# Patient Record
Sex: FEMALE | Race: WHITE | NOT HISPANIC OR LATINO | Employment: OTHER | URBAN - METROPOLITAN AREA
[De-identification: names, ages, dates, MRNs, and addresses within clinical notes are randomized per-mention and may not be internally consistent; named-entity substitution may affect disease eponyms.]

---

## 2017-01-12 ENCOUNTER — TELEPHONE (OUTPATIENT)
Dept: SURGERY | Facility: CLINIC | Age: 72
End: 2017-01-12

## 2017-01-12 ENCOUNTER — TELEPHONE (OUTPATIENT)
Dept: INTERNAL MEDICINE | Facility: CLINIC | Age: 72
End: 2017-01-12

## 2017-01-12 ENCOUNTER — OFFICE VISIT (OUTPATIENT)
Dept: SURGERY | Facility: CLINIC | Age: 72
End: 2017-01-12
Payer: MEDICARE

## 2017-01-12 VITALS
SYSTOLIC BLOOD PRESSURE: 169 MMHG | DIASTOLIC BLOOD PRESSURE: 72 MMHG | TEMPERATURE: 98 F | HEART RATE: 81 BPM | WEIGHT: 163.63 LBS | HEIGHT: 62 IN | BODY MASS INDEX: 30.11 KG/M2

## 2017-01-12 DIAGNOSIS — D05.12 DCIS (DUCTAL CARCINOMA IN SITU), LEFT: Primary | ICD-10-CM

## 2017-01-12 PROCEDURE — 99024 POSTOP FOLLOW-UP VISIT: CPT | Mod: S$GLB,,, | Performed by: SURGERY

## 2017-01-12 PROCEDURE — 99999 PR PBB SHADOW E&M-EST. PATIENT-LVL III: CPT | Mod: PBBFAC,,, | Performed by: SURGERY

## 2017-01-12 NOTE — TELEPHONE ENCOUNTER
Pt called regarding swelling and tenderness to breast a surgical site, pt c/o fullness/heaviness to breast, denies fever or redness to breast, pt requesting appointment with MD today, appointment scheduled per pt request

## 2017-01-12 NOTE — TELEPHONE ENCOUNTER
Spoke with pt, informed her that Dr Merchant was out of the office and the requested labs can be ordered during her upcoming office visit.

## 2017-01-12 NOTE — PROGRESS NOTES
REFERRING PHYSICIAN:  No referring provider defined for this encounter.       Manuela Merchant MD      DIAGNOSIS:    This is a 71 y.o. 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     TREATMENT SUMMARY:  The patient is status post left partial mastectomy re-excision on 12/02/2016. Her initial left partial mastectomy on 10/21/2016.     Final pathology was negative.    INTERVAL HISTORY:   Winnie Ngo comes in for a post-op check.  She denies fever, chills, chest pain or shortness of breath.  Her pain is well controlled.  Complains of swelling in the left breast.  No pain, just heaviness.    MEDICATIONS:  Current Outpatient Prescriptions   Medication Sig Dispense Refill    albuterol 90 mcg/actuation inhaler Inhale 2 puffs into the lungs every 6 (six) hours as needed for Wheezing. May substitute any albuterol inhaler on her plan. 1 Inhaler 12    cholecalciferol, vitamin D3, (VITAMIN D3) 1,000 unit capsule Take 1,000 Units by mouth 2 (two) times daily.      fluticasone-salmeterol 250-50 mcg/dose (ADVAIR) 250-50 mcg/dose diskus inhaler Inhale 1 puff into the lungs 2 (two) times daily. 1 each 12    lorazepam (ATIVAN) 1 MG tablet TAKE 1 TABLET BY MOUTH EVERY EVENING AS NEEDED FOR PANIC ATTACKS 30 tablet 0    ondansetron (ZOFRAN) 4 MG tablet Take 1 tablet (4 mg total) by mouth every 8 (eight) hours as needed for Nausea. 40 tablet 0    pantoprazole (PROTONIX) 20 MG tablet TAKE 1 TABLET BY MOUTH ONCE DAILY 30 tablet 5    hydrocodone-acetaminophen 5-325mg (NORCO) 5-325 mg per tablet Take 1-2 tablets by mouth every 4 to 6 hours as needed. 45 tablet 0     No current facility-administered medications for this visit.        ALLERGIES:   Review of patient's allergies indicates:   Allergen Reactions    Adhesive      Other reaction(s): Rash    Alendronate      Other reaction(s): Stomach upset  Other reaction(s): Stomach upset    Aspirin      Other reaction(s): Wheezing    Azithromycin Other  (See Comments)    Macrodantin [nitrofurantoin macrocrystalline]     Montelukast      Other reaction(s): Diarrhea  Other reaction(s): Diarrhea    Moxifloxacin      Other reaction(s): Diarrhea  Other reaction(s): Diarrhea    Nitrofurantoin monohyd/m-cryst      Other reaction(s): Nausea  Other reaction(s): Nausea    Penicillins      Other reaction(s): Rash    Sulfa (sulfonamide antibiotics)      Other reaction(s): Swelling  Other reaction(s): Hives    Sulfur      Other reaction(s): Hives  Other reaction(s): Swelling       PHYSICAL EXAMINATION:   General:  This is a well appearing female with appropriate speech, affect and gait.     Breast:  Incision clean, dry, and intact;non-tender, no increased warmth to touch.  There is a large seroma in the breast superior/lateral.    PATHOLOGY:  12/2/2016: #1 LEFT BREAST, LEFT ANTERIOR SUPERIOR MARGIN: NO RESIDUAL DUCTAL CARCINOMA IN SITU IDENTIFIED., #2 LEFT BREAST, ADDITIONAL ANTERIOR MARGIN: NO RESIDUAL DUCTAL CARCINOMA IN SITU IDENTIFIED., #3 LEFT BREAST, ADDITIONAL SUPERIOR MARGIN: NO RESIDUAL DUCTAL CARCINOMA IN SITU IDENTIFIED.      10/21/2016:  LEFT BREAST LEFT BREAST (WIRE LOCALIZATION LUMPECTOMY):  -DUCTAL CARCINOMA IN SITU, see synoptic report below  DUCTAL CARCINOMA IN SITU SYNOPTIC REPORT  -Procedure: Excision with wire-guided localization  -No lymph node sampling  -Specimen laterality: Left  -Size (extent) of DCIS: 14 mm in this specimen  -Histologic type: Ductal carcinoma in situ  -Architectural patterns: Solid  -Nuclear grade: High nuclear grade (grade 3 out of 3)  -Necrosis: Present, central expansive comedonecrosis  -Associated with microcalcifications  -Biopsy clip is present  -Margins:  -SUPERIOR margin is POSITIVE for ductal carcinoma in situ  -Other distance to closest anterior margin is less than 1 mm  -Other margins are negative for DCIS  -Pathologic staging: pTis NX MX  -Hormone receptors (performed on core biopsy):    -ER: Positive   -ID:  Positive      IMPRESSION:   The patient has had an uneventful postoperative course.    PLAN:   1. Return in 6 months for a follow up office visit and breast exam.  2. The patient is advised in continued exam of the breast chest wall and to report to this office sooner should she note any areas of abnormality or concern.  3. Will aspirate with ultrasound today.    Fine Needle Aspiration Procedure Note    Pre-operative Diagnosis: left breast seroma    Post-operative Diagnosis: same    Location: left breast    Anesthesia: not needed    Procedure Details   The Procedure, risks and complications have been discussed in detail (including, but not limited to pain, infection, bleeding) with the patient, and the patient has signed consent to have the surgery completed.    The skin was sterilely prepped and draped over the affected area in the usual fashion.  Fine Needle Aspiration was performed with a 21 guage needle x 1 using ultrasound guidance.  Contents of Aspiration: 155 ml of serosanguinous fluid.  Size of mass after cyst aspiration: gone  EBL: none  Sterile dressing placed.  May place ice pack over affected area during the first 24 hours.    Condition:  Stable    Complications:  none.

## 2017-01-12 NOTE — TELEPHONE ENCOUNTER
----- Message from Arabella Diehl sent at 1/12/2017 10:55 AM CST -----  Contact: call -[phone]  Doctor appointment and lab have been scheduled.  Please link lab orders to the lab appointment.  Date of doctor appointment:    Physical or EP:    Date of lab appointment:  01/13/17  Comments: pt is having labs for annual done and she would like to have a liver and pancreatis function added to that lab work

## 2017-01-13 ENCOUNTER — LAB VISIT (OUTPATIENT)
Dept: LAB | Facility: HOSPITAL | Age: 72
End: 2017-01-13
Attending: INTERNAL MEDICINE
Payer: MEDICARE

## 2017-01-13 DIAGNOSIS — R73.09 ELEVATED GLUCOSE: ICD-10-CM

## 2017-01-13 DIAGNOSIS — E78.00 ELEVATED LDL CHOLESTEROL LEVEL: ICD-10-CM

## 2017-01-13 LAB
ALBUMIN SERPL BCP-MCNC: 3.7 G/DL
ALP SERPL-CCNC: 83 U/L
ALT SERPL W/O P-5'-P-CCNC: 19 U/L
ANION GAP SERPL CALC-SCNC: 10 MMOL/L
AST SERPL-CCNC: 20 U/L
BASOPHILS # BLD AUTO: 0.03 K/UL
BASOPHILS NFR BLD: 0.5 %
BILIRUB SERPL-MCNC: 0.7 MG/DL
BUN SERPL-MCNC: 10 MG/DL
CALCIUM SERPL-MCNC: 9.7 MG/DL
CHLORIDE SERPL-SCNC: 104 MMOL/L
CHOLEST/HDLC SERPL: 4.8 {RATIO}
CO2 SERPL-SCNC: 26 MMOL/L
CREAT SERPL-MCNC: 0.8 MG/DL
DIFFERENTIAL METHOD: ABNORMAL
EOSINOPHIL # BLD AUTO: 0.2 K/UL
EOSINOPHIL NFR BLD: 4.1 %
ERYTHROCYTE [DISTWIDTH] IN BLOOD BY AUTOMATED COUNT: 12.8 %
EST. GFR  (AFRICAN AMERICAN): >60 ML/MIN/1.73 M^2
EST. GFR  (NON AFRICAN AMERICAN): >60 ML/MIN/1.73 M^2
ESTIMATED AVG GLUCOSE: 128 MG/DL
GLUCOSE SERPL-MCNC: 132 MG/DL
HBA1C MFR BLD HPLC: 6.1 %
HCT VFR BLD AUTO: 40.1 %
HDL/CHOLESTEROL RATIO: 20.8 %
HDLC SERPL-MCNC: 264 MG/DL
HDLC SERPL-MCNC: 55 MG/DL
HGB BLD-MCNC: 13.5 G/DL
LDLC SERPL CALC-MCNC: 178.2 MG/DL
LYMPHOCYTES # BLD AUTO: 2 K/UL
LYMPHOCYTES NFR BLD: 33.8 %
MCH RBC QN AUTO: 32 PG
MCHC RBC AUTO-ENTMCNC: 33.7 %
MCV RBC AUTO: 95 FL
MONOCYTES # BLD AUTO: 0.5 K/UL
MONOCYTES NFR BLD: 8.2 %
NEUTROPHILS # BLD AUTO: 3.1 K/UL
NEUTROPHILS NFR BLD: 53.2 %
NONHDLC SERPL-MCNC: 209 MG/DL
PLATELET # BLD AUTO: 247 K/UL
PMV BLD AUTO: 11 FL
POTASSIUM SERPL-SCNC: 4 MMOL/L
PROT SERPL-MCNC: 7.2 G/DL
RBC # BLD AUTO: 4.22 M/UL
SODIUM SERPL-SCNC: 140 MMOL/L
TRIGL SERPL-MCNC: 154 MG/DL
WBC # BLD AUTO: 5.85 K/UL

## 2017-01-13 PROCEDURE — 85025 COMPLETE CBC W/AUTO DIFF WBC: CPT

## 2017-01-13 PROCEDURE — 80053 COMPREHEN METABOLIC PANEL: CPT

## 2017-01-13 PROCEDURE — 80061 LIPID PANEL: CPT

## 2017-01-13 PROCEDURE — 36415 COLL VENOUS BLD VENIPUNCTURE: CPT | Mod: PO

## 2017-01-13 PROCEDURE — 83036 HEMOGLOBIN GLYCOSYLATED A1C: CPT

## 2017-01-16 ENCOUNTER — TELEPHONE (OUTPATIENT)
Dept: ADMINISTRATIVE | Facility: OTHER | Age: 72
End: 2017-01-16

## 2017-01-17 ENCOUNTER — INITIAL CONSULT (OUTPATIENT)
Dept: RADIATION ONCOLOGY | Facility: CLINIC | Age: 72
End: 2017-01-17
Payer: MEDICARE

## 2017-01-17 VITALS
DIASTOLIC BLOOD PRESSURE: 67 MMHG | HEART RATE: 75 BPM | SYSTOLIC BLOOD PRESSURE: 150 MMHG | TEMPERATURE: 98 F | HEIGHT: 62 IN | WEIGHT: 164.81 LBS | RESPIRATION RATE: 20 BRPM | BODY MASS INDEX: 30.33 KG/M2

## 2017-01-17 DIAGNOSIS — D05.12 DCIS (DUCTAL CARCINOMA IN SITU) OF BREAST, LEFT: Primary | ICD-10-CM

## 2017-01-17 PROCEDURE — 1159F MED LIST DOCD IN RCRD: CPT | Mod: S$GLB,,, | Performed by: RADIOLOGY

## 2017-01-17 PROCEDURE — 99999 PR PBB SHADOW E&M-EST. PATIENT-LVL III: CPT | Mod: PBBFAC,,, | Performed by: RADIOLOGY

## 2017-01-17 PROCEDURE — 1157F ADVNC CARE PLAN IN RCRD: CPT | Mod: S$GLB,,, | Performed by: RADIOLOGY

## 2017-01-17 PROCEDURE — 1160F RVW MEDS BY RX/DR IN RCRD: CPT | Mod: S$GLB,,, | Performed by: RADIOLOGY

## 2017-01-17 PROCEDURE — 99203 OFFICE O/P NEW LOW 30 MIN: CPT | Mod: S$GLB,,, | Performed by: RADIOLOGY

## 2017-01-17 PROCEDURE — 1126F AMNT PAIN NOTED NONE PRSNT: CPT | Mod: S$GLB,,, | Performed by: RADIOLOGY

## 2017-01-17 NOTE — PROGRESS NOTES
HISTORY OF PRESENT ILLNESS:   This patient presents for discussion of possible adjuvant radiotherapy for a recently diagnosed DCIS of the Lt. breast.     Mrs. Ngo' history dates back to 2016 when diagnostic MMG confirmed indeterminate calcifications with segmental distribution in the posterior central region of the Lt. breast.  Core biopsies revealed high grade DCIS with associated microcalcificatiions.  100% of the tumor cells were strongly ER and NM positive.  The patient underwent wire localization lumpectomy on 10/21/16.  Pathology revealed a 1.4 cm focus of solid high grade DCIS.  There was central expansive comedonecrosis.  The superior margin was positive and the anterior margin was close at < 1 mm.  She returned for re-excision of the cavity on 16.  Pathology revealed no residual DCIS.  She is now referred for consideration of radiotherapy.  Today, the patient states she feels well.  Mild breast soreness.       REVIEW OF SYSTEMS:   Review of Systems   Constitutional: Negative for chills, diaphoresis and fever.   HENT: Negative for congestion and sore throat.    Respiratory: Negative for cough and sputum production.    Cardiovascular: Negative for chest pain, palpitations and orthopnea.   Gastrointestinal: Negative for abdominal pain, nausea and vomiting.   Neurological: Negative for weakness.       GYN HISTORY:  Age of menarche was 12. Age of menopause (surgical) was 31. Last menstrual period was age 31. Patient admits to hormonal therapy (4 years of estrogen only). Patient is . Patient did not breast feed.    PAST MEDICAL HISTORY:  Past Medical History   Diagnosis Date    Allergy     Anxiety     Asthma     Benign neoplasm of colon     Cancer      colon    Cataract     COPD (chronic obstructive pulmonary disease)     Fever blister     GERD (gastroesophageal reflux disease)     Hyperlipidemia     Joint pain     Osteopenia     Personal history of malignant neoplasm of  large intestine        PAST SURGICAL HISTORY:  Past Surgical History   Procedure Laterality Date    Colon surgery       stage T1 N0 M0    Hysterectomy      Total abdominal hysterectomy w/ bilateral salpingoophorectomy      Upper endoscopy w/ banding  2004     Schatzki's ring dialated 2 sessile polyps in stomach    Appendectomy      Cholecystectomy      Sinus surgery      Breast surgery       lump removal    Knee surgery       arthroscopic    Nasal septum surgery       with removal of polyps    Sigmoid colectomy         ALLERGIES:   Review of patient's allergies indicates:   Allergen Reactions    Adhesive      Other reaction(s): Rash    Alendronate      Other reaction(s): Stomach upset  Other reaction(s): Stomach upset    Aspirin      Other reaction(s): Wheezing    Azithromycin Other (See Comments)    Macrodantin [nitrofurantoin macrocrystalline]     Montelukast      Other reaction(s): Diarrhea  Other reaction(s): Diarrhea    Moxifloxacin      Other reaction(s): Diarrhea  Other reaction(s): Diarrhea    Nitrofurantoin monohyd/m-cryst      Other reaction(s): Nausea  Other reaction(s): Nausea    Penicillins      Other reaction(s): Rash    Sulfa (sulfonamide antibiotics)      Other reaction(s): Swelling  Other reaction(s): Hives    Sulfur      Other reaction(s): Hives  Other reaction(s): Swelling       MEDICATIONS:  Current Outpatient Prescriptions   Medication Sig    albuterol 90 mcg/actuation inhaler Inhale 2 puffs into the lungs every 6 (six) hours as needed for Wheezing. May substitute any albuterol inhaler on her plan.    cholecalciferol, vitamin D3, (VITAMIN D3) 1,000 unit capsule Take 1,000 Units by mouth 2 (two) times daily.    fluticasone-salmeterol 250-50 mcg/dose (ADVAIR) 250-50 mcg/dose diskus inhaler Inhale 1 puff into the lungs 2 (two) times daily.    lorazepam (ATIVAN) 1 MG tablet TAKE 1 TABLET BY MOUTH EVERY EVENING AS NEEDED FOR PANIC ATTACKS    pantoprazole (PROTONIX) 20 MG  tablet TAKE 1 TABLET BY MOUTH ONCE DAILY    hydrocodone-acetaminophen 5-325mg (NORCO) 5-325 mg per tablet Take 1-2 tablets by mouth every 4 to 6 hours as needed.    ondansetron (ZOFRAN) 4 MG tablet Take 1 tablet (4 mg total) by mouth every 8 (eight) hours as needed for Nausea.     No current facility-administered medications for this visit.        SOCIAL HISTORY:  Social History     Social History    Marital status:      Spouse name: N/A    Number of children: N/A    Years of education: N/A     Occupational History    Not on file.     Social History Main Topics    Smoking status: Never Smoker    Smokeless tobacco: Never Used    Alcohol use No    Drug use: No    Sexual activity: No     Other Topics Concern    Are You Pregnant Or Think You May Be? No    Breast-Feeding No     Social History Narrative       FAMILY HISTORY:  Family History   Problem Relation Age of Onset    Alzheimer's disease Mother     Kidney disease Mother      kidney stone    Heart disease Father 41     MI    Stroke Father     Cancer Brother      leg    Cancer Brother      Kidney cancer    Melanoma Neg Hx          PHYSICAL EXAMINATION:  Vitals:    17 0901   BP: (!) 150/67   Pulse: 75   Resp: 20   Temp: 97.9 °F (36.6 °C)     Physical Exam   Constitutional: She is well-developed, well-nourished, and in no distress.   Neck: Normal range of motion. Neck supple.   Pulmonary/Chest: Left breast exhibits inverted nipple. Left breast exhibits no mass, no nipple discharge, no skin change and no tenderness. Breasts are symmetrical.       Lymphadenopathy:     She has no cervical adenopathy.     She has no axillary adenopathy.        Right: No supraclavicular adenopathy present.        Left: No supraclavicular adenopathy present.       ASSESSMENT/PLAN:  Stage 0 (Tis, N0, M0) DCIS of the Lt. breast.     ECO    We discussed the rational for consideration of adjuvant radiotherapy in this setting.  Explained that radiotherapy  would be used to help decrease her risk of a local recurrence.  The patient understands her overall prognosis is very good given this is only DCIS and not invasive cancer.  We discussed the procedures, risks and benefits of radiotherapy.  Explained the acute and long term side effects of therapy including pulmonary and cardiac effects.   If she proceeds with therapy, we would recommend 42.5 Gy in 16 fractions.  Explained that based on the [place] criteria she would score a 1 for size, 1 - 2 for margins, 3 for grade and 1 for age.  Overall she is borderline for treatment at a score of 6 - 7.  The patient would like to consider her options further.  Explained that either radiation or observation is reasonable.  Also if she decides to take hormonal therapy then observation would be quite reasonable.   She was given my card with instructions to contact our office if she wants to proceed with treatment.     I spent approximately 60 minutes reviewing the available records and evaluating the patient, out of which over 50% of the time was spent face to face with the patient in counseling and coordinating this patient's care.

## 2017-01-19 ENCOUNTER — OFFICE VISIT (OUTPATIENT)
Dept: INTERNAL MEDICINE | Facility: CLINIC | Age: 72
End: 2017-01-19
Payer: MEDICARE

## 2017-01-19 VITALS
HEART RATE: 76 BPM | WEIGHT: 164.25 LBS | DIASTOLIC BLOOD PRESSURE: 72 MMHG | HEIGHT: 62 IN | BODY MASS INDEX: 30.22 KG/M2 | SYSTOLIC BLOOD PRESSURE: 138 MMHG

## 2017-01-19 DIAGNOSIS — Z85.038 HISTORY OF COLON CANCER: ICD-10-CM

## 2017-01-19 DIAGNOSIS — D05.12 DCIS (DUCTAL CARCINOMA IN SITU), LEFT: ICD-10-CM

## 2017-01-19 DIAGNOSIS — E11.9 CONTROLLED TYPE 2 DIABETES MELLITUS WITHOUT COMPLICATION, WITHOUT LONG-TERM CURRENT USE OF INSULIN: Primary | ICD-10-CM

## 2017-01-19 DIAGNOSIS — E78.00 PURE HYPERCHOLESTEROLEMIA: ICD-10-CM

## 2017-01-19 PROCEDURE — 1157F ADVNC CARE PLAN IN RCRD: CPT | Mod: S$GLB,,, | Performed by: INTERNAL MEDICINE

## 2017-01-19 PROCEDURE — 99499 UNLISTED E&M SERVICE: CPT | Mod: S$GLB,,, | Performed by: INTERNAL MEDICINE

## 2017-01-19 PROCEDURE — 1126F AMNT PAIN NOTED NONE PRSNT: CPT | Mod: S$GLB,,, | Performed by: INTERNAL MEDICINE

## 2017-01-19 PROCEDURE — 1159F MED LIST DOCD IN RCRD: CPT | Mod: S$GLB,,, | Performed by: INTERNAL MEDICINE

## 2017-01-19 PROCEDURE — 99214 OFFICE O/P EST MOD 30 MIN: CPT | Mod: S$GLB,,, | Performed by: INTERNAL MEDICINE

## 2017-01-19 PROCEDURE — 1160F RVW MEDS BY RX/DR IN RCRD: CPT | Mod: S$GLB,,, | Performed by: INTERNAL MEDICINE

## 2017-01-19 PROCEDURE — 3044F HG A1C LEVEL LT 7.0%: CPT | Mod: S$GLB,,, | Performed by: INTERNAL MEDICINE

## 2017-01-19 PROCEDURE — 2022F DILAT RTA XM EVC RTNOPTHY: CPT | Mod: S$GLB,,, | Performed by: INTERNAL MEDICINE

## 2017-01-19 PROCEDURE — 99999 PR PBB SHADOW E&M-EST. PATIENT-LVL III: CPT | Mod: PBBFAC,,, | Performed by: INTERNAL MEDICINE

## 2017-01-19 PROCEDURE — 3060F POS MICROALBUMINURIA REV: CPT | Mod: S$GLB,,, | Performed by: INTERNAL MEDICINE

## 2017-01-19 RX ORDER — ATORVASTATIN CALCIUM 10 MG/1
10 TABLET, FILM COATED ORAL NIGHTLY
Qty: 90 TABLET | Refills: 3 | Status: SHIPPED | OUTPATIENT
Start: 2017-01-19 | End: 2018-02-19 | Stop reason: SDUPTHER

## 2017-01-19 NOTE — MR AVS SNAPSHOT
[facility]  [address]  [facility] [address]  Phone: [phone]  Fax: [phone]                  Winnie Ngo   2017 11:30 AM   Office Visit    Description:  Female : 1945   Provider:  Manuela Merchant MD   Department:  [facility]           Reason for Visit     Follow-up           Diagnoses this Visit        Comments    Controlled type 2 diabetes mellitus without complication, without long-term current use of insulin    -  Primary     Pure hypercholesterolemia         DCIS (ductal carcinoma in situ), left                To Do List           Future Appointments        Provider Department Dept Phone    2/15/2017 10:00 AM MD [facility] - Hematology Oncology [phone]    2017 10:00 AM [facility] [phone]      Goals (5 Years of Data)     None      Follow-Up and Disposition     Return in about 2 months (around 3/19/2017) for Follow up : morning appt.       These Medications        Disp Refills Start End    atorvastatin (LIPITOR) 10 MG tablet 90 tablet 3 2017    Take 1 tablet (10 mg total) by mouth every evening. - Oral    Pharmacy: [facility] - [address] Ph #: [phone]         [facility]     [facility] On Call Nurse Care Line -  Assistance  Registered nurses in the [facility] On Call Center provide clinical advisement, health education, appointment booking, and other advisory services.  Call for this free service at [phone].             Medications           Message regarding Medications     Verify the changes and/or additions to your medication regime listed below are the same as discussed with your clinician today.  If any of these changes or additions are incorrect, please notify your healthcare provider.        START taking these NEW medications        Refills    atorvastatin (LIPITOR) 10 MG tablet 3     "Sig: Take 1 tablet (10 mg total) by mouth every evening.    Class: Normal    Route: Oral           Verify that the below list of medications is an accurate representation of the medications you are currently taking.  If none reported, the list may be blank. If incorrect, please contact your healthcare provider. Carry this list with you in case of emergency.           Current Medications     albuterol 90 mcg/actuation inhaler Inhale 2 puffs into the lungs every 6 (six) hours as needed for Wheezing. May substitute any albuterol inhaler on her plan.    cholecalciferol, vitamin D3, (VITAMIN D3) 1,000 unit capsule Take 1,000 Units by mouth 2 (two) times daily.    fluticasone-salmeterol 250-50 mcg/dose (ADVAIR) 250-50 mcg/dose diskus inhaler Inhale 1 puff into the lungs 2 (two) times daily.    hydrocodone-acetaminophen 5-325mg (NORCO) 5-325 mg per tablet Take 1-2 tablets by mouth every 4 to 6 hours as needed.    lorazepam (ATIVAN) 1 MG tablet TAKE 1 TABLET BY MOUTH EVERY EVENING AS NEEDED FOR PANIC ATTACKS    ondansetron (ZOFRAN) 4 MG tablet Take 1 tablet (4 mg total) by mouth every 8 (eight) hours as needed for Nausea.    pantoprazole (PROTONIX) 20 MG tablet TAKE 1 TABLET BY MOUTH ONCE DAILY    atorvastatin (LIPITOR) 10 MG tablet Take 1 tablet (10 mg total) by mouth every evening.           Clinical Reference Information           Vital Signs - Last Recorded  Most recent update: 1/19/2017 11:17 AM by Makenna Castellon MA    BP Pulse Ht Wt BMI    138/72 76 5' 2" (1.575 m) 74.5 kg (164 lb 3.9 oz) 30.04 kg/m2      Blood Pressure          Most Recent Value    BP  138/72      Allergies as of 1/19/2017     Adhesive    Alendronate    Aspirin    Azithromycin    Macrodantin [Nitrofurantoin Macrocrystalline]    Montelukast    Moxifloxacin    Nitrofurantoin Monohyd/m-cryst    Penicillins    Sulfa (Sulfonamide Antibiotics)    Sulfur      Immunizations Administered on Date of Encounter - 1/19/2017     None      Orders Placed During " Today's Visit      Normal Orders This Visit    Ambulatory Referral to Diabetes Education

## 2017-01-19 NOTE — PROGRESS NOTES
Subjective:       Patient ID: Winnie Ngo is a 71 y.o. female.    Chief Complaint: Follow-up (Cholesterol)    HPI   Patient has recently been diagnosed with DCIS having surgery. She discussed with me her concerns with deciding on further treatment and will be meeting with Dr. Flores.    She has glucose and A1C consistent with diabetes. We discussed the importance of treating diabetes beginning with diabetes education, hypertension goals and hyperlipidemia treatment.    Review of Systems   Constitutional: Negative for chills, fever and unexpected weight change.   HENT: Negative for trouble swallowing.    Respiratory: Negative for cough, shortness of breath and wheezing.    Cardiovascular: Negative for chest pain and palpitations.   Gastrointestinal: Negative for abdominal distention, abdominal pain, blood in stool and vomiting.   Musculoskeletal: Negative for back pain.       Objective:      Physical Exam   Constitutional: She is oriented to person, place, and time. She appears well-developed and well-nourished. No distress.   Neck: Carotid bruit is not present. No thyromegaly present.   Cardiovascular: Normal rate, regular rhythm and normal heart sounds.  PMI is not displaced.    Pulmonary/Chest: Effort normal and breath sounds normal. No respiratory distress.   Abdominal: Soft. Bowel sounds are normal. She exhibits no distension. There is no tenderness.   Musculoskeletal: She exhibits no edema.   Neurological: She is alert and oriented to person, place, and time.       Assessment:       1. Controlled type 2 diabetes mellitus without complication, without long-term current use of insulin    2. Pure hypercholesterolemia        Plan:       Winnie was seen today for follow-up.    Diagnoses and all orders for this visit:    Controlled type 2 diabetes mellitus without complication, without long-term current use of insulin: will begin with diabetes education  -     Ambulatory Referral to Diabetes Education    Pure  hypercholesterolemia: will start statin    Blood pressure at goal    DCIS (ductal carcinoma in situ), left    Other orders  -     atorvastatin (LIPITOR) 10 MG tablet; Take 1 tablet (10 mg total) by mouth every evening.      Return in about 2 months (around 3/19/2017) for Follow up : morning appt.    New Prescriptions    ATORVASTATIN (LIPITOR) 10 MG TABLET    Take 1 tablet (10 mg total) by mouth every evening.       Modified Medications    No medications on file       Orders Placed This Encounter   Procedures    Ambulatory Referral to Diabetes Education     Referral Priority:   Routine     Referral Type:   Consultation     Referral Reason:   Specialty Services Required     Requested Specialty:   Endocrinology     Number of Visits Requested:   1       Labs, studies and consults associated with this visit were reviewed

## 2017-02-15 ENCOUNTER — INITIAL CONSULT (OUTPATIENT)
Dept: HEMATOLOGY/ONCOLOGY | Facility: CLINIC | Age: 72
End: 2017-02-15
Payer: MEDICARE

## 2017-02-15 VITALS
DIASTOLIC BLOOD PRESSURE: 70 MMHG | RESPIRATION RATE: 20 BRPM | BODY MASS INDEX: 30.34 KG/M2 | WEIGHT: 164.88 LBS | HEIGHT: 62 IN | OXYGEN SATURATION: 97 % | HEART RATE: 69 BPM | TEMPERATURE: 98 F | SYSTOLIC BLOOD PRESSURE: 150 MMHG

## 2017-02-15 DIAGNOSIS — M85.9 DISORDER OF BONE DENSITY AND STRUCTURE, UNSPECIFIED: ICD-10-CM

## 2017-02-15 DIAGNOSIS — M85.80 OSTEOPENIA: ICD-10-CM

## 2017-02-15 DIAGNOSIS — D05.12 DCIS (DUCTAL CARCINOMA IN SITU), LEFT: Primary | ICD-10-CM

## 2017-02-15 PROCEDURE — 1159F MED LIST DOCD IN RCRD: CPT | Mod: S$GLB,,, | Performed by: INTERNAL MEDICINE

## 2017-02-15 PROCEDURE — 99205 OFFICE O/P NEW HI 60 MIN: CPT | Mod: S$GLB,,, | Performed by: INTERNAL MEDICINE

## 2017-02-15 PROCEDURE — 1157F ADVNC CARE PLAN IN RCRD: CPT | Mod: S$GLB,,, | Performed by: INTERNAL MEDICINE

## 2017-02-15 PROCEDURE — 1160F RVW MEDS BY RX/DR IN RCRD: CPT | Mod: S$GLB,,, | Performed by: INTERNAL MEDICINE

## 2017-02-15 PROCEDURE — 1126F AMNT PAIN NOTED NONE PRSNT: CPT | Mod: S$GLB,,, | Performed by: INTERNAL MEDICINE

## 2017-02-15 PROCEDURE — 99999 PR PBB SHADOW E&M-EST. PATIENT-LVL IV: CPT | Mod: PBBFAC,,, | Performed by: INTERNAL MEDICINE

## 2017-02-15 NOTE — PROGRESS NOTES
Subjective:       Patient ID: Winnie Ngo is a 71 y.o. female.    Chief Complaint: Consult    HPI     This is a 70 yo female recently diagnosed with DCIS who presents to discuss prevention strategies and surveillance. She has already done some reading on endocrine therapy and feels like it is not something she wishes to pursue but open to discussion. Her concerns are side effects- for example she has osteopenia already and is trying to lose weight and worries how this would be affected.    - 9/9/16 Screening mammography:  Calcifications in the left breast noted  - 9/14/16 Diagnostic mammogram and Ultrasound:  Calcifications in the left breast are suspicious.  Histology using core biopsy  is recommended.  ACR BI-RADS Category 4: Suspicious Abnormality  - 9/21/16 stereotactic biopsy performed  Pathology revealed:  1. CORE BIOPSIES OF POSTERIOR CENTRAL LEFT BREAST:  HIGH-GRADE DUCTAL CARCINOMA IN SITU WITH ASSOCIATED MICROCALCIFICATIONS  HORMONE RECEPTOR STUDIES:  Essentially all of the cells of the carcinoma in situ are strongly both nuclear estrogen receptor and nuclear  progesterone receptor positive. The positive and negative controls stained appropriately.  2. CORE BIOPSIES OF POSTERIOR CENTRAL LEFT BREAST:  NO NEOPLASIA IDENTIFIED  - 10/21/16 Lumpectomy with Dr. Mendoza  -Specimen laterality: Left  -Size (extent) of DCIS: 14 mm in this specimen  -Histologic type: Ductal carcinoma in situ  -Architectural patterns: Solid  -Nuclear grade: High nuclear grade (grade 3 out of 3)  -Necrosis: Present, central expansive comedonecrosis  -Associated with microcalcifications  -Biopsy clip is present  -Margins:  -SUPERIOR margin is POSITIVE for ductal carcinoma in situ  -Other distance to closest anterior margin is less than 1 mm  -Other margins are negative for DCIS  -Pathologic staging: pTis NX MX  - required re-excision for close margins  Pathology negative for residual         GYN History:  Age of menarche was 12. Age  of menopause (surgical) was 31. Last menstrual period was age 31. Patient admits to hormonal therapy (4 years of estrogen only). Patient is . Patient did not breast feed.     FAMILY history:  No family hx of ovarian or breast cancer    PMH:  Osteopenia  Hyperlipidemia  GERD  Asthma  Borderline COPD  Arthritis- knee surgery  Chronic back issues  Hx of previous right lumpectomy () with benign findings and previous left core biopsy that was also benign.     Review of Systems   Constitutional: Negative for activity change, appetite change, chills, diaphoresis, fatigue, fever and unexpected weight change.   HENT: Negative for congestion, dental problem, ear pain, hearing loss, mouth sores, nosebleeds, rhinorrhea, tinnitus and trouble swallowing.    Eyes: Negative for redness and visual disturbance.   Respiratory: Negative for cough, chest tightness, shortness of breath and wheezing.    Cardiovascular: Negative for chest pain, palpitations and leg swelling.   Gastrointestinal: Negative for abdominal distention, abdominal pain, blood in stool, constipation, diarrhea, nausea and vomiting.   Genitourinary: Negative for decreased urine volume, difficulty urinating, dysuria, frequency and hematuria.   Musculoskeletal: Positive for arthralgias. Negative for back pain, gait problem, joint swelling and neck pain.   Skin: Negative for color change, pallor, rash and wound.   Neurological: Negative for dizziness, weakness, light-headedness, numbness and headaches.   Hematological: Negative for adenopathy. Does not bruise/bleed easily.   Psychiatric/Behavioral: Negative for confusion, dysphoric mood and sleep disturbance.       Objective:      Physical Exam   Constitutional: She is oriented to person, place, and time. She appears well-developed and well-nourished. No distress.   Presents alone   HENT:   Head: Normocephalic and atraumatic.   Right Ear: External ear normal.   Left Ear: External ear normal.   Nose: Nose normal.    Mouth/Throat: Oropharynx is clear and moist. No oropharyngeal exudate.   Eyes: Conjunctivae and EOM are normal. Pupils are equal, round, and reactive to light. Right eye exhibits no discharge. Left eye exhibits no discharge. No scleral icterus. Right pupil is round and reactive. Left pupil is round and reactive.   Neck: Normal range of motion. Neck supple. No tracheal deviation present. No thyromegaly present.   Cardiovascular: Normal rate, regular rhythm, normal heart sounds and intact distal pulses.  Exam reveals no gallop and no friction rub.    No murmur heard.  Pulmonary/Chest: Effort normal and breath sounds normal. No respiratory distress. She has no wheezes. She has no rales. She exhibits no tenderness.   Breasts - no masses, no lymphadenopathy   Abdominal: Soft. Bowel sounds are normal. She exhibits no distension and no mass. There is no hepatosplenomegaly. There is no tenderness. There is no rebound and no guarding.   No organomegaly   Musculoskeletal: Normal range of motion. She exhibits no edema or tenderness.   Lymphadenopathy:        Head (right side): No submandibular adenopathy present.        Head (left side): No submandibular adenopathy present.     She has no cervical adenopathy.        Right cervical: No superficial cervical, no deep cervical and no posterior cervical adenopathy present.       Left cervical: No superficial cervical, no deep cervical and no posterior cervical adenopathy present.        Right: No inguinal and no supraclavicular adenopathy present.        Left: No inguinal and no supraclavicular adenopathy present.   Neurological: She is alert and oriented to person, place, and time. She has normal strength and normal reflexes. No cranial nerve deficit or sensory deficit. She exhibits normal muscle tone. Coordination normal.   Skin: Skin is warm and dry. No bruising, no lesion, no petechiae and no rash noted. She is not diaphoretic. No erythema. No pallor.   Psychiatric: She has a  normal mood and affect. Her behavior is normal. Judgment and thought content normal. Her mood appears not anxious. She does not exhibit a depressed mood.   Nursing note and vitals reviewed.      Assessment:       1. DCIS (ductal carcinoma in situ), left        Plan:     1. Declines adjuvant endocrine therapy  Careful discussion regarding two classes- Tamoxifen and aromatase inhibitors and pros and cosn of each  She also declines adjuvant XRT  Will need annual mammograms  Requests nutritionist referral if insurance covers - otherwise literature to assist   Needs Vit D > 30 and discussed  Also eligible for [facility] referral      Distress Screening Results: Psychosocial Distress screening score of Distress Score: 5 noted and reviewed. No intervention indicated.

## 2017-02-15 NOTE — LETTER
February 15, 2017      Carmen Mendoza MD  [address] [facility] [address]           [facility]  [address]  [facility] [address]  Phone: [phone]          Patient: Winnie Ngo   MR Number: [id number]   YOB: 1945   Date of Visit: 2/15/2017       Dear Dr. Carmen Mendoza:    Thank you for referring Winnie Ngo to me for evaluation. Attached you will find relevant portions of my assessment and plan of care.    If you have questions, please do not hesitate to call me. I look forward to following Winnie Ngo along with you.    Sincerely,    Ileana Flores MD    Enclosure  CC:  No Recipients    If you would like to receive this communication electronically, please contact [email or web address] or [phone] to request more information on [facility] access.    For providers and/or their staff who would like to refer a patient to [facility], please contact us through our one-stop-shop provider referral line, [facility], at [phone].    If you feel you have received this communication in error or would no longer like to receive these types of communications, please e-mail [email or web address]

## 2017-02-17 ENCOUNTER — CLINICAL SUPPORT (OUTPATIENT)
Dept: DIABETES | Facility: CLINIC | Age: 72
End: 2017-02-17
Payer: MEDICARE

## 2017-02-17 DIAGNOSIS — E11.9 CONTROLLED TYPE 2 DIABETES MELLITUS WITHOUT COMPLICATION, WITHOUT LONG-TERM CURRENT USE OF INSULIN: ICD-10-CM

## 2017-02-17 DIAGNOSIS — E11.9 CONTROLLED TYPE 2 DIABETES MELLITUS WITHOUT COMPLICATION, WITHOUT LONG-TERM CURRENT USE OF INSULIN: Primary | ICD-10-CM

## 2017-02-17 PROCEDURE — G0108 DIAB MANAGE TRN  PER INDIV: HCPCS | Mod: S$GLB,,, | Performed by: INTERNAL MEDICINE

## 2017-02-17 PROCEDURE — 99999 PR PBB SHADOW E&M-EST. PATIENT-LVL I: CPT | Mod: PBBFAC,,,

## 2017-02-17 RX ORDER — LANCETS
1 EACH MISCELLANEOUS DAILY
Qty: 30 EACH | Refills: 11 | Status: SHIPPED | OUTPATIENT
Start: 2017-02-17 | End: 2023-01-27

## 2017-02-17 NOTE — PROGRESS NOTES
02/17/17 0000   Diabetes Education Visit   Diabetes Education Record Assessment/Progress Initial   Diabetes Type   Diabetes Type  Type II   Diabetes History   Diabetes Diagnosis 0-1 year   Nutrition   Meal Planning 3 meals per day;snacks between meal;water;eats out often  (HS snack; tea w/ sugar)   Monitoring    Monitoring (None)   Exercise    Exercise Type walking   Frequency 3-5 Times per week   Duration 30 min   Current Diabetes Treatment    Current Treatment (None)   Social History   Preferred Learning Method Face to Face;Demonstration;Hands On;Reading Materials   Primary Support Self   Smoking Status Never a Smoker   Alcohol Use Never   Barriers to Change   Barriers to Change None   Learning Challenges  None   Readiness to Learn    Readiness to Learn  Acceptance   Cultural Influences   Cultural Influences No   Diabetes Education Assessment/Progress   Acute Complications (preventing, detecting, and treating acute complications) 5;DC;IS;W   Chronic Complications (preventing, detecting, and treating chronic complications) 5;DC;IS;W   Diabetes Disease Process (diabetes disease process and treatment options) 5;DC;IS;W   Nutrition (Incorporating nutritional management into one's lifestyle) 5;DC;IS;W   Physical Activity (incorporating physical activity into one's lifestyle) 5;DC;IS;W   Medications (states correct name, dose, onset, peak, duration, side effects & timing of meds) N/A   Monitoring (monitoring blood glucose/other parameters & using results) 5;DC;IS;D;R;W   Goal Setting and Problem Solving (verbalizes behavior change strategies & sets realistic goals) 5;DC;IS   Behavior Change (developing personal strategies to health & behavior change) 5;DC;IS   Psychosocial Issues (developing personal srategies to address psychosocial concerns) 5;DC;IS   Goals   Monitoring Set  (Check BG 3-4 times a week)   Start Date 02/17/17   Diabetes Care Plan/Intervention   Education Plan/Intervention (Patient to call with  questions or concerns or if would like to schedule a follow up - did not wish to schedule a follow up at this time. Will see PCP and discuss in March)   Diabetes Meal Plan   Carbohydrate Per Meal 30-45g   Carbohydrate Per Snack  15-20g   Education Units of Time    Time Spent 45 min

## 2017-02-20 DIAGNOSIS — E11.9 TYPE 2 DIABETES MELLITUS WITHOUT COMPLICATION: ICD-10-CM

## 2017-03-16 DIAGNOSIS — F41.9 ANXIETY: ICD-10-CM

## 2017-03-16 RX ORDER — LORAZEPAM 1 MG/1
TABLET ORAL
Qty: 30 TABLET | Refills: 0 | Status: SHIPPED | OUTPATIENT
Start: 2017-03-16 | End: 2017-05-25 | Stop reason: SDUPTHER

## 2017-03-27 ENCOUNTER — OFFICE VISIT (OUTPATIENT)
Dept: INTERNAL MEDICINE | Facility: CLINIC | Age: 72
End: 2017-03-27
Payer: MEDICARE

## 2017-03-27 ENCOUNTER — LAB VISIT (OUTPATIENT)
Dept: LAB | Facility: HOSPITAL | Age: 72
End: 2017-03-27
Attending: INTERNAL MEDICINE
Payer: MEDICARE

## 2017-03-27 VITALS
HEIGHT: 62 IN | WEIGHT: 160.94 LBS | DIASTOLIC BLOOD PRESSURE: 70 MMHG | BODY MASS INDEX: 29.62 KG/M2 | SYSTOLIC BLOOD PRESSURE: 130 MMHG | HEART RATE: 70 BPM

## 2017-03-27 DIAGNOSIS — Z23 IMMUNIZATION DUE: ICD-10-CM

## 2017-03-27 DIAGNOSIS — E11.9 CONTROLLED TYPE 2 DIABETES MELLITUS WITHOUT COMPLICATION, WITHOUT LONG-TERM CURRENT USE OF INSULIN: Primary | ICD-10-CM

## 2017-03-27 DIAGNOSIS — E78.00 PURE HYPERCHOLESTEROLEMIA: ICD-10-CM

## 2017-03-27 DIAGNOSIS — E55.9 MILD VITAMIN D DEFICIENCY: ICD-10-CM

## 2017-03-27 DIAGNOSIS — R20.2 PARESTHESIA: ICD-10-CM

## 2017-03-27 DIAGNOSIS — K21.9 GASTROESOPHAGEAL REFLUX DISEASE WITHOUT ESOPHAGITIS: ICD-10-CM

## 2017-03-27 DIAGNOSIS — J45.30 MILD PERSISTENT ASTHMA WITHOUT COMPLICATION: ICD-10-CM

## 2017-03-27 LAB
25(OH)D3+25(OH)D2 SERPL-MCNC: 31 NG/ML
ALBUMIN SERPL BCP-MCNC: 4 G/DL
ALP SERPL-CCNC: 74 U/L
ALT SERPL W/O P-5'-P-CCNC: 17 U/L
ANION GAP SERPL CALC-SCNC: 8 MMOL/L
AST SERPL-CCNC: 21 U/L
BILIRUB SERPL-MCNC: 1.2 MG/DL
BUN SERPL-MCNC: 15 MG/DL
CALCIUM SERPL-MCNC: 9.7 MG/DL
CHLORIDE SERPL-SCNC: 106 MMOL/L
CHOLEST/HDLC SERPL: 2.7 {RATIO}
CO2 SERPL-SCNC: 28 MMOL/L
CREAT SERPL-MCNC: 0.8 MG/DL
EST. GFR  (AFRICAN AMERICAN): >60 ML/MIN/1.73 M^2
EST. GFR  (NON AFRICAN AMERICAN): >60 ML/MIN/1.73 M^2
GLUCOSE SERPL-MCNC: 116 MG/DL
HDL/CHOLESTEROL RATIO: 36.7 %
HDLC SERPL-MCNC: 158 MG/DL
HDLC SERPL-MCNC: 58 MG/DL
LDLC SERPL CALC-MCNC: 81.8 MG/DL
NONHDLC SERPL-MCNC: 100 MG/DL
POTASSIUM SERPL-SCNC: 3.8 MMOL/L
PROT SERPL-MCNC: 7.3 G/DL
SODIUM SERPL-SCNC: 142 MMOL/L
TRIGL SERPL-MCNC: 91 MG/DL
VIT B12 SERPL-MCNC: 352 PG/ML

## 2017-03-27 PROCEDURE — 1160F RVW MEDS BY RX/DR IN RCRD: CPT | Mod: S$GLB,,, | Performed by: INTERNAL MEDICINE

## 2017-03-27 PROCEDURE — 1159F MED LIST DOCD IN RCRD: CPT | Mod: S$GLB,,, | Performed by: INTERNAL MEDICINE

## 2017-03-27 PROCEDURE — 83036 HEMOGLOBIN GLYCOSYLATED A1C: CPT

## 2017-03-27 PROCEDURE — 2022F DILAT RTA XM EVC RTNOPTHY: CPT | Mod: S$GLB,,, | Performed by: INTERNAL MEDICINE

## 2017-03-27 PROCEDURE — 3044F HG A1C LEVEL LT 7.0%: CPT | Mod: S$GLB,,, | Performed by: INTERNAL MEDICINE

## 2017-03-27 PROCEDURE — 1126F AMNT PAIN NOTED NONE PRSNT: CPT | Mod: S$GLB,,, | Performed by: INTERNAL MEDICINE

## 2017-03-27 PROCEDURE — 80061 LIPID PANEL: CPT

## 2017-03-27 PROCEDURE — 36415 COLL VENOUS BLD VENIPUNCTURE: CPT

## 2017-03-27 PROCEDURE — 82607 VITAMIN B-12: CPT

## 2017-03-27 PROCEDURE — 99214 OFFICE O/P EST MOD 30 MIN: CPT | Mod: S$GLB,,, | Performed by: INTERNAL MEDICINE

## 2017-03-27 PROCEDURE — 99999 PR PBB SHADOW E&M-EST. PATIENT-LVL III: CPT | Mod: PBBFAC,,, | Performed by: INTERNAL MEDICINE

## 2017-03-27 PROCEDURE — 1157F ADVNC CARE PLAN IN RCRD: CPT | Mod: S$GLB,,, | Performed by: INTERNAL MEDICINE

## 2017-03-27 PROCEDURE — 99499 UNLISTED E&M SERVICE: CPT | Mod: S$GLB,,, | Performed by: INTERNAL MEDICINE

## 2017-03-27 PROCEDURE — 82306 VITAMIN D 25 HYDROXY: CPT

## 2017-03-27 PROCEDURE — 80053 COMPREHEN METABOLIC PANEL: CPT

## 2017-03-27 PROCEDURE — 3060F POS MICROALBUMINURIA REV: CPT | Mod: S$GLB,,, | Performed by: INTERNAL MEDICINE

## 2017-03-27 RX ORDER — PANTOPRAZOLE SODIUM 20 MG/1
20 TABLET, DELAYED RELEASE ORAL DAILY
Qty: 90 TABLET | Refills: 3 | Status: SHIPPED | OUTPATIENT
Start: 2017-03-27 | End: 2018-03-21 | Stop reason: SDUPTHER

## 2017-03-27 RX ORDER — FLUTICASONE PROPIONATE AND SALMETEROL 250; 50 UG/1; UG/1
1 POWDER RESPIRATORY (INHALATION) 2 TIMES DAILY
Qty: 3 EACH | Refills: 3 | Status: SHIPPED | OUTPATIENT
Start: 2017-03-27 | End: 2017-11-08 | Stop reason: SDUPTHER

## 2017-03-27 NOTE — MR AVS SNAPSHOT
[facility]  [address]  [facility] [address]  Phone: [phone]  Fax: [phone]                  Winnie Ngo   3/27/2017 9:30 AM   Office Visit    Description:  Female : 1945   Provider:  Manuela Merchant MD   Department:  [facility]           Reason for Visit     Follow-up           Diagnoses this Visit        Comments    Controlled type 2 diabetes mellitus without complication, without long-term current use of insulin    -  Primary     Immunization due         Mild persistent asthma without complication         Pure hypercholesterolemia         Uncontrolled type 2 diabetes mellitus without complication, without long-term current use of insulin         Gastroesophageal reflux disease without esophagitis         Mild vitamin D deficiency         Paresthesia                To Do List           Future Appointments        Provider Department Dept Phone    2017 10:00 AM [facility] [phone]      Goals (5 Years of Data)     None      Follow-Up and Disposition     Return in about 3 months (around 2017) for Follow up in 3 months with A1C.       These Medications        Disp Refills Start End    fluticasone-salmeterol 250-50 mcg/dose (ADVAIR) 250-50 mcg/dose diskus inhaler 3 each 3 3/27/2017 3/27/2018    Inhale 1 puff into the lungs 2 (two) times daily. - Inhalation    Pharmacy: [facility] - [address] Ph #: [phone]       pantoprazole (PROTONIX) 20 MG tablet 90 tablet 3 3/27/2017     Take 1 tablet (20 mg total) by mouth once daily. - Oral    Pharmacy: [id number] - [address] [facility] [address] Ph #: [phone]         [facility] On Call     [facility] On Call Nurse Care Line -  Assistance  Registered nurses in the [facility] On Call Center provide clinical advisement, health education, appointment booking,  and other advisory services.  Call for this free service at [phone].             Medications           Message regarding Medications     Verify the changes and/or additions to your medication regime listed below are the same as discussed with your clinician today.  If any of these changes or additions are incorrect, please notify your healthcare provider.        CHANGE how you are taking these medications     Start Taking Instead of    pantoprazole (PROTONIX) 20 MG tablet pantoprazole (PROTONIX) 20 MG tablet    Dosage:  Take 1 tablet (20 mg total) by mouth once daily. Dosage:  TAKE 1 TABLET BY MOUTH ONCE DAILY    Reason for Change:  Reorder            Verify that the below list of medications is an accurate representation of the medications you are currently taking.  If none reported, the list may be blank. If incorrect, please contact your healthcare provider. Carry this list with you in case of emergency.           Current Medications     albuterol 90 mcg/actuation inhaler Inhale 2 puffs into the lungs every 6 (six) hours as needed for Wheezing. May substitute any albuterol inhaler on her plan.    atorvastatin (LIPITOR) 10 MG tablet Take 1 tablet (10 mg total) by mouth every evening.    blood sugar diagnostic (TRUE METRIX GLUCOSE TEST STRIP) Strp 1 each by Misc.(Non-Drug; Combo Route) route once daily.    cholecalciferol, vitamin D3, (VITAMIN D3) 1,000 unit capsule Take 4,000 Units by mouth once daily. Per Dr. Flores    fluticasone-salmeterol 250-50 mcg/dose (ADVAIR) 250-50 mcg/dose diskus inhaler Inhale 1 puff into the lungs 2 (two) times daily.    lancets Misc 1 each by Misc.(Non-Drug; Combo Route) route once daily.    lorazepam (ATIVAN) 1 MG tablet TAKE 1 TABLET BY MOUTH EVERY EVENING AS NEEDED FOR PANIC ATTACKS    pantoprazole (PROTONIX) 20 MG tablet Take 1 tablet (20 mg total) by mouth once daily.           Clinical Reference Information           Your Vitals Were     BP Pulse Height Weight BMI     "130/70 70 5' 2" (1.575 m) 73 kg (160 lb 15 oz) 29.44 kg/m2      Blood Pressure          Most Recent Value    BP  130/70      Allergies as of 3/27/2017     Adhesive    Alendronate    Aspirin    Azithromycin    Macrodantin [Nitrofurantoin Macrocrystalline]    Montelukast    Moxifloxacin    Nitrofurantoin Monohyd/m-cryst    Penicillins    Sulfa (Sulfonamide Antibiotics)    Sulfur      Immunizations Administered on Date of Encounter - 3/27/2017     None      Orders Placed During Today's Visit      Normal Orders This Visit    Ambulatory consult to Podiatry     Ambulatory referral to Optometry     Future Labs/Procedures Expected by Expires    Comprehensive metabolic panel  3/27/2017 (Approximate) 5/26/2017    Hemoglobin A1c  3/27/2017 (Approximate) 5/26/2017    Lipid panel  3/27/2017 (Approximate) 5/26/2017    Microalbumin/creatinine urine ratio  3/27/2017 (Approximate) 5/26/2017    Vitamin B12  3/27/2017 5/26/2018    Vitamin D  3/27/2017 (Approximate) 5/26/2017    Hemoglobin A1c  6/25/2017 (Approximate) 7/25/2017      Language Assistance Services     ATTENTION: Language assistance services are available, free of charge. Please call [phone].      ATENCIÓN: Si habla chen, tiene a kirby disposición servicios gratuitos de asistencia lingüística. Llame al [phone].     CHÚ Ý: N?u b?n nói Ti?ng Vi?t, có các d?ch v? h? tr? ngôn ng? mi?n phí dành cho b?n. G?i s? [phone].         [facility] complies with applicable Federal civil rights laws and does not discriminate on the basis of race, color, national origin, age, disability, or sex.        "

## 2017-03-27 NOTE — PROGRESS NOTES
"Subjective:       Patient ID: Winnie Ngo is a 72 y.o. female.    Chief Complaint: Follow-up (Diabetes)    HPI   The patient's last appointment was on January 19 of 2017.  At that time she was diagnosed with diabetes and referred to diabetes education.  She was started on a statin.  Her blood pressure goal was set to be less than 139/89 initially.  She was asked to follow-up with me in one month.      Diabetes type 2  medication compliance: not on medication diabetic diet compliance: patient has been instructed home glucose monitoring: at most once a day    Patient is tolerating the cholesterol medication    /70  Pulse 70  Ht 5' 2" (1.575 m)  Wt 73 kg (160 lb 15 oz)  BMI 29.44 kg/m2,   Hemoglobin A1C   Date Value Ref Range Status   01/13/2017 6.1 4.5 - 6.2 % Final     Comment:     According to ADA guidelines, hemoglobin A1C <7.0% represents  optimal control in non-pregnant diabetic patients.  Different  metrics may apply to specific populations.   Standards of Medical Care in Diabetes - 2016.  For the purpose of screening for the presence of diabetes:  <5.7%     Consistent with the absence of diabetes  5.7-6.4%  Consistent with increasing risk for diabetes   (prediabetes)  >or=6.5%  Consistent with diabetes  Currently no consensus exists for use of hemoglobin A1C  for diagnosis of diabetes for children.     06/22/2016 6.5 (H) 4.5 - 6.2 % Final   09/10/2015 6.3 (H) 4.5 - 6.2 % Final   ,   Creatinine   Date Value Ref Range Status   01/13/2017 0.8 0.5 - 1.4 mg/dL Final   10/17/2016 0.7 0.5 - 1.4 mg/dL Final   06/16/2016 0.8 0.5 - 1.4 mg/dL Final       Lab Results   Component Value Date    LDLCALC 178.2 (H) 01/13/2017    LDLCALC 167.2 (H) 06/16/2016    LDLCALC 159.0 11/05/2014               Review of Systems   Constitutional: Negative for chills, fever and unexpected weight change.   HENT: Negative for trouble swallowing.    Respiratory: Negative for cough, shortness of breath and wheezing.  "   Cardiovascular: Negative for chest pain and palpitations.   Gastrointestinal: Negative for abdominal distention, abdominal pain, blood in stool and vomiting.   Musculoskeletal: Negative for back pain.       Objective:      Physical Exam   Constitutional: She is oriented to person, place, and time. She appears well-developed and well-nourished. No distress.   Neck: Carotid bruit is not present. No thyromegaly present.   Cardiovascular: Normal rate, regular rhythm and normal heart sounds.  PMI is not displaced.    Pulmonary/Chest: Effort normal and breath sounds normal. No respiratory distress.   Abdominal: Soft. Bowel sounds are normal. She exhibits no distension. There is no tenderness.   Musculoskeletal: She exhibits no edema.   Neurological: She is alert and oriented to person, place, and time.       Assessment:       1. Controlled type 2 diabetes mellitus without complication, without long-term current use of insulin    2. Immunization due    3. Mild persistent asthma without complication    4. Pure hypercholesterolemia    5. Uncontrolled type 2 diabetes mellitus without complication, without long-term current use of insulin    6. Gastroesophageal reflux disease without esophagitis    7. Mild vitamin D deficiency    8. Paresthesia        Plan:       Procedures Ordered This Visit      Ambulatory consult to Podiatry         Ambulatory referral to Optometry         Comprehensive metabolic panel         Hemoglobin A1c         Lipid panel         Microalbumin/creatinine urine ratio         Vitamin B12         Vitamin D         Winnie was seen today for follow-up.    Diagnoses and all orders for this visit:    Controlled type 2 diabetes mellitus without complication, without long-term current use of insulin: new onset    Immunization due: Tdap will check pharmacy    Mild persistent asthma without complication: stable will meds sent to long term pharmacy  -     fluticasone-salmeterol 250-50 mcg/dose (ADVAIR) 250-50  mcg/dose diskus inhaler; Inhale 1 puff into the lungs 2 (two) times daily.    Pure hypercholesterolemia: new statin use will check lab  -     Lipid panel; Future  -     Comprehensive metabolic panel; Future    Uncontrolled type 2 diabetes mellitus without complication, without long-term current use of insulin  -     Ambulatory referral to Optometry  -     Ambulatory consult to Podiatry  -     Microalbumin/creatinine urine ratio; Future  -     Hemoglobin A1c; Future    Gastroesophageal reflux disease without esophagitis  -     pantoprazole (PROTONIX) 20 MG tablet; Take 1 tablet (20 mg total) by mouth once daily.    Mild vitamin D deficiency: vit D has recently been changed to 4000 daily in [facility] for cancer prior it was 2000  -     Vitamin D; Future    Paresthesia: possibly related to diabetes or PPI  -     Vitamin B12; Future      Return in about 3 months (around 6/27/2017) for Follow up in 3 months with A1C.    New Prescriptions    No medications on file       Modified Medications    Modified Medication Previous Medication    FLUTICASONE-SALMETEROL 250-50 MCG/DOSE (ADVAIR) 250-50 MCG/DOSE DISKUS INHALER fluticasone-salmeterol 250-50 mcg/dose (ADVAIR) 250-50 mcg/dose diskus inhaler       Inhale 1 puff into the lungs 2 (two) times daily.    Inhale 1 puff into the lungs 2 (two) times daily.    PANTOPRAZOLE (PROTONIX) 20 MG TABLET pantoprazole (PROTONIX) 20 MG tablet       Take 1 tablet (20 mg total) by mouth once daily.    TAKE 1 TABLET BY MOUTH ONCE DAILY       Orders Placed This Encounter   Procedures    Microalbumin/creatinine urine ratio     Standing Status:   Future     Standing Expiration Date:   5/26/2017     Order Specific Question:   Specimen Source     Answer:   Urine    Lipid panel     Standing Status:   Future     Standing Expiration Date:   5/26/2017    Comprehensive metabolic panel     Standing Status:   Future     Standing Expiration Date:   5/26/2017    Vitamin B12     Standing Status:   Future      Standing Expiration Date:   5/26/2018    Vitamin D     Standing Status:   Future     Standing Expiration Date:   5/26/2017    Hemoglobin A1c     Standing Status:   Future     Standing Expiration Date:   5/26/2017    Hemoglobin A1c     Standing Status:   Future     Standing Expiration Date:   7/25/2017    Ambulatory referral to Optometry     Referral Priority:   Routine     Referral Type:   Vision (Optometry)     Referral Reason:   Specialty Services Required     Requested Specialty:   Optometry     Number of Visits Requested:   1    Ambulatory consult to Podiatry     Referral Priority:   Routine     Referral Type:   Consultation     Referral Reason:   Specialty Services Required     Requested Specialty:   Podiatry     Number of Visits Requested:   1       Labs, studies and consults associated with this visit were reviewed

## 2017-03-28 LAB
ESTIMATED AVG GLUCOSE: 131 MG/DL
HBA1C MFR BLD HPLC: 6.2 %

## 2017-03-29 ENCOUNTER — TELEPHONE (OUTPATIENT)
Dept: OPTOMETRY | Facility: CLINIC | Age: 72
End: 2017-03-29

## 2017-04-03 ENCOUNTER — TELEPHONE (OUTPATIENT)
Dept: ENDOSCOPY | Facility: HOSPITAL | Age: 72
End: 2017-04-03

## 2017-04-03 NOTE — TELEPHONE ENCOUNTER
Pt stated that she will call back to schedule colonoscopy with Dr. Feliciano after her clinic appointment, she would like to talk to him before scheduling her colonoscopy. Pt verified number [phone]. Pt was transferred to  to make a clinic appointment with Dr. Feliciano.

## 2017-05-25 DIAGNOSIS — F41.9 ANXIETY: ICD-10-CM

## 2017-05-25 RX ORDER — LORAZEPAM 1 MG/1
TABLET ORAL
Qty: 30 TABLET | Refills: 0 | Status: SHIPPED | OUTPATIENT
Start: 2017-05-25 | End: 2017-05-31 | Stop reason: SDUPTHER

## 2017-05-25 NOTE — TELEPHONE ENCOUNTER
Please see message and advise.   Patient requesting refill, and something for mucus in her throat. Refuse appointment until further notice.     Thank you

## 2017-05-25 NOTE — TELEPHONE ENCOUNTER
----- Message from Teresa Beach sent at 5/25/2017 12:03 PM CDT -----  Contact: Self/[phone] cell   Yellow and green phlem from her throat pt is asking if she can get a rx for doxycycline (VIBRA-TABS) 100 MG tablet to be sent to the pharmacy pt also stated that she needs a refill on her lorazepam (ATIVAN) 1 MG tablet pt would like for the rx's to be sent to [facility] [phone]. Please call and advise            Thank you

## 2017-05-30 ENCOUNTER — TELEPHONE (OUTPATIENT)
Dept: INTERNAL MEDICINE | Facility: CLINIC | Age: 72
End: 2017-05-30

## 2017-05-30 DIAGNOSIS — F41.9 ANXIETY: ICD-10-CM

## 2017-05-30 NOTE — TELEPHONE ENCOUNTER
----- Message from Codi Butler sent at 5/30/2017  2:18 PM CDT -----  Contact: patient, phone [phone]  The patient needs the Ativan cancelled and sent to [facility] in [place].      Please send to:    [facility] [address] [place] [address] - [facility] [address] [facility] AT [facility] [phone] (phone) [phone] (fax)\    Please call the patient and let her know this was done.     Thanks!

## 2017-05-31 RX ORDER — LORAZEPAM 1 MG/1
TABLET ORAL
Qty: 30 TABLET | Refills: 0 | Status: SHIPPED | OUTPATIENT
Start: 2017-05-31 | End: 2017-08-25 | Stop reason: SDUPTHER

## 2017-05-31 NOTE — TELEPHONE ENCOUNTER
----- Message from Ángel Mancia MA sent at 5/31/2017 11:17 AM CDT -----  Contact: Self/[phone]  Please advise patient is calling to check the status of medication lorazepam (ATIVAN) 1 MG tablet. Please thanks

## 2017-05-31 NOTE — TELEPHONE ENCOUNTER
Please cancel any lorazepam at the [facility] here and I have ordered the med to her pharmacy in [place]. GML

## 2017-06-08 ENCOUNTER — TELEPHONE (OUTPATIENT)
Dept: OPHTHALMOLOGY | Facility: CLINIC | Age: 72
End: 2017-06-08

## 2017-06-19 ENCOUNTER — TELEPHONE (OUTPATIENT)
Dept: OPTOMETRY | Facility: CLINIC | Age: 72
End: 2017-06-19

## 2017-08-25 DIAGNOSIS — F41.9 ANXIETY: ICD-10-CM

## 2017-08-25 RX ORDER — LORAZEPAM 1 MG/1
TABLET ORAL
Qty: 30 TABLET | Refills: 0 | Status: SHIPPED | OUTPATIENT
Start: 2017-08-25 | End: 2017-11-08 | Stop reason: SDUPTHER

## 2017-08-25 NOTE — TELEPHONE ENCOUNTER
----- Message from Love Gage sent at 8/24/2017  2:08 PM CDT -----  Contact: Pt at [phone]   RX request - refill or new RX.  Is this a refill or new RX:  refill  RX name and strength: lorazepam (ATIVAN) 1 MG tablet  Directions:   Is this a 30 day or 90 day RX:    Pharmacy name and phone #:[facility] [address] [place] [address] - [address] [place] [address] AT [facility] & [employer]   [phone] (Phone)  [phone] (Fax)      Comments:

## 2017-10-16 DIAGNOSIS — E11.9 TYPE 2 DIABETES MELLITUS WITHOUT COMPLICATION: ICD-10-CM

## 2017-11-08 DIAGNOSIS — F41.9 ANXIETY: ICD-10-CM

## 2017-11-08 DIAGNOSIS — J45.30 MILD PERSISTENT ASTHMA WITHOUT COMPLICATION: ICD-10-CM

## 2017-11-08 NOTE — TELEPHONE ENCOUNTER
----- Message from Riley Santiago sent at 11/8/2017  1:44 PM CST -----  Contact: Patient [phone]  RX request - refill or new RX.  Is this a refill or new Rx: Refill  RX name and strength:   fluticasone-salmeterol 250-50 mcg/dose (ADVAIR) 250-50 mcg/dose diskus inhaler 3 each  lorazepam (ATIVAN) 1 MG tablet 30 tablet     Is this a 30 day or 90 day RX:  30 day supply    Pharmacy name and phone # [id number] - [place], [place] - [address] [facility] AT [facility] [employer] [phone] (Phone)  [phone] (fax)    Please note to send this to [facility] in [place], she is out of town.    Thank you

## 2017-11-09 RX ORDER — LORAZEPAM 1 MG/1
TABLET ORAL
Qty: 30 TABLET | Refills: 0 | Status: SHIPPED | OUTPATIENT
Start: 2017-11-09 | End: 2018-01-16 | Stop reason: SDUPTHER

## 2017-11-09 RX ORDER — FLUTICASONE PROPIONATE AND SALMETEROL 250; 50 UG/1; UG/1
1 POWDER RESPIRATORY (INHALATION) 2 TIMES DAILY
Qty: 3 EACH | Refills: 3 | Status: SHIPPED | OUTPATIENT
Start: 2017-11-09 | End: 2018-12-03 | Stop reason: SDUPTHER

## 2018-01-11 ENCOUNTER — TELEPHONE (OUTPATIENT)
Dept: OPTOMETRY | Facility: CLINIC | Age: 73
End: 2018-01-11

## 2018-01-16 DIAGNOSIS — F41.9 ANXIETY: ICD-10-CM

## 2018-01-16 RX ORDER — LORAZEPAM 1 MG/1
TABLET ORAL
Qty: 30 TABLET | Refills: 0 | Status: SHIPPED | OUTPATIENT
Start: 2018-01-16 | End: 2018-03-12 | Stop reason: SDUPTHER

## 2018-01-25 ENCOUNTER — PES CALL (OUTPATIENT)
Dept: ADMINISTRATIVE | Facility: CLINIC | Age: 73
End: 2018-01-25

## 2018-02-19 RX ORDER — ATORVASTATIN CALCIUM 10 MG/1
10 TABLET, FILM COATED ORAL NIGHTLY
Qty: 90 TABLET | Refills: 3 | Status: SHIPPED | OUTPATIENT
Start: 2018-02-19 | End: 2019-03-27 | Stop reason: SDUPTHER

## 2018-02-19 NOTE — TELEPHONE ENCOUNTER
----- Message from Barb Parikh sent at 2/19/2018  2:05 PM CST -----  Contact: Self [phone]  Pt states she is out of town and needs a refill on her atorvastatin (LIPITOR) 10 MG  For a 3 month supply Sent to [facility] [phone],Please call

## 2018-03-06 DIAGNOSIS — E11.9 CONTROLLED TYPE 2 DIABETES MELLITUS WITHOUT COMPLICATION, WITHOUT LONG-TERM CURRENT USE OF INSULIN: ICD-10-CM

## 2018-03-06 RX ORDER — CALCIUM CITRATE/VITAMIN D3 200MG-6.25
TABLET ORAL
Qty: 50 STRIP | Refills: 0 | Status: SHIPPED | OUTPATIENT
Start: 2018-03-06 | End: 2023-01-27

## 2018-03-12 DIAGNOSIS — F41.9 ANXIETY: ICD-10-CM

## 2018-03-12 RX ORDER — LORAZEPAM 1 MG/1
TABLET ORAL
Qty: 30 TABLET | Refills: 0 | Status: SHIPPED | OUTPATIENT
Start: 2018-03-12 | End: 2018-05-11 | Stop reason: SDUPTHER

## 2018-03-21 DIAGNOSIS — K21.9 GASTROESOPHAGEAL REFLUX DISEASE WITHOUT ESOPHAGITIS: ICD-10-CM

## 2018-03-21 RX ORDER — PANTOPRAZOLE SODIUM 20 MG/1
TABLET, DELAYED RELEASE ORAL
Qty: 90 TABLET | Refills: 0 | Status: SHIPPED | OUTPATIENT
Start: 2018-03-21 | End: 2018-07-11 | Stop reason: SDUPTHER

## 2018-04-10 ENCOUNTER — PES CALL (OUTPATIENT)
Dept: ADMINISTRATIVE | Facility: CLINIC | Age: 73
End: 2018-04-10

## 2018-05-11 ENCOUNTER — TELEPHONE (OUTPATIENT)
Dept: INTERNAL MEDICINE | Facility: CLINIC | Age: 73
End: 2018-05-11

## 2018-05-11 DIAGNOSIS — F41.9 ANXIETY: ICD-10-CM

## 2018-05-11 RX ORDER — LORAZEPAM 1 MG/1
TABLET ORAL
Qty: 30 TABLET | Refills: 0 | Status: SHIPPED | OUTPATIENT
Start: 2018-05-11 | End: 2018-07-11 | Stop reason: SDUPTHER

## 2018-07-02 ENCOUNTER — PES CALL (OUTPATIENT)
Dept: ADMINISTRATIVE | Facility: CLINIC | Age: 73
End: 2018-07-02

## 2018-07-02 ENCOUNTER — TELEPHONE (OUTPATIENT)
Dept: ORTHOPEDICS | Facility: CLINIC | Age: 73
End: 2018-07-02

## 2018-07-02 NOTE — TELEPHONE ENCOUNTER
Tried contacting patient no voicemail set up.    ----- Message from Debora Reza sent at 7/2/2018 11:34 AM CDT -----  Contact: Pt   Pt says she need to come in sooner than 7/19 says she is in serious pain    Pt can be reached at  [phone] thanks

## 2018-07-11 ENCOUNTER — OFFICE VISIT (OUTPATIENT)
Dept: INTERNAL MEDICINE | Facility: CLINIC | Age: 73
End: 2018-07-11
Payer: MEDICARE

## 2018-07-11 ENCOUNTER — HOSPITAL ENCOUNTER (OUTPATIENT)
Dept: RADIOLOGY | Facility: HOSPITAL | Age: 73
Discharge: HOME | End: 2018-07-11
Attending: INTERNAL MEDICINE
Payer: MEDICARE

## 2018-07-11 VITALS
WEIGHT: 148.56 LBS | HEART RATE: 77 BPM | HEIGHT: 62 IN | OXYGEN SATURATION: 97 % | BODY MASS INDEX: 27.34 KG/M2 | DIASTOLIC BLOOD PRESSURE: 68 MMHG | SYSTOLIC BLOOD PRESSURE: 119 MMHG

## 2018-07-11 DIAGNOSIS — E53.8 B12 NUTRITIONAL DEFICIENCY: ICD-10-CM

## 2018-07-11 DIAGNOSIS — J45.30 MILD PERSISTENT ASTHMA WITHOUT COMPLICATION: ICD-10-CM

## 2018-07-11 DIAGNOSIS — M25.561 ACUTE PAIN OF RIGHT KNEE: ICD-10-CM

## 2018-07-11 DIAGNOSIS — E55.9 MILD VITAMIN D DEFICIENCY: ICD-10-CM

## 2018-07-11 DIAGNOSIS — F41.9 ANXIETY: ICD-10-CM

## 2018-07-11 DIAGNOSIS — M81.0 POSTMENOPAUSAL BONE LOSS: ICD-10-CM

## 2018-07-11 DIAGNOSIS — Z00.00 PHYSICAL EXAM: Primary | ICD-10-CM

## 2018-07-11 DIAGNOSIS — Z12.31 ENCOUNTER FOR SCREENING MAMMOGRAM FOR BREAST CANCER: ICD-10-CM

## 2018-07-11 DIAGNOSIS — K22.2 SCHATZKI'S RING: ICD-10-CM

## 2018-07-11 DIAGNOSIS — E78.00 PURE HYPERCHOLESTEROLEMIA: ICD-10-CM

## 2018-07-11 DIAGNOSIS — D05.12 DUCTAL CARCINOMA IN SITU (DCIS) OF LEFT BREAST: ICD-10-CM

## 2018-07-11 DIAGNOSIS — K21.9 GASTROESOPHAGEAL REFLUX DISEASE WITHOUT ESOPHAGITIS: ICD-10-CM

## 2018-07-11 DIAGNOSIS — E61.1 IRON DEFICIENCY: ICD-10-CM

## 2018-07-11 DIAGNOSIS — Z41.9 SURGERY, ELECTIVE: ICD-10-CM

## 2018-07-11 PROCEDURE — 73562 X-RAY EXAM OF KNEE 3: CPT | Mod: 26,RT,, | Performed by: RADIOLOGY

## 2018-07-11 PROCEDURE — 73560 X-RAY EXAM OF KNEE 1 OR 2: CPT | Mod: TC,59,LT

## 2018-07-11 PROCEDURE — 99499 UNLISTED E&M SERVICE: CPT | Mod: S$GLB,,, | Performed by: INTERNAL MEDICINE

## 2018-07-11 PROCEDURE — 99999 PR PBB SHADOW E&M-EST. PATIENT-LVL V: CPT | Mod: PBBFAC,,, | Performed by: INTERNAL MEDICINE

## 2018-07-11 PROCEDURE — 73562 X-RAY EXAM OF KNEE 3: CPT | Mod: TC,RT

## 2018-07-11 PROCEDURE — 73560 X-RAY EXAM OF KNEE 1 OR 2: CPT | Mod: 26,XS,LT, | Performed by: RADIOLOGY

## 2018-07-11 PROCEDURE — 3044F HG A1C LEVEL LT 7.0%: CPT | Mod: CPTII,S$GLB,, | Performed by: INTERNAL MEDICINE

## 2018-07-11 PROCEDURE — 99397 PER PM REEVAL EST PAT 65+ YR: CPT | Mod: S$GLB,,, | Performed by: INTERNAL MEDICINE

## 2018-07-11 RX ORDER — PANTOPRAZOLE SODIUM 20 MG/1
TABLET, DELAYED RELEASE ORAL
Qty: 90 TABLET | Refills: 3 | Status: SHIPPED | OUTPATIENT
Start: 2018-07-11 | End: 2019-02-04

## 2018-07-11 RX ORDER — LORAZEPAM 1 MG/1
TABLET ORAL
Qty: 30 TABLET | Refills: 1 | Status: SHIPPED | OUTPATIENT
Start: 2018-07-11 | End: 2018-10-22 | Stop reason: SDUPTHER

## 2018-07-11 NOTE — PATIENT INSTRUCTIONS
Outpatient Procedures Ordered This Visit     Ambulatory Referral to Breast Surgery         Ambulatory Referral to Diabetes Education         CBC auto differential         Comprehensive metabolic panel         Diabetic Eye Screening Photo         DXA Bone Density Spine And Hip         Hemoglobin A1c         Iron and TIBC         Lipid panel         Mammo Digital Screening Bilat with Tomosynthesis CAD         Microalbumin/creatinine urine ratio         Vitamin B12         Vitamin D         X-ray Knee Ortho Right

## 2018-07-11 NOTE — PROGRESS NOTES
Subjective:      Patient ID: Winnie Ngo is a 73 y.o. female.    Chief Complaint: Annual Exam    HPI:  HPI   Patient is here for her annual exam    Last surgery : Breast Surgery 10/21/2016 and 12/2/2016: patient was not able to follow up    Annual exam: 7/11/2018  Colonoscopy 2013 poor prep : colonoscopy: case request completed ( patient wishes to postpone this 7/11/2018) Dr. Figueroa  Endoscopy 6/28/12: gastric polyps schatzki ring   EUS   Mammogram : ordered  Gyn:complete hysterectomy and oophorectomy  Optho:Cataracts : patient will make appt  Flu: : due in the fall  Tetanus:?  Shingles:did not have chicken pox  Pneumovax: done 8/11/2008 second   Bone density     Consultants:  Dr. Chisholm       Patient Active Problem List   Diagnosis    History of asthma    Hyperlipidemia: without diabetes ASCVD Risk 15.5%   with diabetes 27.9% ( elevated glucoses)    GERD (gastroesophageal reflux disease)    Osteopenia    History of endoscopy    History of colonoscopy    Pulmonary nodules    History of mammogram    Annual physical exam    Kidney stones    Surgery, elective:Hysterectomy oophorectomy     Schatzki's ring    Atrophic vaginitis    History of colon cancer    Colon adenomas    Diarrhea    Nonspecific (abnormal) findings on radiological and other examination of abdominal area, including retroperitoneum    Mild vitamin D deficiency    DJD (degenerative joint disease) of lumbar spine, mild    Chronic LBP    Osteoarthritis of left knee, severe    Right lumbar radiculopathy    Trochanteric bursitis of right hip    Asthma, mild persistent    Elevated LDL cholesterol level    Elevated glucose: checking A1C 6/16/2016    Abnormal gastrointestinal PET scan    DCIS (ductal carcinoma in situ)    DCIS (ductal carcinoma in situ) of breast    Uncontrolled type 2 diabetes mellitus without complication, without long-term current use of insulin     Past Medical History:   Diagnosis Date    Allergy      "Anxiety     Asthma     Benign neoplasm of colon     Cancer     colon    Cataract     COPD (chronic obstructive pulmonary disease)     Fever blister     GERD (gastroesophageal reflux disease)     Hyperlipidemia     Joint pain     Osteopenia     Personal history of malignant neoplasm of large intestine      Past Surgical History:   Procedure Laterality Date    APPENDECTOMY      BREAST LUMPECTOMY Left 2016    BREAST SURGERY Right     lump removal    CHOLECYSTECTOMY      COLON SURGERY      stage T1 N0 M0    HYSTERECTOMY      KNEE SURGERY      arthroscopic    NASAL SEPTUM SURGERY      with removal of polyps    sigmoid colectomy      SINUS SURGERY      TOTAL ABDOMINAL HYSTERECTOMY W/ BILATERAL SALPINGOOPHORECTOMY      UPPER ENDOSCOPY W/ BANDING  2004    Schatzki's ring dialated 2 sessile polyps in stomach     Family History   Problem Relation Age of Onset    Alzheimer's disease Mother     Kidney disease Mother         kidney stone    Heart disease Father 41        MI    Stroke Father     Cancer Brother         leg    Cancer Brother         Kidney cancer    Cancer Maternal Uncle     Melanoma Neg Hx      Review of Systems   Constitutional: Negative for chills, fever and unexpected weight change.   HENT: Negative for trouble swallowing.    Respiratory: Negative for cough, shortness of breath and wheezing.    Cardiovascular: Negative for chest pain and palpitations.   Gastrointestinal: Negative for abdominal distention, abdominal pain, blood in stool and vomiting.   Musculoskeletal: Negative for back pain.     Objective:     Vitals:    07/11/18 1032   BP: 119/68   Pulse: 77   SpO2: 97%   Weight: 67.4 kg (148 lb 9.4 oz)   Height: 5' 2" (1.575 m)   PainSc:   7   PainLoc: Knee     Body mass index is 27.18 kg/m².  Physical Exam   Constitutional: She is oriented to person, place, and time. She appears well-developed and well-nourished. No distress.   Neck: Carotid bruit is not present. No thyromegaly " present.   Cardiovascular: Normal rate, regular rhythm and normal heart sounds.  PMI is not displaced.    Pulmonary/Chest: Effort normal and breath sounds normal. No respiratory distress.   Abdominal: Soft. Bowel sounds are normal. She exhibits no distension. There is no tenderness.   Musculoskeletal: She exhibits no edema.   Neurological: She is alert and oriented to person, place, and time.      Protective Sensation (w/ 10 gram monofilament):  Right: Intact  Left: Intact    Visual Inspection:  Normal -  Right     Pedal Pulses:   Right: Present  Left: Present    Posterior tibialis:   Right:Present  Left: Present      Assessment:     1. Physical exam    2. Uncontrolled type 2 diabetes mellitus without complication, without long-term current use of insulin    3. Surgery, elective:Hysterectomy oophorectomy     4. Schatzki's ring    5. Mild vitamin D deficiency    6. Pure hypercholesterolemia    7. Ductal carcinoma in situ (DCIS) of left breast    8. Mild persistent asthma without complication    9. Acute pain of right knee    10. Encounter for screening mammogram for breast cancer    11. B12 nutritional deficiency    12. Iron deficiency    13. Postmenopausal bone loss    14. Gastroesophageal reflux disease without esophagitis    15. Anxiety      Plan:   Winnie was seen today for annual exam.    Diagnoses and all orders for this visit:    Physical exam: updated and reviewed    Uncontrolled type 2 diabetes mellitus without complication, without long-term current use of insulin: check A1C  -     CBC auto differential; Future  -     Comprehensive metabolic panel; Future  -     Lipid panel; Future  -     Hemoglobin A1c; Future  -     Microalbumin/creatinine urine ratio; Future  -     Diabetic Eye Screening Photo; Future  -     Ambulatory Referral to Diabetes Education    Surgery, elective:Hysterectomy oophorectomy :    Schatzki's ring: stable    Mild vitamin D deficiency: check lab  -     Vitamin D; Future    Pure  hypercholesterolemia: check lab    Ductal carcinoma in situ (DCIS) of left breast  -     Ambulatory Referral to Breast Surgery  -     Mammo Digital Screening Bilat with Tomosynthesis CAD; Future    Mild persistent asthma without complication    Acute pain of right knee  -     X-ray Knee Ortho Right; Future    Encounter for screening mammogram for breast cancer  -     Mammo Digital Screening Bilat with Tomosynthesis CAD; Future    B12 nutritional deficiency  -     Vitamin B12; Future    Iron deficiency  -     Iron and TIBC; Future    Postmenopausal bone loss  -     DXA Bone Density Spine And Hip; Future    Gastroesophageal reflux disease without esophagitis  -     pantoprazole (PROTONIX) 20 MG tablet; TAKE 1 TABLET(20 MG) BY MOUTH EVERY DAY    Anxiety  -     LORazepam (ATIVAN) 1 MG tablet; TAKE 1 TABLET BY MOUTH EVERY EVENING AS NEEDED FOR PANIC ATTACKS      Follow-up in about 6 months (around 1/11/2019) for Follow up diabetes.     Medication List          Accurate as of 7/11/18 11:59 PM. If you have any questions, ask your nurse or doctor.               CHANGE how you take these medications    pantoprazole 20 MG tablet  Commonly known as:  PROTONIX  TAKE 1 TABLET(20 MG) BY MOUTH EVERY DAY  What changed:  See the new instructions.  Changed by:  Manuela Merchant MD        CONTINUE taking these medications    albuterol 90 mcg/actuation inhaler  Inhale 2 puffs into the lungs every 6 (six) hours as needed for Wheezing. May substitute any albuterol inhaler on her plan.     atorvastatin 10 MG tablet  Commonly known as:  LIPITOR  Take 1 tablet (10 mg total) by mouth every evening.     fluticasone-salmeterol 250-50 mcg/dose 250-50 mcg/dose diskus inhaler  Commonly known as:  ADVAIR  Inhale 1 puff into the lungs 2 (two) times daily.     lancets Misc  1 each by Misc.(Non-Drug; Combo Route) route once daily.     LORazepam 1 MG tablet  Commonly known as:  ATIVAN  TAKE 1 TABLET BY MOUTH EVERY EVENING AS NEEDED FOR PANIC ATTACKS      TRUE METRIX GLUCOSE TEST STRIP Strp  Generic drug:  blood sugar diagnostic  USE TO MEASURE BLOOD SUGAR DAILY     VITAMIN D3 1,000 unit capsule  Generic drug:  cholecalciferol (vitamin D3)           Where to Get Your Medications      These medications were sent to [id number] - [place] [address] AT [facility] [address], [place] [address]    Phone:  [phone]   · pantoprazole 20 MG tablet     You can get these medications from any pharmacy    Bring a paper prescription for each of these medications  · LORazepam 1 MG tablet

## 2018-07-19 ENCOUNTER — OFFICE VISIT (OUTPATIENT)
Dept: ORTHOPEDICS | Facility: CLINIC | Age: 73
End: 2018-07-19
Payer: MEDICARE

## 2018-07-19 VITALS
DIASTOLIC BLOOD PRESSURE: 64 MMHG | WEIGHT: 148 LBS | HEIGHT: 62 IN | BODY MASS INDEX: 27.23 KG/M2 | SYSTOLIC BLOOD PRESSURE: 131 MMHG

## 2018-07-19 DIAGNOSIS — M17.11 PRIMARY OSTEOARTHRITIS OF RIGHT KNEE: Primary | ICD-10-CM

## 2018-07-19 PROCEDURE — 99202 OFFICE O/P NEW SF 15 MIN: CPT | Mod: S$GLB,,, | Performed by: ORTHOPAEDIC SURGERY

## 2018-07-19 PROCEDURE — 99999 PR PBB SHADOW E&M-EST. PATIENT-LVL III: CPT | Mod: PBBFAC,,, | Performed by: ORTHOPAEDIC SURGERY

## 2018-07-19 NOTE — PROGRESS NOTES
Subjective:      Patient ID: Winnie Ngo is a 73 y.o. female.    Chief Complaint: Pain of the Right Knee    HPI     They have experienced problems with their right knee over the past 1 month. The patient denies relevant history of injury/aggravation.  She did climb a lot of stairs which may have played a role.  She has a history of arthroscopy many years ago.  Pain is located diffusely Associated symptoms include swelling.  Symptoms are aggravated by prolonged sitting. They have been treated with NA.   Symptoms have recently stayed the same. Ambulation reportedly has not been impaired. Self care ADLs are not painful.     Review of Systems   Constitution: Negative for fever and weight loss.   HENT: Negative for congestion.    Eyes: Negative for visual disturbance.   Cardiovascular: Negative for chest pain.   Respiratory: Negative for shortness of breath.    Hematologic/Lymphatic: Negative for bleeding problem. Does not bruise/bleed easily.   Skin: Negative for poor wound healing.   Musculoskeletal: Positive for joint pain.   Gastrointestinal: Negative for abdominal pain.   Genitourinary: Negative for dysuria.   Neurological: Negative for seizures.   Psychiatric/Behavioral: Negative for altered mental status.   Allergic/Immunologic: Negative for persistent infections.         Objective:            Ortho/SPM Exam    Right knee-    [unfilled]    The patient is not in acute distress.   Body habitus is normal.   The patient walks with a limp.  Resisted SLR negative.   The skin over the knee is intact.  Knee effusion 1+  Tendernes is located:  Absent  Range of motion- Flexion 85 deg, Extension 10 deg,   Ligament exam:   MCL 1+ laxity   Lachman intact   Post sag intact    LCL intact  Patellar apprehension negative.  Popliteal cyst negative  Patellar crepitation present.  Flexion/pinch negative.  Pulses DP present, PT present.  Motor normal 5/5 strength in all tested muscle groups.   Sensory normal    I reviewed the  relevant radiographic images for the patient's condition:  Recent films show complete loss of medial joint space with osteophytes and varus deformity.            Assessment:       No diagnosis found.     objective findings are structurally advanced  Plan:       There are no diagnoses linked to this encounter.  I explained my diagnostic impression and the reasoning behind it in detail, using layman's terms.  Models and/or pictures were used to help in the explanation.    Natural history explained    Injection recommended and patient declined    Cane recommended    Sleeve requested and instructions given for this    Tylenol products recommended    I explained the potential role of surgery in the treatment of this condition to the patient.  They understand that if nonsurgical measures do not adequately control symptoms, surgery will be considered in the future.

## 2018-07-24 ENCOUNTER — CLINICAL SUPPORT (OUTPATIENT)
Dept: DIABETES | Facility: CLINIC | Age: 73
End: 2018-07-24
Payer: MEDICARE

## 2018-07-24 PROCEDURE — G0108 DIAB MANAGE TRN  PER INDIV: HCPCS | Mod: S$GLB,,, | Performed by: INTERNAL MEDICINE

## 2018-07-24 NOTE — PROGRESS NOTES
Diabetes Education  Author: Smita Davis RD, CDE  Date: 7/24/2018    Diabetes Education Visit  Diabetes Education Record Assessment/Progress: Initial    Diabetes Type  Diabetes Type : Type II    Lab Results   Component Value Date    HGBA1C 5.7 (H) 07/11/2018    HGBA1C 5.7 (H) 07/11/2018     Reviewed diabetes standards of care, current diet intake. SMBG 3x/wk. Doing wonderfully managing diabetes. Seen over a year ago by ESTELA Godwin for diabetes education.     Nutrition  Meal Planning: 3 meals per day    Monitoring   Self Monitoring : few times per week   Blood Glucose Logs: No  Do you use a personal glucose monitor?: No  Can you tell when your blood sugar is too high?: no    Current Diabetes Treatment   Current Treatment: Diet    Social History  Preferred Learning Method: Face to Face    Barriers to Change  Barriers to Change: None  Learning Challenges : None    Readiness to Learn   Readiness to Learn : Eager    Diabetes Education Assessment/Progress  Diabetes Disease Process (diabetes disease process and treatment options): Discussion, Individual Session, Written Materials Provided  Nutrition (Incorporating nutritional management into one's lifestyle): Discussion  Physical Activity (incorporating physical activity into one's lifestyle): Discussion, Individual Session  Medications (states correct name, dose, onset, peak, duration, side effects & timing of meds): Discussion, Individual Session  Monitoring (monitoring blood glucose/other parameters & using results): Discussion, Individual Session, Written Materials Provided  Acute Complications (preventing, detecting, and treating acute complications): Discussion  Chronic Complications (preventing, detecting, and treating chronic complications): Discussion  Clinical (diabetes, other pertinent medical history, and relevant comorbidities reviewed during visit): Discussion  Cognitive (knowledge of self-management skills, functional health literacy):  Discussion  Psychosocial (emotional response to diabetes): Discussion  Diabetes Distress and Support Systems: Discussion  Behavioral (readiness for change, lifestyle practices, self-care behaviors): Discussion    Goals  Patient has selected/evaluated goals during today's session: Yes, evaluated  Monitoring: % Met  Met Percentage : 100%    Diabetes Care Plan/Intervention  Education Plan/Intervention: Individual Follow-Up DSMT    Diabetes Meal Plan  Carbohydrate Per Meal: 30-45g  Carbohydrate Per Snack : 15-20g    Education Units of Time   Time Spent: 60 min    Health Maintenance was reviewed today with patient. Discussed with patient importance of routine eye exams, foot exams/foot care, blood work (i.e.: A1c, microalbumin, and lipid), dental visits, yearly flu vaccine, and pneumonia vaccine as indicated by PCP. Patient verbalized understanding.     Health Maintenance Topics with due status: Not Due       Topic Last Completion Date    Colonoscopy 03/25/2013    Influenza Vaccine 10/04/2016    Foot Exam 07/11/2018    Lipid Panel 07/11/2018    Hemoglobin A1c 07/11/2018    Urine Microalbumin 07/11/2018    Low Dose Statin 07/19/2018     Health Maintenance Due   Topic Date Due    TETANUS VACCINE  06/04/2008    Mammogram  09/14/2017    DEXA SCAN  11/10/2017    Eye Exam  03/27/2018

## 2018-08-08 ENCOUNTER — HOSPITAL ENCOUNTER (OUTPATIENT)
Dept: RADIOLOGY | Facility: CLINIC | Age: 73
Discharge: HOME | End: 2018-08-08
Attending: INTERNAL MEDICINE
Payer: MEDICARE

## 2018-08-08 DIAGNOSIS — M81.0 POSTMENOPAUSAL BONE LOSS: ICD-10-CM

## 2018-08-08 PROCEDURE — 77080 DXA BONE DENSITY AXIAL: CPT | Mod: TC

## 2018-08-08 PROCEDURE — 77080 DXA BONE DENSITY AXIAL: CPT | Mod: 26,,, | Performed by: INTERNAL MEDICINE

## 2018-09-24 ENCOUNTER — TELEPHONE (OUTPATIENT)
Dept: SURGERY | Facility: CLINIC | Age: 73
End: 2018-09-24

## 2018-09-24 NOTE — TELEPHONE ENCOUNTER
Returned call to patient, pt states she has been out of state and has recently returned, pt over due for follow up and mammogram, appts scheduled and confirmed per pt request, all questions answered at this time

## 2018-10-08 ENCOUNTER — HOSPITAL ENCOUNTER (OUTPATIENT)
Dept: RADIOLOGY | Facility: HOSPITAL | Age: 73
Discharge: HOME | End: 2018-10-08
Attending: INTERNAL MEDICINE
Payer: MEDICARE

## 2018-10-08 ENCOUNTER — OFFICE VISIT (OUTPATIENT)
Dept: SURGERY | Facility: CLINIC | Age: 73
End: 2018-10-08
Payer: MEDICARE

## 2018-10-08 VITALS — HEIGHT: 62 IN | BODY MASS INDEX: 27.22 KG/M2 | WEIGHT: 147.94 LBS

## 2018-10-08 DIAGNOSIS — Z85.3 PERSONAL HISTORY OF BREAST CANCER: Primary | ICD-10-CM

## 2018-10-08 DIAGNOSIS — D05.12 DUCTAL CARCINOMA IN SITU (DCIS) OF LEFT BREAST: ICD-10-CM

## 2018-10-08 DIAGNOSIS — Z12.31 ENCOUNTER FOR SCREENING MAMMOGRAM FOR BREAST CANCER: ICD-10-CM

## 2018-10-08 PROCEDURE — 99213 OFFICE O/P EST LOW 20 MIN: CPT | Mod: S$PBB,,, | Performed by: SURGERY

## 2018-10-08 PROCEDURE — 99999 PR PBB SHADOW E&M-EST. PATIENT-LVL III: CPT | Mod: PBBFAC,,, | Performed by: SURGERY

## 2018-10-08 PROCEDURE — 77062 BREAST TOMOSYNTHESIS BI: CPT | Mod: 26,,, | Performed by: RADIOLOGY

## 2018-10-08 PROCEDURE — 99213 OFFICE O/P EST LOW 20 MIN: CPT | Mod: PBBFAC | Performed by: SURGERY

## 2018-10-08 PROCEDURE — 77066 DX MAMMO INCL CAD BI: CPT | Mod: 26,,, | Performed by: RADIOLOGY

## 2018-10-08 PROCEDURE — 77066 DX MAMMO INCL CAD BI: CPT | Mod: TC,PO

## 2018-10-08 PROCEDURE — 1101F PT FALLS ASSESS-DOCD LE1/YR: CPT | Mod: CPTII,,, | Performed by: SURGERY

## 2018-10-08 NOTE — PROGRESS NOTES
DIAGNOSIS:    This is a 71 y.o. female with a stage 0 pTis Nx Mx grade 3 ER + [place] + DCIS of the left breast.      TREATMENT SUMMARY:  The patient is status post left partial mastectomy re-excision on 12/02/2016. Her initial left partial mastectomy on 10/21/2016. Final pathology was negative. On her last visit in January 2017, she had a seroma, which was drained in the office.    INTERVAL HISTORY:   Winnie Ngo comes in for follow-up about two years from her initial surgery. She had been out of state and has not been in the office since January 2017. She denies fever, chills, chest pain or shortness of breath. She has no pain complaint. She has lost about 45 lbs after being diagnosed with prediabetes. She lost the weight by cutting out sugar and walking.    MEDICATIONS:  Current Outpatient Medications   Medication Sig Dispense Refill    albuterol 90 mcg/actuation inhaler Inhale 2 puffs into the lungs every 6 (six) hours as needed for Wheezing. May substitute any albuterol inhaler on her plan. 1 Inhaler 12    atorvastatin (LIPITOR) 10 MG tablet Take 1 tablet (10 mg total) by mouth every evening. 90 tablet 3    cholecalciferol, vitamin D3, (VITAMIN D3) 1,000 unit capsule Take 4,000 Units by mouth once daily. Per Dr. Flores      fluticasone-salmeterol 250-50 mcg/dose (ADVAIR) 250-50 mcg/dose diskus inhaler Inhale 1 puff into the lungs 2 (two) times daily. 3 each 3    lancets Misc 1 each by Misc.(Non-Drug; Combo Route) route once daily. 30 each 11    LORazepam (ATIVAN) 1 MG tablet TAKE 1 TABLET BY MOUTH EVERY EVENING AS NEEDED FOR PANIC ATTACKS 30 tablet 1    pantoprazole (PROTONIX) 20 MG tablet TAKE 1 TABLET(20 MG) BY MOUTH EVERY DAY 90 tablet 3    TRUE METRIX GLUCOSE TEST STRIP Strp USE TO MEASURE BLOOD SUGAR DAILY 50 strip 0     No current facility-administered medications for this visit.        ALLERGIES:   Review of patient's allergies indicates:   Allergen Reactions    Adhesive      Other reaction(s):  Rash    Alendronate      Other reaction(s): Stomach upset  Other reaction(s): Stomach upset    Aspirin      Other reaction(s): Wheezing    Azithromycin Other (See Comments)    Macrodantin [nitrofurantoin macrocrystalline]     Montelukast      Other reaction(s): Diarrhea  Other reaction(s): Diarrhea    Moxifloxacin      Other reaction(s): Diarrhea  Other reaction(s): Diarrhea    Nitrofurantoin monohyd/m-cryst      Other reaction(s): Nausea  Other reaction(s): Nausea    Penicillins      Other reaction(s): Rash    Sulfa (sulfonamide antibiotics)      Other reaction(s): Swelling  Other reaction(s): Hives    Sulfur      Other reaction(s): Hives  Other reaction(s): Swelling       PHYSICAL EXAMINATION:   General:  This is a well appearing female with appropriate speech, affect and gait.     Breast:  Incision clean, dry, and intact;non-tender, no increased warmth to touch. Inverted nipple on the left, which has been stable since surgery.    PATHOLOGY:  12/2/2016: #1 LEFT BREAST, LEFT ANTERIOR SUPERIOR MARGIN: NO RESIDUAL DUCTAL CARCINOMA IN SITU IDENTIFIED., #2 LEFT BREAST, ADDITIONAL ANTERIOR MARGIN: NO RESIDUAL DUCTAL CARCINOMA IN SITU IDENTIFIED., #3 LEFT BREAST, ADDITIONAL SUPERIOR MARGIN: NO RESIDUAL DUCTAL CARCINOMA IN SITU IDENTIFIED.      10/21/2016:  LEFT BREAST LEFT BREAST (WIRE LOCALIZATION LUMPECTOMY):  -DUCTAL CARCINOMA IN SITU, see synoptic report below  DUCTAL CARCINOMA IN SITU SYNOPTIC REPORT  -Procedure: Excision with wire-guided localization  -No lymph node sampling  -Specimen laterality: Left  -Size (extent) of DCIS: 14 mm in this specimen  -Histologic type: Ductal carcinoma in situ  -Architectural patterns: Solid  -Nuclear grade: High nuclear grade (grade 3 out of 3)  -Necrosis: Present, central expansive comedonecrosis  -Associated with microcalcifications  -Biopsy clip is present  -Margins:  -SUPERIOR margin is POSITIVE for ductal carcinoma in situ  -Other distance to closest anterior margin  is less than 1 mm  -Other margins are negative for DCIS  -Pathologic staging: pTis NX MX  -Hormone receptors (performed on core biopsy):    -ER: Positive   -MO: Positive      IMPRESSION:   GUANAKITO    PLAN:   1. Return in 12 months for a follow up office visit, bilateral mammogram and breast exam IF mammogram today is normal  2. The patient is advised in continued exam of the breast chest wall and to report to this office sooner should she note any areas of abnormality or concern.

## 2018-10-22 DIAGNOSIS — F41.9 ANXIETY: ICD-10-CM

## 2018-10-22 RX ORDER — LORAZEPAM 1 MG/1
TABLET ORAL
Qty: 30 TABLET | Refills: 0 | Status: SHIPPED | OUTPATIENT
Start: 2018-10-22 | End: 2019-02-05 | Stop reason: SDUPTHER

## 2018-10-22 NOTE — TELEPHONE ENCOUNTER
----- Message from Ana Do sent at 10/22/2018  4:40 PM CDT -----  Contact: [phone]  Patient is calling to check the status of her medication LORazepam (ATIVAN) 1 MG tablet refill.    Please advise, thank you

## 2018-11-05 ENCOUNTER — PES CALL (OUTPATIENT)
Dept: ADMINISTRATIVE | Facility: CLINIC | Age: 73
End: 2018-11-05

## 2018-11-12 ENCOUNTER — TELEPHONE (OUTPATIENT)
Dept: INTERNAL MEDICINE | Facility: CLINIC | Age: 73
End: 2018-11-12

## 2018-11-12 DIAGNOSIS — R07.81 RIB PAIN ON LEFT SIDE: Primary | ICD-10-CM

## 2018-11-12 NOTE — TELEPHONE ENCOUNTER
Spoke to the patient, states that she was sitting in a chair and  was bending over screwing a screw into the table. She felt pain in her ribs, requesting an xray on her left side ribs. Patient also states when she cough, it hurts.     Requesting xray to be done at [place].     Please advise. Thank you

## 2018-11-12 NOTE — TELEPHONE ENCOUNTER
----- Message from Naima Smith sent at 11/12/2018 10:05 AM CST -----  Contact: self/[phone]  .1 Patient would like to get medical advice.  Symptoms (please be specific):   How long has patient had these symptoms:patient bend over and felt snap on the rib, late Friday night.  [id number]  [place] - [address] AT [facility] [address] [phone] (Phone)  [phone] (Fax)  Any drug allergies: onfile  Comments: Patient would like an order for xray, just to make sure that everything as far as her ribs are okay.Patient would like to get medical advice. Thank you

## 2018-11-13 ENCOUNTER — IMMUNIZATION (OUTPATIENT)
Dept: FAMILY MEDICINE | Facility: CLINIC | Age: 73
End: 2018-11-13
Payer: MEDICARE

## 2018-11-13 ENCOUNTER — HOSPITAL ENCOUNTER (OUTPATIENT)
Dept: RADIOLOGY | Facility: HOSPITAL | Age: 73
Discharge: HOME | End: 2018-11-13
Attending: INTERNAL MEDICINE
Payer: MEDICARE

## 2018-11-13 DIAGNOSIS — Z23 NEED FOR INFLUENZA VACCINATION: Primary | ICD-10-CM

## 2018-11-13 DIAGNOSIS — R07.81 RIB PAIN ON LEFT SIDE: ICD-10-CM

## 2018-11-13 PROCEDURE — 90662 IIV NO PRSV INCREASED AG IM: CPT | Mod: S$GLB,,, | Performed by: FAMILY MEDICINE

## 2018-11-13 PROCEDURE — 71100 X-RAY EXAM RIBS UNI 2 VIEWS: CPT | Mod: TC,FY,PO

## 2018-11-13 PROCEDURE — G0008 ADMIN INFLUENZA VIRUS VAC: HCPCS | Mod: S$GLB,,, | Performed by: FAMILY MEDICINE

## 2018-11-13 PROCEDURE — 71100 X-RAY EXAM RIBS UNI 2 VIEWS: CPT | Mod: 26,,, | Performed by: RADIOLOGY

## 2018-12-03 DIAGNOSIS — J45.30 MILD PERSISTENT ASTHMA WITHOUT COMPLICATION: ICD-10-CM

## 2018-12-03 RX ORDER — FLUTICASONE PROPIONATE AND SALMETEROL 50; 250 UG/1; UG/1
POWDER RESPIRATORY (INHALATION)
Qty: 180 EACH | Refills: 3 | Status: SHIPPED | OUTPATIENT
Start: 2018-12-03 | End: 2019-10-21

## 2019-01-24 DIAGNOSIS — E11.9 TYPE 2 DIABETES MELLITUS WITHOUT COMPLICATION: ICD-10-CM

## 2019-02-04 ENCOUNTER — OFFICE VISIT (OUTPATIENT)
Dept: INTERNAL MEDICINE | Facility: CLINIC | Age: 74
End: 2019-02-04
Payer: MEDICARE

## 2019-02-04 ENCOUNTER — LAB VISIT (OUTPATIENT)
Dept: LAB | Facility: HOSPITAL | Age: 74
End: 2019-02-04
Attending: INTERNAL MEDICINE
Payer: MEDICARE

## 2019-02-04 VITALS
HEART RATE: 81 BPM | BODY MASS INDEX: 28.76 KG/M2 | HEIGHT: 62 IN | SYSTOLIC BLOOD PRESSURE: 119 MMHG | OXYGEN SATURATION: 97 % | DIASTOLIC BLOOD PRESSURE: 64 MMHG | WEIGHT: 156.31 LBS

## 2019-02-04 DIAGNOSIS — Z85.038 HISTORY OF COLON CANCER: ICD-10-CM

## 2019-02-04 DIAGNOSIS — H91.90 DECREASED HEARING, UNSPECIFIED LATERALITY: Primary | ICD-10-CM

## 2019-02-04 DIAGNOSIS — K21.9 GASTROESOPHAGEAL REFLUX DISEASE WITHOUT ESOPHAGITIS: ICD-10-CM

## 2019-02-04 DIAGNOSIS — M25.561 RIGHT KNEE PAIN, UNSPECIFIED CHRONICITY: ICD-10-CM

## 2019-02-04 LAB
ALBUMIN SERPL BCP-MCNC: 4.2 G/DL
ALP SERPL-CCNC: 71 U/L
ALT SERPL W/O P-5'-P-CCNC: 15 U/L
ANION GAP SERPL CALC-SCNC: 9 MMOL/L
AST SERPL-CCNC: 19 U/L
BASOPHILS # BLD AUTO: 0.05 K/UL
BASOPHILS NFR BLD: 1.2 %
BILIRUB SERPL-MCNC: 1.3 MG/DL
BUN SERPL-MCNC: 10 MG/DL
CALCIUM SERPL-MCNC: 10.3 MG/DL
CEA SERPL-MCNC: 1.9 NG/ML
CHLORIDE SERPL-SCNC: 105 MMOL/L
CO2 SERPL-SCNC: 27 MMOL/L
CREAT SERPL-MCNC: 0.8 MG/DL
DIFFERENTIAL METHOD: ABNORMAL
EOSINOPHIL # BLD AUTO: 0.1 K/UL
EOSINOPHIL NFR BLD: 3.5 %
ERYTHROCYTE [DISTWIDTH] IN BLOOD BY AUTOMATED COUNT: 12.5 %
EST. GFR  (AFRICAN AMERICAN): >60 ML/MIN/1.73 M^2
EST. GFR  (NON AFRICAN AMERICAN): >60 ML/MIN/1.73 M^2
ESTIMATED AVG GLUCOSE: 128 MG/DL
GLUCOSE SERPL-MCNC: 116 MG/DL
HBA1C MFR BLD HPLC: 6.1 %
HCT VFR BLD AUTO: 40.4 %
HGB BLD-MCNC: 13 G/DL
LYMPHOCYTES # BLD AUTO: 1.3 K/UL
LYMPHOCYTES NFR BLD: 32.8 %
MCH RBC QN AUTO: 31.6 PG
MCHC RBC AUTO-ENTMCNC: 32.2 G/DL
MCV RBC AUTO: 98 FL
MONOCYTES # BLD AUTO: 0.3 K/UL
MONOCYTES NFR BLD: 7.7 %
NEUTROPHILS # BLD AUTO: 2.2 K/UL
NEUTROPHILS NFR BLD: 54.8 %
PLATELET # BLD AUTO: 191 K/UL
PMV BLD AUTO: 10.6 FL
POTASSIUM SERPL-SCNC: 4.1 MMOL/L
PROT SERPL-MCNC: 7.4 G/DL
RBC # BLD AUTO: 4.12 M/UL
SODIUM SERPL-SCNC: 141 MMOL/L
WBC # BLD AUTO: 4.02 K/UL

## 2019-02-04 PROCEDURE — 99999 PR PBB SHADOW E&M-EST. PATIENT-LVL V: CPT | Mod: PBBFAC,,, | Performed by: INTERNAL MEDICINE

## 2019-02-04 PROCEDURE — 82378 CARCINOEMBRYONIC ANTIGEN: CPT

## 2019-02-04 PROCEDURE — 99499 UNLISTED E&M SERVICE: CPT | Mod: S$GLB,,, | Performed by: INTERNAL MEDICINE

## 2019-02-04 PROCEDURE — 99214 PR OFFICE/OUTPT VISIT, EST, LEVL IV, 30-39 MIN: ICD-10-PCS | Mod: S$GLB,,, | Performed by: INTERNAL MEDICINE

## 2019-02-04 PROCEDURE — 36415 COLL VENOUS BLD VENIPUNCTURE: CPT

## 2019-02-04 PROCEDURE — 85025 COMPLETE CBC W/AUTO DIFF WBC: CPT

## 2019-02-04 PROCEDURE — 99999 PR PBB SHADOW E&M-EST. PATIENT-LVL V: ICD-10-PCS | Mod: PBBFAC,,, | Performed by: INTERNAL MEDICINE

## 2019-02-04 PROCEDURE — 83036 HEMOGLOBIN GLYCOSYLATED A1C: CPT

## 2019-02-04 PROCEDURE — 99214 OFFICE O/P EST MOD 30 MIN: CPT | Mod: S$GLB,,, | Performed by: INTERNAL MEDICINE

## 2019-02-04 PROCEDURE — 1101F PR PT FALLS ASSESS DOC 0-1 FALLS W/OUT INJ PAST YR: ICD-10-PCS | Mod: CPTII,S$GLB,, | Performed by: INTERNAL MEDICINE

## 2019-02-04 PROCEDURE — 80053 COMPREHEN METABOLIC PANEL: CPT

## 2019-02-04 PROCEDURE — 99499 RISK ADDL DX/OHS AUDIT: ICD-10-PCS | Mod: S$GLB,,, | Performed by: INTERNAL MEDICINE

## 2019-02-04 PROCEDURE — 3044F HG A1C LEVEL LT 7.0%: CPT | Mod: CPTII,S$GLB,, | Performed by: INTERNAL MEDICINE

## 2019-02-04 PROCEDURE — 3044F PR MOST RECENT HEMOGLOBIN A1C LEVEL <7.0%: ICD-10-PCS | Mod: CPTII,S$GLB,, | Performed by: INTERNAL MEDICINE

## 2019-02-04 PROCEDURE — 1101F PT FALLS ASSESS-DOCD LE1/YR: CPT | Mod: CPTII,S$GLB,, | Performed by: INTERNAL MEDICINE

## 2019-02-04 RX ORDER — PANTOPRAZOLE SODIUM 20 MG/1
TABLET, DELAYED RELEASE ORAL
Qty: 30 TABLET | Refills: 3 | Status: SHIPPED | OUTPATIENT
Start: 2019-02-04 | End: 2019-11-07 | Stop reason: SDUPTHER

## 2019-02-04 NOTE — PROGRESS NOTES
Subjective:      Patient ID: Winnie Ngo is a 73 y.o. female.    Chief Complaint: Follow-up    HPI:  HPI   Patient is here in follow up of medical problems., discuss new issues    Diabetes Management Status    Statin: Taking  ACE/ARB: Not taking    Screening or Prevention Patient's value Goal Complete/Controlled?   HgA1C Testing and Control   Lab Results   Component Value Date    HGBA1C 5.7 (H) 07/11/2018    HGBA1C 5.7 (H) 07/11/2018      Annually/Less than 8% Yes   Lipid profile : 07/11/2018 Annually Yes   LDL control Lab Results   Component Value Date    LDLCALC 89.8 07/11/2018    Annually/Less than 100 mg/dl  Yes   Nephropathy screening Lab Results   Component Value Date    LABMICR 19.0 07/11/2018     Lab Results   Component Value Date    PROTEINUA Negative 10/09/2012    Annually Yes   Blood pressure BP Readings from Last 1 Encounters:   02/04/19 119/64    Less than 140/90 Yes   Dilated retinal exam : 03/27/2017 Annually Yes   Foot exam   : 07/11/2018 Annually Yes       Patient Active Problem List   Diagnosis    History of asthma    Hyperlipidemia: without diabetes ASCVD Risk 15.5%   with diabetes 27.9% ( elevated glucoses)    GERD (gastroesophageal reflux disease)    Osteopenia    History of endoscopy    History of colonoscopy    Pulmonary nodules    History of mammogram    Annual physical exam    Kidney stones    Surgery, elective:Hysterectomy oophorectomy     Schatzki's ring    Atrophic vaginitis    History of colon cancer    Colon adenomas    Diarrhea    Nonspecific (abnormal) findings on radiological and other examination of abdominal area, including retroperitoneum    Mild vitamin D deficiency    DJD (degenerative joint disease) of lumbar spine, mild    Chronic LBP    Osteoarthritis of left knee, severe    Right lumbar radiculopathy    Trochanteric bursitis of right hip    Asthma, mild persistent    Elevated LDL cholesterol level    Elevated glucose: checking A1C 6/16/2016     Abnormal gastrointestinal PET scan    DCIS (ductal carcinoma in situ)    DCIS (ductal carcinoma in situ) of breast    Uncontrolled type 2 diabetes mellitus without complication, without long-term current use of insulin     Past Medical History:   Diagnosis Date    Allergy     Anxiety     Asthma     Benign neoplasm of colon     Cancer     colon    Cataract     COPD (chronic obstructive pulmonary disease)     Fever blister     GERD (gastroesophageal reflux disease)     Hyperlipidemia     Joint pain     Osteopenia     Personal history of malignant neoplasm of large intestine      Past Surgical History:   Procedure Laterality Date    APPENDECTOMY      BREAST BIOPSY Right 1980's    exc bx     BREAST BIOPSY Left 2016    lumpectomy    BREAST LUMPECTOMY Left 2016    CHOLECYSTECTOMY      COLON SURGERY      stage T1 N0 M0    COLONOSCOPY N/A 3/25/2013    [facility] at [facility] (4TH FLR)    ESOPHAGOGASTRODUODENOSCOPY (EGD) N/A 6/21/2016    Performed by Misbah Martins MD at [facility] (2ND FLR)    HYSTERECTOMY      KNEE SURGERY      arthroscopic    MASTECTOMY-PARTIAL left re-excision Left 12/2/2016    Per[facility] at [facility] OR 2ND FLR    MASTECTOMY-PARTIAL with wire loc, pt to arrive to [facility] at 0745 for wire placement (1 hr case) Left 10/21/2016    Performed by [facility] at [facility] OR 2ND FLR    NASAL SEPTUM SURGERY      with removal of polyps    sigmoid colectomy      SINUS SURGERY      TOTAL ABDOMINAL HYSTERECTOMY W/ BILATERAL SALPINGOOPHORECTOMY      ULTRASOUND-ENDOSCOPIC-UPPER N/A 6/21/2016    Performed by [facility] at [facility] (2ND FLR)    UPPER ENDOSCOPY W/ BANDING  2004    Schatzki's ring dialated 2 sessile polyps in stomach     Family History   Problem Relation Age of Onset    Alzheimer's disease Mother     Kidney disease Mother         kidney stone    Heart disease Father 41        MI    Stroke Father     Cancer Brother          "leg    Cancer Brother         Kidney cancer    Cancer Maternal Uncle     Melanoma Neg Hx      Review of Systems   Constitutional: Negative for chills, fever and unexpected weight change.   HENT: Negative for trouble swallowing.    Respiratory: Negative for cough, shortness of breath and wheezing.    Cardiovascular: Negative for chest pain and palpitations.   Gastrointestinal: Negative for abdominal distention, abdominal pain, blood in stool and vomiting.   Musculoskeletal: Negative for back pain.     Objective:     Vitals:    02/04/19 0923   BP: 119/64   Pulse: 81   SpO2: 97%   Weight: 70.9 kg (156 lb 4.9 oz)   Height: 5' 2" (1.575 m)   PainSc: 0-No pain     Body mass index is 28.59 kg/m².  Physical Exam   Constitutional: She is oriented to person, place, and time. She appears well-developed and well-nourished. No distress.   Neck: Carotid bruit is not present. No thyromegaly present.   Cardiovascular: Normal rate, regular rhythm and normal heart sounds. PMI is not displaced.   Pulmonary/Chest: Effort normal and breath sounds normal. No respiratory distress.   Abdominal: Soft. Bowel sounds are normal. She exhibits no distension. There is no tenderness.   Musculoskeletal: She exhibits no edema.   Neurological: She is alert and oriented to person, place, and time.     Assessment:     1. Decreased hearing, unspecified laterality    2. Right knee pain, unspecified chronicity    3. Uncontrolled type 2 diabetes mellitus without complication, without long-term current use of insulin    4. History of colon cancer    5. Gastroesophageal reflux disease without esophagitis      Plan:   Winnie was seen today for follow-up.    Diagnoses and all orders for this visit:    Decreased hearing, unspecified laterality  -     Ambulatory consult to ENT  -     Audiogram (air & bone); Future    Right knee pain, unspecified chronicity  -     Ambulatory consult to Physical Medicine Rehab    Uncontrolled type 2 diabetes mellitus without " complication, without long-term current use of insulin  -     Ambulatory consult to Optometry  -     CBC auto differential; Future  -     Comprehensive metabolic panel; Future  -     Hemoglobin A1c; Future    History of colon cancer  -     CEA; Future    Gastroesophageal reflux disease without esophagitis  -     pantoprazole (PROTONIX) 20 MG tablet; TAKE 1 TABLET(20 MG) BY MOUTH EVERY DAY        Problem List Items Addressed This Visit     GERD (gastroesophageal reflux disease)    Relevant Medications    pantoprazole (PROTONIX) 20 MG tablet    History of colon cancer    Relevant Orders    CEA    Uncontrolled type 2 diabetes mellitus without complication, without long-term current use of insulin    Relevant Orders    Ambulatory consult to Optometry    CBC auto differential    Comprehensive metabolic panel    Hemoglobin A1c      Other Visit Diagnoses     Decreased hearing, unspecified laterality    -  Primary    Relevant Orders    Ambulatory consult to ENT    Audiogram (air & bone)    Right knee pain, unspecified chronicity        Relevant Orders    Ambulatory consult to Physical Medicine Rehab        Orders Placed This Encounter   Procedures    CBC auto differential     Standing Status:   Future     Standing Expiration Date:   4/5/2019    Comprehensive metabolic panel     Standing Status:   Future     Standing Expiration Date:   4/5/2019    Hemoglobin A1c     Standing Status:   Future     Standing Expiration Date:   4/5/2019    CEA     Standing Status:   Future     Standing Expiration Date:   4/4/2020    Ambulatory consult to ENT     Referral Priority:   Routine     Referral Type:   Consultation     Referral Reason:   Specialty Services Required     Requested Specialty:   Otolaryngology     Number of Visits Requested:   1    Ambulatory consult to Optometry     Referral Priority:   Routine     Referral Type:   Vision (Optometry)     Referral Reason:   Specialty Services Required     Requested Specialty:    Optometry     Number of Visits Requested:   1    Ambulatory consult to Physical Medicine Rehab     Referral Priority:   Routine     Referral Type:   Rehabilitation     Referral Reason:   Specialty Services Required     Requested Specialty:   Physical Medicine and Rehabilitation     Number of Visits Requested:   1    Audiogram (air & bone)     Standing Status:   Future     Standing Expiration Date:   2/4/2020     Follow-up in about 6 months (around 8/4/2019) for Annual exam.     Medication List           Accurate as of 2/4/19 10:22 AM. If you have any questions, ask your nurse or doctor.               CONTINUE taking these medications    ADVAIR DISKUS 250-50 mcg/dose diskus inhaler  Generic drug:  fluticasone-salmeterol 250-50 mcg/dose  INHALE 1 PUFF INTO THE LUNGS TWICE DAILY     albuterol 90 mcg/actuation inhaler  Commonly known as:  PROVENTIL/VENTOLIN HFA  Inhale 2 puffs into the lungs every 6 (six) hours as needed for Wheezing. May substitute any albuterol inhaler on her plan.     atorvastatin 10 MG tablet  Commonly known as:  LIPITOR  Take 1 tablet (10 mg total) by mouth every evening.     lancets Misc  1 each by Misc.(Non-Drug; Combo Route) route once daily.     LORazepam 1 MG tablet  Commonly known as:  ATIVAN  TAKE 1 TABLET BY MOUTH EVERY EVENING AS NEEDED FOR PANIC ATTACKS     pantoprazole 20 MG tablet  Commonly known as:  PROTONIX  TAKE 1 TABLET(20 MG) BY MOUTH EVERY DAY     TRUE METRIX GLUCOSE TEST STRIP Strp  Generic drug:  blood sugar diagnostic  USE TO MEASURE BLOOD SUGAR DAILY     VITAMIN D3 1,000 unit capsule  Generic drug:  cholecalciferol (vitamin D3)           Where to Get Your Medications      These medications [facility] to [facility] [id number]  [address] [facility] [address]  [address] [address]    Phone:  [phone]   · pantoprazole 20 MG tablet

## 2019-02-05 ENCOUNTER — TELEPHONE (OUTPATIENT)
Dept: INTERNAL MEDICINE | Facility: CLINIC | Age: 74
End: 2019-02-05

## 2019-02-05 DIAGNOSIS — F41.9 ANXIETY: ICD-10-CM

## 2019-02-05 RX ORDER — LORAZEPAM 1 MG/1
TABLET ORAL
Qty: 30 TABLET | Refills: 0 | Status: SHIPPED | OUTPATIENT
Start: 2019-02-05 | End: 2019-02-06 | Stop reason: SDUPTHER

## 2019-02-05 NOTE — TELEPHONE ENCOUNTER
Lorazepam was sent, please ask if she wishes you to cancel the pantoprazole or just leave it on file.

## 2019-02-05 NOTE — TELEPHONE ENCOUNTER
----- Message from Naima Smith sent at 2/5/2019 11:17 AM CST -----  Contact: self/[phone]  Patient is calling in regards needing to talk with someone in the office about PCP was suppose to sent rx LORazepam (ATIVAN) 1 MG tablet not the Protonix. [facility] [employer] [id number] [address] [address] AT [facility] [address] & [phone] (Phone) [phone] (Fax). Please call and advise. Thank you

## 2019-02-06 RX ORDER — LORAZEPAM 1 MG/1
TABLET ORAL
Qty: 30 TABLET | Refills: 0 | Status: SHIPPED | OUTPATIENT
Start: 2019-02-06 | End: 2019-03-18 | Stop reason: SDUPTHER

## 2019-02-06 NOTE — TELEPHONE ENCOUNTER
----- Message from Ilda Forrest sent at 2/6/2019 11:09 AM CST -----  Contact: [phone]  Patient is requesting for her medication LORazepam (ATIVAN) 1 MG tablet to be sent to another pharmacy. The pharmacy that it was sent to ([facility]) does not have it. She would like for her prescription to be sent to [facility] [phone].    Please advise, thanks

## 2019-03-13 ENCOUNTER — OFFICE VISIT (OUTPATIENT)
Dept: OPTOMETRY | Facility: CLINIC | Age: 74
End: 2019-03-13
Payer: MEDICARE

## 2019-03-13 DIAGNOSIS — H04.123 BILATERAL DRY EYES: ICD-10-CM

## 2019-03-13 DIAGNOSIS — E11.9 TYPE 2 DIABETES MELLITUS WITHOUT RETINOPATHY: Primary | ICD-10-CM

## 2019-03-13 DIAGNOSIS — H52.4 HYPEROPIA WITH ASTIGMATISM AND PRESBYOPIA, BILATERAL: ICD-10-CM

## 2019-03-13 DIAGNOSIS — H52.203 HYPEROPIA WITH ASTIGMATISM AND PRESBYOPIA, BILATERAL: ICD-10-CM

## 2019-03-13 DIAGNOSIS — H25.13 NUCLEAR SCLEROTIC CATARACT OF BOTH EYES: ICD-10-CM

## 2019-03-13 DIAGNOSIS — H52.03 HYPEROPIA WITH ASTIGMATISM AND PRESBYOPIA, BILATERAL: ICD-10-CM

## 2019-03-13 PROCEDURE — 92015 DETERMINE REFRACTIVE STATE: CPT | Mod: S$GLB,,, | Performed by: OPTOMETRIST

## 2019-03-13 PROCEDURE — 92015 PR REFRACTION: ICD-10-PCS | Mod: S$GLB,,, | Performed by: OPTOMETRIST

## 2019-03-13 PROCEDURE — 99499 RISK ADDL DX/OHS AUDIT: ICD-10-PCS | Mod: S$GLB,,, | Performed by: OPTOMETRIST

## 2019-03-13 PROCEDURE — 99999 PR PBB SHADOW E&M-EST. PATIENT-LVL II: CPT | Mod: PBBFAC,,, | Performed by: OPTOMETRIST

## 2019-03-13 PROCEDURE — 92004 COMPRE OPH EXAM NEW PT 1/>: CPT | Mod: S$GLB,,, | Performed by: OPTOMETRIST

## 2019-03-13 PROCEDURE — 92004 PR EYE EXAM, NEW PATIENT,COMPREHESV: ICD-10-PCS | Mod: S$GLB,,, | Performed by: OPTOMETRIST

## 2019-03-13 PROCEDURE — 99999 PR PBB SHADOW E&M-EST. PATIENT-LVL II: ICD-10-PCS | Mod: PBBFAC,,, | Performed by: OPTOMETRIST

## 2019-03-13 PROCEDURE — 99499 UNLISTED E&M SERVICE: CPT | Mod: S$GLB,,, | Performed by: OPTOMETRIST

## 2019-03-13 NOTE — PROGRESS NOTES
HPI     Ms. Winnie Ngo was referred by Manuela Merchant MD for a diabetic eye   exam.    Clear distance vision but near blur with glasses. She requests refraction   for PAL.     (-)drops  (-)flashes  (+)floaters: black floater occasionally (not sure which eye)  (-)diplopia    (+)Diabetes  Hemoglobin A1C       Date                     Value               Ref Range             Status           02/04/2019               6.1 (H)             4.0 - 5.6 %         Final  07/11/2018               5.7 (H)             4.0 - 5.6 %         Final  07/11/2018               5.7 (H)             4.0 - 5.6 %         Final    OCULAR HISTORY  Last Eye Exam: 2014 with [facility]  (-)eye surgery   (-)diagnosed or treated for any eye conditions or diseases     FAMILY HISTORY  (-)Glaucoma         Last edited by Clemencia Mehta, OD on 3/13/2019 11:29 AM. (History)            Assessment /Plan     For exam results, see Encounter Report.    Type 2 diabetes mellitus without retinopathy   No retinopathy noted OU. Monitor with yearly DFE.     Bilateral dry eyes   OD>OS. Contributing to reduced best-corrected visual acuity (OD 20/30-, OS 20/25). Recommended artificial tears TID OU.    Nuclear sclerotic cataract of both eyes   Contributing to reduced best-corrected visual acuity, but pt does not yet feel ready for cataract surgery. Monitor yearly.    Hyperopia with astigmatism and presbyopia, bilateral   Small change OU. New glasses prescription released, adaptation expected. Add power increased, pt is fine with reducing viewing distance.   Patient educated that refractive error can change with dry eyes.  Eyeglass Final Rx     Eyeglass Final Rx       Sphere Cylinder Axis Add    Right +3.25 +0.75 170 +2.75    Left +3.00 +0.75 010 +2.75    Type:  PAL    Expiration Date:  3/13/2020                 RTC 1 year

## 2019-03-13 NOTE — LETTER
March 13, 2019      Manuela Merchant MD  [address]  [facility] [address]           [facility]  [address]  [facility] [address]  Phone: [phone]          Patient: Winnie Ngo   MR Number: [id number]   YOB: 1945   Date of Visit: 3/13/2019       Dear Dr. Manuela Merchant:    Thank you for referring Winnie Ngo to me for evaluation. Attached you will find relevant portions of my assessment and plan of care.    If you have questions, please do not hesitate to call me. I look forward to following Winnie Ngo along with you.    Sincerely,    Clemencia Mehta, OD    Enclosure  CC:  No Recipients    If you would like to receive this communication electronically, please contact [email or web address] or [phone] to request more information on [employer] access.    For providers and/or their staff who would like to refer a patient to [facility], please contact us through our one-stop-shop provider referral line, [facility], at [phone].    If you feel you have received this communication in error or would no longer like to receive these types of communications, please e-mail [email or web address]

## 2019-03-13 NOTE — Clinical Note
Dear Dr. Merchant,Thank you for referring Ms. Ngo for a diabetic eye examination; there is no retinopathy and I will continue to monitor yearly. Please let me know if you have questions.Sincerely,Clemencia Mehta OD

## 2019-03-13 NOTE — PATIENT INSTRUCTIONS
"DRY EYES     Dry eyes is a common condition. It can be caused by an insufficient volume of tears, or by tears that do not spread evenly over the cornea. An uneven tear film can also cause vision to blur intermittently.   Dry eyes are often associated with burning, stinging, and/or red eyes.As we age, the eyes usually produce fewer tears and result in decreased normal eye lubrication. Paradoxically, dry eye can make eyes water. When they water, that watery production actually makes the dry eye situation worse because the watery tears do not lubricate the eye well at all. Watery tears really serve to flush foreign material out of the eye, not to lubricate. Unfortunately, when the eyes get really dry they become irritated and stimulate the formation of watery tears.   Lubricants, in the form of drops or ointments, are the principal treatment for dry eyes. The following are recommendations for managing your dry eye condition:    1) Use over-the-counter artificial tears or lubricants (such as Systane Balance, Soothe XP, Refresh Optive, Blink, or Genteal), 1 drop in each eye 3 to 4 times a day. Please avoid drops that advertise redness relief since these can be unhealthy for your eyes and can cause permanent redness if used long-term.     It is best to take artificial tears on a schedule, like you would for a medication. Taking them routinely at breakfast, lunch, and dinner for example will provide better relief and better use of the tear drop than taking them whenever your eyes "feel" dry.    2) Optional use of over-the-counter lubricating gels (such as Genteal Gel, Systane Gel, or Refresh PM) applied to the inside of the lower lid of both eyes at bedtime. This gel is very thick and may cause blurred vision, so we recommend you use it before bedtime. In the morning you can gently wipe your eyes with a damp washcloth to remove any residual gel.    3) Warm compresses applied to the closed eyelids twice per day, 10 minutes " each time.    4) Always drink an adequate amount of water daily (4-6 cups a day).    5) 1000 mg of good quality fish oil (labeled DHA and/or EPA). Flaxseed oil can also be beneficial. Do not take fish oil if you are currently taking a medication called warfarin or coumadin.    6) Use a humidifier in your bedroom.    7) If the dryness continues there may be additional options of punctal plugs, or prescription dry eye drops such as Restasis or Xiidra. Punctal plugs are medical devices inserted into the puncta or the drain of the eye to block some of your natural tears from draining off the eye. Restasis and Xiidra are prescription eye drops that, over time, may help your body make more tears naturally.    It is important to maintain this treatment plan until your symptoms have improved. Once improved, you can reduce the frequency of lubricants and warm compresses. If the symptoms recur, then apply as needed for comfort.    ==============================================    CATARACT    Symptoms and Signs:  A cataract starts out small, and at first has little effect on your vision. You may notice that your vision is blurred a little, like looking through a cloudy piece of glass or viewing an impressionist painting. A cataract may make light from the sun or a lamp seem too bright or glaring. Or you may notice when you drive at night that the oncoming headlights cause more glare than before. Colors may not appear as bright as they once did.  The type of cataract you have will affect exactly which symptoms you experience and how soon they will occur. When a nuclear cataract first develops it can bring about a temporary improvement in your near vision, called second sight. Unfortunately, the improved vision is short-lived and will disappear as the cataract worsens. Meanwhile, a sub-capsular cataract may not produce any symptoms until it's well-developed.    Causes:  No one knows for sure why the eye's lens changes as we age,  forming cataracts. Researchers are gradually identifying factors that may cause cataracts - and information that may help to prevent them.  Many studies suggest that exposure to ultraviolet light is associated with cataracts, so eye care practitioners recommend wearing sunglasses and a wide-brimmed hat to lessen your exposure.  Other studies suggest people with diabetes are at risk for developing a cataract.   Some eye care practitioners believe that a diet high in antioxidants, such as beta-carotene (vitamin A), selenium and vitamins C and E, may forestall cataracts.  The most important of these is probably vitamin C; it might be helpful to supplement the diet with an extra Vitamin C tablet.  Meanwhile, eating a lot of salt may increase your risk.  Other risk factors include cigarette smoke, air pollution and heavy alcohol consumption.  We simply recommend that you be careful to use sunglasses and to take Vitamin C.    Treatment:  When symptoms begin to appear, we can improve your vision for a while using new glasses, strong bifocals, magnification, appropriate lighting or other visual aids.  This is true in your case; your cataract does not impact your vision very much at this time. If you experience any of the symptoms we described you can return at any time. Otherwise it is fine to see you in 1 year.

## 2019-03-18 DIAGNOSIS — F41.9 ANXIETY: ICD-10-CM

## 2019-03-18 RX ORDER — LORAZEPAM 1 MG/1
TABLET ORAL
Qty: 30 TABLET | Refills: 0 | Status: SHIPPED | OUTPATIENT
Start: 2019-03-18 | End: 2019-05-14 | Stop reason: SDUPTHER

## 2019-03-18 NOTE — TELEPHONE ENCOUNTER
"----- Message from Louann Romero sent at 3/18/2019  9:52 AM CDT -----  Contact: Self [phone]  RX request - refill or new RX.  Is this a refill or new RX:  refill  RX name and strength: LORazepam (ATIVAN) 1 MG tablet  Directions:   Is this a 30 day or 90 day RX:  90  Local pharmacy or mail order pharmacy:    Pharmacy name and phone # (DON'T enter "on file" or "in chart"): [facility] [place] [place] - [address] AT [facility] [phone] (Phone)  [phone] (Fax)  Comments:  Pt will a call back         "

## 2019-03-27 RX ORDER — ATORVASTATIN CALCIUM 10 MG/1
TABLET, FILM COATED ORAL
Qty: 90 TABLET | Refills: 0 | Status: SHIPPED | OUTPATIENT
Start: 2019-03-27 | End: 2019-06-27 | Stop reason: SDUPTHER

## 2019-04-02 ENCOUNTER — CLINICAL SUPPORT (OUTPATIENT)
Dept: AUDIOLOGY | Facility: CLINIC | Age: 74
End: 2019-04-02
Payer: MEDICARE

## 2019-04-02 ENCOUNTER — OFFICE VISIT (OUTPATIENT)
Dept: OTOLARYNGOLOGY | Facility: CLINIC | Age: 74
End: 2019-04-02
Payer: MEDICARE

## 2019-04-02 VITALS — BODY MASS INDEX: 29.86 KG/M2 | HEIGHT: 62 IN | WEIGHT: 162.25 LBS

## 2019-04-02 DIAGNOSIS — H91.90 DECREASED HEARING, UNSPECIFIED LATERALITY: ICD-10-CM

## 2019-04-02 DIAGNOSIS — H93.13 TINNITUS AURIUM, BILATERAL: ICD-10-CM

## 2019-04-02 DIAGNOSIS — H91.13 PRESBYCUSIS OF BOTH EARS: ICD-10-CM

## 2019-04-02 DIAGNOSIS — H90.3 SENSORINEURAL HEARING LOSS (SNHL), BILATERAL: Primary | ICD-10-CM

## 2019-04-02 DIAGNOSIS — H93.13 TINNITUS OF BOTH EARS: ICD-10-CM

## 2019-04-02 DIAGNOSIS — H90.3 SENSORINEURAL HEARING LOSS, BILATERAL: Primary | ICD-10-CM

## 2019-04-02 PROCEDURE — 99999 PR PBB SHADOW E&M-EST. PATIENT-LVL II: CPT | Mod: PBBFAC,,, | Performed by: OTOLARYNGOLOGY

## 2019-04-02 PROCEDURE — 92557 COMPREHENSIVE HEARING TEST: CPT | Mod: S$GLB,,, | Performed by: AUDIOLOGIST

## 2019-04-02 PROCEDURE — 1101F PR PT FALLS ASSESS DOC 0-1 FALLS W/OUT INJ PAST YR: ICD-10-PCS | Mod: CPTII,S$GLB,, | Performed by: OTOLARYNGOLOGY

## 2019-04-02 PROCEDURE — 1101F PT FALLS ASSESS-DOCD LE1/YR: CPT | Mod: CPTII,S$GLB,, | Performed by: OTOLARYNGOLOGY

## 2019-04-02 PROCEDURE — 99203 OFFICE O/P NEW LOW 30 MIN: CPT | Mod: S$GLB,,, | Performed by: OTOLARYNGOLOGY

## 2019-04-02 PROCEDURE — 99999 PR PBB SHADOW E&M-EST. PATIENT-LVL II: ICD-10-PCS | Mod: PBBFAC,,, | Performed by: OTOLARYNGOLOGY

## 2019-04-02 PROCEDURE — 99203 PR OFFICE/OUTPT VISIT, NEW, LEVL III, 30-44 MIN: ICD-10-PCS | Mod: S$GLB,,, | Performed by: OTOLARYNGOLOGY

## 2019-04-02 PROCEDURE — 92557 PR COMPREHENSIVE HEARING TEST: ICD-10-PCS | Mod: S$GLB,,, | Performed by: AUDIOLOGIST

## 2019-04-02 NOTE — PROGRESS NOTES
Subjective:       Patient ID: Winnie Ngo is a 74 y.o. female.    Chief Complaint: Hearing Loss and Tinnitus    Hearing Loss:   Chronicity:  Chronic  Onset:  More than 1 year ago  Progression since onset:  Gradually worsening  Severity:  Mild  Hearing loss characteristics:  Impaired select discrimination   Associated symptoms: tinnitus.  No fever and no headaches.  Treatments tried:  Nothing  Ringing in Ears:    Associated symptoms: tinnitus.  No fever and no headaches.    Review of Systems   Constitutional: Negative for chills, fever and unexpected weight change.   HENT: Positive for hearing loss and tinnitus. Negative for sore throat and trouble swallowing.    Eyes: Negative for pain and visual disturbance.   Respiratory: Negative for apnea and shortness of breath.    Cardiovascular: Negative for chest pain and palpitations.   Gastrointestinal: Negative for abdominal pain and nausea.   Endocrine: Negative for cold intolerance and heat intolerance.   Musculoskeletal: Negative for joint swelling and neck stiffness.   Skin: Negative for color change and rash.   Neurological: Negative for facial asymmetry and headaches.   Hematological: Negative for adenopathy. Does not bruise/bleed easily.   Psychiatric/Behavioral: Negative for agitation. The patient is not nervous/anxious.        Objective:      Physical Exam   Constitutional: She is oriented to person, place, and time. She appears well-developed and well-nourished. No distress.   HENT:   Head: Normocephalic and atraumatic.   Right Ear: Tympanic membrane, external ear and ear canal normal.   Left Ear: Tympanic membrane, external ear and ear canal normal.   Nose: Nose normal.   Mouth/Throat: Uvula is midline, oropharynx is clear and moist and mucous membranes are normal.   Mixon: Midline  Rinne: AC > BC @ 512Hz   Eyes: Pupils are equal, round, and reactive to light. Conjunctivae and EOM are normal.   Neck: Normal range of motion. No tracheal deviation present. No  thyromegaly present.   Cardiovascular: Normal rate and regular rhythm.   Pulmonary/Chest: Effort normal. No respiratory distress.   Musculoskeletal: Normal range of motion.   Lymphadenopathy:        Head (right side): No submental, no submandibular and no tonsillar adenopathy present.        Head (left side): No submental, no submandibular and no tonsillar adenopathy present.     She has no cervical adenopathy.   Neurological: She is alert and oriented to person, place, and time.   Psychiatric: She has a normal mood and affect. Her behavior is normal.   Nursing note and vitals reviewed.      Data:      Audiogram tracings independently reviewed and discussed with patient shows mild- severe SNHL with good WRS. SRT 20 AU    Assessment:       1. Sensorineural hearing loss, bilateral    2. Presbycusis of both ears    3. Tinnitus aurium, bilateral        Plan:     -HEARING LOSS-  I spent a considerable amount of time educating the patient on hearing and hearing loss.  We discussed the basic characteristics of conductive hearing loss versus sensorineural hearing loss and the significant differences in treatment options between the two categories.      We discussed that in cases of conductive hearing loss, this suggests a mechanical disorder that sometimes can be improved with medications &/or surgery.  It may occur secondary to external ear pathology (atresia, otitis externa, etc), tympanic membrane disorders (large perforations, immobility due to scarring or eustachian tube dysfunction), and middle ear disorders (effusions, ossicular disorders).    Sensorineural hearing loss is the expected hearing loss pattern with aging, but some disorders such as Meniere's may accelerate this process.  Additionally, amplification with hearing aids is generally the best option for hearing rehabilitation, except where the hearing loss is profound.  We discussed that this generally does not represent a dangerous condition, but in cases  where there is a large discrepancy between the two ears in terms of nerve function, more investigation is often necessary due to the possiblity of vestibular schwannomas or meningiomas at the cerebellopontine angle.  The definitive test for this is an MRI with gadolinium.  However, these masses are usually very slow growing (1-2mm/year), so patients may elect to repeat an audiogram in about 6 months or obtain an ABR so long as they understand that this may result in a delay in diagnosis (although very unlikely that this would have a significant clinical impact on their outcome due to the slow growing nature of these masses) with the understanding that if ABR is abnormal or asymmetry increases, an MRI would then be required.    Winnie  presents with what appears to be sensorineural hearing loss.  Based on this, my recommendation is hearing aid evaluation

## 2019-04-02 NOTE — PROGRESS NOTES
4/2/2019    AUDIOLOGICAL EVALUATION:    Winnie Ngo was seen for an audiological evaluation on 4/2/2019 for decreased hearing sensitivity bilaterally.  Mrs. Ngo reported difficulty hearing conversation and tinnitus bilaterally.    Pure tone threshold testing revealed a high frequency sensorineural hearing loss bilaterally.  Speech reception thresholds were obtained at 20dBHL for the right ear and 20dBHL for the left ear.  Speech discrimination scores were obtained at 88% for the right ear and 92% for the left ear.    Recommend:  1.  Otologic evaluation.  2.  Hearing aid evaluation.  3.  Annual evaluation.

## 2019-04-15 ENCOUNTER — PES CALL (OUTPATIENT)
Dept: ADMINISTRATIVE | Facility: CLINIC | Age: 74
End: 2019-04-15

## 2019-05-14 DIAGNOSIS — F41.9 ANXIETY: ICD-10-CM

## 2019-05-14 NOTE — TELEPHONE ENCOUNTER
Please advise. Pt is requesting the penned medications. Once a response is received I will inform the Pt of your decision.     Vidal

## 2019-05-15 RX ORDER — LORAZEPAM 1 MG/1
TABLET ORAL
Qty: 30 TABLET | Refills: 3 | Status: SHIPPED | OUTPATIENT
Start: 2019-05-15 | End: 2019-05-20 | Stop reason: SDUPTHER

## 2019-05-15 NOTE — TELEPHONE ENCOUNTER
----- Message from Ana Do sent at 5/15/2019  2:58 PM CDT -----  Contact: [phone]  Patient is requesting a call from the office regarding her refill medication LORazepam (ATIVAN) 1 MG tablet.  Patient would like for it to go to the:   [facility] - [place], [place] - [address] AT Portal to Registered [facility] Sites    Patient stated it is cheaper for her to get.    Please advise, thank you

## 2019-05-16 NOTE — TELEPHONE ENCOUNTER
Please cancel the previous prescription with [facility] and I will send the new one when this is done. GML

## 2019-05-17 ENCOUNTER — TELEPHONE (OUTPATIENT)
Dept: INTERNAL MEDICINE | Facility: CLINIC | Age: 74
End: 2019-05-17

## 2019-05-17 NOTE — TELEPHONE ENCOUNTER
----- Message from Louann Romero sent at 5/17/2019  3:19 PM CDT -----  Contact: Self [phone]  Patient is calling for an RX refill or new RX.  Is this a refill or new RX:  New  RX name and strength: LORazepam (ATIVAN) 1 MG tablet  Directions (copy/paste from chart):    Is this a 30 day or 90 day RX:  90 day   Local pharmacy or mail order pharmacy:    Pharmacy name and phone # (copy/paste from chart): [facility] - [place], [place] - [address] AT Portal to Registered [facility] Sites [phone] (Phone)  [phone] (Fax)    Comments:

## 2019-05-20 ENCOUNTER — TELEPHONE (OUTPATIENT)
Dept: INTERNAL MEDICINE | Facility: CLINIC | Age: 74
End: 2019-05-20

## 2019-05-20 DIAGNOSIS — F41.9 ANXIETY: ICD-10-CM

## 2019-05-20 RX ORDER — LORAZEPAM 1 MG/1
TABLET ORAL
Qty: 90 TABLET | Refills: 0 | Status: SHIPPED | OUTPATIENT
Start: 2019-05-20 | End: 2019-10-07 | Stop reason: SDUPTHER

## 2019-05-20 NOTE — TELEPHONE ENCOUNTER
----- Message from Naima Smith sent at 5/20/2019  1:14 PM CDT -----  Contact: self/[phone]  Patient stated that rx LORazepam (ATIVAN) 1 MG tablet  needs to be gibbons from 30 to 90 days supplies. Please call and advise. Thank you

## 2019-06-27 RX ORDER — ATORVASTATIN CALCIUM 10 MG/1
TABLET, FILM COATED ORAL
Qty: 90 TABLET | Refills: 3 | Status: SHIPPED | OUTPATIENT
Start: 2019-06-27 | End: 2020-07-08

## 2019-10-07 DIAGNOSIS — F41.9 ANXIETY: ICD-10-CM

## 2019-10-07 RX ORDER — LORAZEPAM 1 MG/1
TABLET ORAL
Qty: 90 TABLET | Refills: 0 | Status: SHIPPED | OUTPATIENT
Start: 2019-10-07 | End: 2020-02-11 | Stop reason: SDUPTHER

## 2019-10-10 ENCOUNTER — TELEPHONE (OUTPATIENT)
Dept: ADMINISTRATIVE | Facility: HOSPITAL | Age: 74
End: 2019-10-10

## 2019-10-10 ENCOUNTER — PATIENT OUTREACH (OUTPATIENT)
Dept: ADMINISTRATIVE | Facility: HOSPITAL | Age: 74
End: 2019-10-10

## 2019-10-10 DIAGNOSIS — E78.00 PURE HYPERCHOLESTEROLEMIA: ICD-10-CM

## 2019-10-10 DIAGNOSIS — Z00.00 ANNUAL PHYSICAL EXAM: ICD-10-CM

## 2019-10-10 DIAGNOSIS — M85.80 OSTEOPENIA, UNSPECIFIED LOCATION: ICD-10-CM

## 2019-10-10 DIAGNOSIS — E55.9 MILD VITAMIN D DEFICIENCY: ICD-10-CM

## 2019-10-10 DIAGNOSIS — E78.00 ELEVATED LDL CHOLESTEROL LEVEL: Primary | ICD-10-CM

## 2019-10-10 NOTE — PROGRESS NOTES
Fasting Lab scheduled. Pt to discuss Foot Exam with PCP, She was made aware that she'd need to give a  Urine specimen and she has done her MMG. I will obtain her report.

## 2019-10-14 ENCOUNTER — LAB VISIT (OUTPATIENT)
Dept: LAB | Facility: HOSPITAL | Age: 74
End: 2019-10-14
Attending: INTERNAL MEDICINE
Payer: MEDICARE

## 2019-10-14 DIAGNOSIS — E78.00 ELEVATED LDL CHOLESTEROL LEVEL: ICD-10-CM

## 2019-10-14 DIAGNOSIS — E11.9 TYPE 2 DIABETES MELLITUS WITHOUT COMPLICATION: ICD-10-CM

## 2019-10-14 DIAGNOSIS — M85.80 OSTEOPENIA, UNSPECIFIED LOCATION: ICD-10-CM

## 2019-10-14 DIAGNOSIS — Z00.00 ANNUAL PHYSICAL EXAM: ICD-10-CM

## 2019-10-14 DIAGNOSIS — E78.00 PURE HYPERCHOLESTEROLEMIA: ICD-10-CM

## 2019-10-14 LAB
ALBUMIN SERPL BCP-MCNC: 3.9 G/DL (ref 3.5–5.2)
ALP SERPL-CCNC: 75 U/L (ref 55–135)
ALT SERPL W/O P-5'-P-CCNC: 16 U/L (ref 10–44)
ANION GAP SERPL CALC-SCNC: 10 MMOL/L (ref 8–16)
AST SERPL-CCNC: 17 U/L (ref 10–40)
BILIRUB SERPL-MCNC: 1.3 MG/DL (ref 0.1–1)
BUN SERPL-MCNC: 10 MG/DL (ref 8–23)
CALCIUM SERPL-MCNC: 9.7 MG/DL (ref 8.7–10.5)
CHLORIDE SERPL-SCNC: 107 MMOL/L (ref 95–110)
CHOLEST SERPL-MCNC: 172 MG/DL (ref 120–199)
CHOLEST/HDLC SERPL: 3.7 {RATIO} (ref 2–5)
CO2 SERPL-SCNC: 26 MMOL/L (ref 23–29)
CREAT SERPL-MCNC: 0.8 MG/DL (ref 0.5–1.4)
EST. GFR  (AFRICAN AMERICAN): >60 ML/MIN/1.73 M^2
EST. GFR  (NON AFRICAN AMERICAN): >60 ML/MIN/1.73 M^2
ESTIMATED AVG GLUCOSE: 143 MG/DL (ref 68–131)
GLUCOSE SERPL-MCNC: 152 MG/DL (ref 70–110)
HBA1C MFR BLD HPLC: 6.6 % (ref 4–5.6)
HDLC SERPL-MCNC: 47 MG/DL (ref 40–75)
HDLC SERPL: 27.3 % (ref 20–50)
LDLC SERPL CALC-MCNC: 95.2 MG/DL (ref 63–159)
NONHDLC SERPL-MCNC: 125 MG/DL
POTASSIUM SERPL-SCNC: 3.8 MMOL/L (ref 3.5–5.1)
PROT SERPL-MCNC: 7 G/DL (ref 6–8.4)
SODIUM SERPL-SCNC: 143 MMOL/L (ref 136–145)
TRIGL SERPL-MCNC: 149 MG/DL (ref 30–150)
TSH SERPL DL<=0.005 MIU/L-ACNC: 2.55 UIU/ML (ref 0.4–4)

## 2019-10-14 PROCEDURE — 83036 HEMOGLOBIN GLYCOSYLATED A1C: CPT

## 2019-10-14 PROCEDURE — 80061 LIPID PANEL: CPT

## 2019-10-14 PROCEDURE — 84443 ASSAY THYROID STIM HORMONE: CPT

## 2019-10-14 PROCEDURE — 36415 COLL VENOUS BLD VENIPUNCTURE: CPT | Mod: PO

## 2019-10-14 PROCEDURE — 80053 COMPREHEN METABOLIC PANEL: CPT

## 2019-10-17 RX ORDER — ALBUTEROL SULFATE 90 UG/1
2 AEROSOL, METERED RESPIRATORY (INHALATION) EVERY 6 HOURS PRN
Qty: 1 INHALER | Refills: 12 | Status: SHIPPED | OUTPATIENT
Start: 2019-10-17 | End: 2021-08-16 | Stop reason: SDUPTHER

## 2019-10-17 NOTE — TELEPHONE ENCOUNTER
----- Message from oLuann Romero sent at 10/17/2019 11:20 AM CDT -----  Contact: Self [phone]  Patient is calling for an RX refill or new RX.  Is this a refill or new RX:  New   RX name and strength: albuterol (PROVENTIL HFA/VENTOLIN HFA) 90 mcg/actuation HFAA  Directions (copy/paste from chart):    Is this a 30 day or 90 day RX:    Local pharmacy or mail order pharmacy:  local  Pharmacy name and phone # (copy/paste from chart):   [id number] - [place] [address] AT [facility] [address] [phone] (Phone)  [phone] (Fax)  Comments:

## 2019-10-21 ENCOUNTER — IMMUNIZATION (OUTPATIENT)
Dept: PHARMACY | Facility: CLINIC | Age: 74
End: 2019-10-21
Payer: MEDICARE

## 2019-10-21 ENCOUNTER — OFFICE VISIT (OUTPATIENT)
Dept: INTERNAL MEDICINE | Facility: CLINIC | Age: 74
End: 2019-10-21
Payer: MEDICARE

## 2019-10-21 VITALS
HEART RATE: 76 BPM | OXYGEN SATURATION: 96 % | WEIGHT: 165.56 LBS | SYSTOLIC BLOOD PRESSURE: 128 MMHG | BODY MASS INDEX: 30.47 KG/M2 | HEIGHT: 62 IN | DIASTOLIC BLOOD PRESSURE: 78 MMHG

## 2019-10-21 DIAGNOSIS — D05.12 DUCTAL CARCINOMA IN SITU (DCIS) OF LEFT BREAST: ICD-10-CM

## 2019-10-21 DIAGNOSIS — J45.30 MILD PERSISTENT ASTHMA WITHOUT COMPLICATION: ICD-10-CM

## 2019-10-21 DIAGNOSIS — Z85.3 PERSONAL HISTORY OF BREAST CANCER: Primary | ICD-10-CM

## 2019-10-21 DIAGNOSIS — Z23 IMMUNIZATION DUE: ICD-10-CM

## 2019-10-21 DIAGNOSIS — Z85.038 HISTORY OF COLON CANCER: ICD-10-CM

## 2019-10-21 DIAGNOSIS — E11.9 ENCOUNTER FOR DIABETIC FOOT EXAM: ICD-10-CM

## 2019-10-21 DIAGNOSIS — E78.5 HYPERLIPIDEMIA, UNSPECIFIED HYPERLIPIDEMIA TYPE: ICD-10-CM

## 2019-10-21 PROCEDURE — 99499 RISK ADDL DX/OHS AUDIT: ICD-10-PCS | Mod: S$GLB,,, | Performed by: INTERNAL MEDICINE

## 2019-10-21 PROCEDURE — 1101F PR PT FALLS ASSESS DOC 0-1 FALLS W/OUT INJ PAST YR: ICD-10-PCS | Mod: CPTII,S$GLB,, | Performed by: INTERNAL MEDICINE

## 2019-10-21 PROCEDURE — 99214 OFFICE O/P EST MOD 30 MIN: CPT | Mod: S$GLB,,, | Performed by: INTERNAL MEDICINE

## 2019-10-21 PROCEDURE — 99999 PR PBB SHADOW E&M-EST. PATIENT-LVL IV: CPT | Mod: PBBFAC,,, | Performed by: INTERNAL MEDICINE

## 2019-10-21 PROCEDURE — 99999 PR PBB SHADOW E&M-EST. PATIENT-LVL IV: ICD-10-PCS | Mod: PBBFAC,,, | Performed by: INTERNAL MEDICINE

## 2019-10-21 PROCEDURE — 99214 PR OFFICE/OUTPT VISIT, EST, LEVL IV, 30-39 MIN: ICD-10-PCS | Mod: S$GLB,,, | Performed by: INTERNAL MEDICINE

## 2019-10-21 PROCEDURE — 99499 UNLISTED E&M SERVICE: CPT | Mod: S$GLB,,, | Performed by: INTERNAL MEDICINE

## 2019-10-21 PROCEDURE — 1101F PT FALLS ASSESS-DOCD LE1/YR: CPT | Mod: CPTII,S$GLB,, | Performed by: INTERNAL MEDICINE

## 2019-10-21 RX ORDER — FLUTICASONE PROPIONATE AND SALMETEROL 100; 50 UG/1; UG/1
1 POWDER RESPIRATORY (INHALATION) 2 TIMES DAILY
Qty: 60 EACH | Refills: 12 | Status: SHIPPED | OUTPATIENT
Start: 2019-10-21 | End: 2020-08-19 | Stop reason: SDUPTHER

## 2019-10-21 NOTE — PROGRESS NOTES
Subjective:      Patient ID: Winnie Ngo is a 74 y.o. female.    Chief Complaint: Follow-up    HPI:  HPI   Patient is here for follow up of medical problems to include diabetes, history of breast and colon surgery.    Diabetes Management Status    Statin: Taking  ACE/ARB: Not taking    Screening or Prevention Patient's value Goal Complete/Controlled?   HgA1C Testing and Control   Lab Results   Component Value Date    HGBA1C 6.6 (H) 10/14/2019      Annually/Less than 8% Yes   Lipid profile : 10/14/2019 Annually Yes   LDL control Lab Results   Component Value Date    LDLCALC 95.2 10/14/2019    Annually/Less than 100 mg/dl  Yes   Nephropathy screening Lab Results   Component Value Date    LABMICR 19.0 07/11/2018     Lab Results   Component Value Date    PROTEINUA Negative 10/09/2012    Annually No   Blood pressure BP Readings from Last 1 Encounters:   10/21/19 128/78    Less than 140/90 Yes   Dilated retinal exam : 03/13/2019 Annually Yes   Foot exam   : 10/21/2019 Annually Yes         Will need follow up of mammo and CEA, patient requests cologuard.    Review  10/21/2019  Colonoscopy 2013 poor prep : colonoscopy: case request completed ( patient wishes to postpone this 7/11/2018) Dr. Rolle. 10/2019 : patient requested Cologuard states she cannot do a colonoscopy.  Endoscopy 6/28/12: gastric polyps schatzki ring   EUS done in 2016  Mammogram : Diagnostic Mammo at [facility] personal history of breast cancer  Gyn:complete hysterectomy and oophorectomy  Optho:Cataracts : following with optometry, declines appt  Flu: : due in the fall  Tetanus:?  Shingles:did not have chicken pox  Pneumovax: done 8/11/2008 second   Bone density     Consultants:  Dr. Chisholm  Patient Active Problem List   Diagnosis    Hyperlipidemia: without diabetes ASCVD Risk 15.5%   with diabetes 27.9% ( elevated glucoses)    GERD (gastroesophageal reflux disease)    Osteopenia    History of endoscopy    Pulmonary nodules    Annual physical exam     Kidney stones    Surgery, elective:Hysterectomy oophorectomy     Schatzki's ring    Atrophic vaginitis    History of colon cancer    Colon adenomas    Diarrhea    Nonspecific (abnormal) findings on radiological and other examination of abdominal area, including retroperitoneum    Mild vitamin D deficiency    DJD (degenerative joint disease) of lumbar spine, mild    Chronic LBP    Osteoarthritis of left knee, severe    Right lumbar radiculopathy    Trochanteric bursitis of right hip    Asthma, mild persistent    Elevated LDL cholesterol level    Elevated glucose: checking A1C 6/16/2016    Abnormal gastrointestinal PET scan    DCIS (ductal carcinoma in situ)    DCIS (ductal carcinoma in situ) of breast    Uncontrolled type 2 diabetes mellitus without complication, without long-term current use of insulin    Bilateral dry eyes    Nuclear sclerotic cataract of both eyes     Past Medical History:   Diagnosis Date    Allergy     Anxiety     Asthma     Benign neoplasm of colon     Cancer     colon    Cataract     COPD (chronic obstructive pulmonary disease)     Fever blister     GERD (gastroesophageal reflux disease)     Hyperlipidemia     Joint pain     Osteopenia     Personal history of malignant neoplasm of large intestine      Past Surgical History:   Procedure Laterality Date    APPENDECTOMY      BREAST BIOPSY Right 1980's    exc bx     BREAST BIOPSY Left 2016    lumpectomy    BREAST LUMPECTOMY Left 2016    CHOLECYSTECTOMY      COLON SURGERY      stage T1 N0 M0    HYSTERECTOMY      KNEE SURGERY      arthroscopic    NASAL SEPTUM SURGERY      with removal of polyps    sigmoid colectomy      SINUS SURGERY      TOTAL ABDOMINAL HYSTERECTOMY W/ BILATERAL SALPINGOOPHORECTOMY      UPPER ENDOSCOPY W/ BANDING  2004    Schatzki's ring dialated 2 sessile polyps in stomach     Family History   Problem Relation Age of Onset    Alzheimer's disease Mother     Kidney disease Mother  "        kidney stone    Heart disease Father 41        MI    Stroke Father     Cancer Brother         leg    Cancer Brother         Kidney cancer    Cancer Maternal Uncle     Melanoma Neg Hx      Review of Systems   Constitutional: Negative for chills, fever and unexpected weight change.   HENT: Negative for trouble swallowing.    Respiratory: Negative for cough, shortness of breath and wheezing.    Cardiovascular: Negative for chest pain and palpitations.   Gastrointestinal: Negative for abdominal distention, abdominal pain, blood in stool and vomiting.   Musculoskeletal: Negative for back pain.     Objective:     Vitals:    10/21/19 0931   BP: 128/78   Pulse: 76   SpO2: 96%   Weight: 75.1 kg (165 lb 9.1 oz)   Height: 5' 2" (1.575 m)   PainSc:   2   PainLoc: Knee     Body mass index is 30.28 kg/m².  Physical Exam   Constitutional: She is oriented to person, place, and time. She appears well-developed and well-nourished. No distress.   Neck: Carotid bruit is not present. No thyromegaly present.   Cardiovascular: Normal rate, regular rhythm and normal heart sounds. PMI is not displaced.   Pulses:       Dorsalis pedis pulses are 2+ on the right side, and 2+ on the left side.        Posterior tibial pulses are 2+ on the right side, and 2+ on the left side.   Pulmonary/Chest: Effort normal and breath sounds normal. No respiratory distress.   Abdominal: Soft. Bowel sounds are normal. She exhibits no distension. There is no tenderness.   Musculoskeletal: She exhibits no edema.        Right foot: There is normal range of motion and no deformity.        Left foot: There is normal range of motion and no deformity.   Feet:   Right Foot:   Protective Sensation: 5 sites tested. 5 sites sensed.   Skin Integrity: Negative for ulcer, blister, skin breakdown, erythema, warmth, callus or dry skin.   Left Foot:   Protective Sensation: 5 sites tested. 5 sites sensed.   Skin Integrity: Negative for ulcer, blister, skin breakdown, " erythema, warmth, callus or dry skin.   Neurological: She is alert and oriented to person, place, and time.     Assessment:     1. Personal history of breast cancer    2. Immunization due    3. Mild persistent asthma without complication    4. Ductal carcinoma in situ (DCIS) of left breast    5. History of colon cancer    6. Hyperlipidemia, unspecified hyperlipidemia type    7. Uncontrolled type 2 diabetes mellitus without complication, without long-term current use of insulin    8. Encounter for diabetic foot exam      Plan:   Winnie was seen today for follow-up.    Diagnoses and all orders for this visit:    Personal history of breast cancer  Comments:  Diagnostic mammo ordered  Orders:  -     Mammo Digital Diagnostic Bilat w/ Duc; Future    Immunization due  Comments:  Flu shot  Tdap    Mild persistent asthma without complication  Comments:  Reduce the Advair at the request of the patient, now on 250-50  Orders:  -     fluticasone-salmeterol diskus inhaler 100-50 mcg; Inhale 1 puff into the lungs 2 (two) times daily. Controller    Ductal carcinoma in situ (DCIS) of left breast  Comments:  Mammo ordered    History of colon cancer  Comments:  Cologuard  Orders:  -     CEA; Future    Hyperlipidemia, unspecified hyperlipidemia type  Comments:  Continue statin  Orders:  -     CBC auto differential; Future  -     Comprehensive metabolic panel; Future  -     Lipid panel; Future    Uncontrolled type 2 diabetes mellitus without complication, without long-term current use of insulin  Comments:  A1C, 6.6 and will recheck every 6 months  Orders:  -     Hemoglobin A1c; Future    Encounter for diabetic foot exam  Comments:  Negative        Problem List Items Addressed This Visit     Hyperlipidemia: without diabetes ASCVD Risk 15.5%   with diabetes 27.9% ( elevated glucoses)    Relevant Orders    CBC auto differential    Comprehensive metabolic panel    Lipid panel    History of colon cancer    Relevant Orders    CEA    Asthma,  mild persistent    Relevant Medications    fluticasone-salmeterol diskus inhaler 100-50 mcg    DCIS (ductal carcinoma in situ)    Uncontrolled type 2 diabetes mellitus without complication, without long-term current use of insulin    Relevant Orders    Hemoglobin A1c      Other Visit Diagnoses     Personal history of breast cancer    -  Primary    Diagnostic mammo ordered    Relevant Orders    Mammo Digital Diagnostic Bilat w/ Duc    Immunization due        Flu shot  Tdap    Encounter for diabetic foot exam        Negative        Orders Placed This Encounter   Procedures    Mammo Digital Diagnostic Bilat w/ Duc     Standing Status:   Future     Standing Expiration Date:   12/21/2020     Order Specific Question:   May the Radiologist modify the order per protocol to meet the clinical needs of the patient?     Answer:   Yes    CEA     Standing Status:   Future     Standing Expiration Date:   12/21/2020    CBC auto differential     Standing Status:   Future     Standing Expiration Date:   12/21/2020    Comprehensive metabolic panel     Standing Status:   Future     Standing Expiration Date:   12/21/2020    Hemoglobin A1c     Standing Status:   Future     Standing Expiration Date:   12/21/2020    Lipid panel     Standing Status:   Future     Standing Expiration Date:   12/21/2020     Follow up in about 6 months (around 4/21/2020) for FU with CEA, cbc, cmp, lipid A1C.     Medication List           Accurate as of October 21, 2019 10:19 AM. If you have any questions, ask your nurse or doctor.               START taking these medications    fluticasone-salmeterol 100-50 mcg/dose 100-50 mcg/dose diskus inhaler  Commonly known as:  ADVAIR  Inhale 1 puff into the lungs 2 (two) times daily. Controller  Replaces:  ADVAIR DISKUS 250-50 mcg/dose diskus inhaler  Started by:  Manuela Merchant MD        CONTINUE taking these medications    albuterol 90 mcg/actuation inhaler  Commonly known as:  PROVENTIL/VENTOLIN HFA  Inhale 2  puffs into the lungs every 6 (six) hours as needed for Wheezing. May substitute any albuterol inhaler on her plan.     atorvastatin 10 MG tablet  Commonly known as:  LIPITOR  TAKE 1 TABLET(10 MG) BY MOUTH EVERY EVENING     lancets Misc  1 each by Misc.(Non-Drug; Combo Route) route once daily.     LORazepam 1 MG tablet  Commonly known as:  ATIVAN  TAKE 1 TABLET EVERY EVENINGAS NEEDED FOR PANIC ATTACKS     pantoprazole 20 MG tablet  Commonly known as:  PROTONIX  TAKE 1 TABLET(20 MG) BY MOUTH EVERY DAY     TRUE METRIX GLUCOSE TEST STRIP Strp  Generic drug:  blood sugar diagnostic  USE TO MEASURE BLOOD SUGAR DAILY     VITAMIN D3 1,000 unit capsule  Generic drug:  cholecalciferol (vitamin D3)        STOP taking these medications    ADVAIR DISKUS 250-50 mcg/dose diskus inhaler  Generic drug:  fluticasone-salmeterol 250-50 mcg/dose  Replaced by:  fluticasone-salmeterol 100-50 mcg/dose 100-50 mcg/dose diskus inhaler  Stopped by:  Manuela Merchant MD           Where to Get Your Medications      These medications were sent to [id number] - [place] - [address]  [address] [address]    Phone:  [phone]   · fluticasone-salmeterol 100-50 mcg/dose 100-50 mcg/dose diskus inhaler

## 2019-11-07 DIAGNOSIS — K21.9 GASTROESOPHAGEAL REFLUX DISEASE WITHOUT ESOPHAGITIS: ICD-10-CM

## 2019-11-07 RX ORDER — PANTOPRAZOLE SODIUM 20 MG/1
TABLET, DELAYED RELEASE ORAL
Qty: 90 TABLET | Refills: 3 | Status: SHIPPED | OUTPATIENT
Start: 2019-11-07 | End: 2020-11-11

## 2019-11-07 NOTE — TELEPHONE ENCOUNTER
----- Message from Haley Adorno sent at 11/7/2019  1:45 PM CST -----  Contact: pt [phone]  Patient is calling for an RX refill or new RX.  Is this a refill or new RX:  New   RX name and strength: pantoprazole (PROTONIX) 20 MG tablet  Directions (copy/paste from chart):    Is this a 30 day or 90 day RX:  90  Local pharmacy or mail order pharmacy:  Local   Pharmacy name and phone # (copy/paste from chart): [facility][id number] - [place] - [facility] AT [facility] [address] [phone] (Phone)  [phone] (Fax)  Comments:  Pt out of medication

## 2019-12-10 ENCOUNTER — TELEPHONE (OUTPATIENT)
Dept: RADIOLOGY | Facility: HOSPITAL | Age: 74
End: 2019-12-10

## 2019-12-10 ENCOUNTER — HOSPITAL ENCOUNTER (OUTPATIENT)
Dept: RADIOLOGY | Facility: HOSPITAL | Age: 74
Discharge: HOME | End: 2019-12-10
Attending: INTERNAL MEDICINE
Payer: MEDICARE

## 2019-12-10 DIAGNOSIS — Z85.3 PERSONAL HISTORY OF BREAST CANCER: ICD-10-CM

## 2019-12-10 PROCEDURE — 77062 MAMMO DIGITAL DIAGNOSTIC BILAT WITH TOMOSYNTHESIS_CAD: ICD-10-PCS | Mod: 26,,, | Performed by: RADIOLOGY

## 2019-12-10 PROCEDURE — 77066 MAMMO DIGITAL DIAGNOSTIC BILAT WITH TOMOSYNTHESIS_CAD: ICD-10-PCS | Mod: 26,,, | Performed by: RADIOLOGY

## 2019-12-10 PROCEDURE — 77062 BREAST TOMOSYNTHESIS BI: CPT | Mod: TC,PO

## 2019-12-10 PROCEDURE — 77066 DX MAMMO INCL CAD BI: CPT | Mod: 26,,, | Performed by: RADIOLOGY

## 2019-12-10 PROCEDURE — 77062 BREAST TOMOSYNTHESIS BI: CPT | Mod: 26,,, | Performed by: RADIOLOGY

## 2020-02-11 DIAGNOSIS — F41.9 ANXIETY: ICD-10-CM

## 2020-02-11 RX ORDER — LORAZEPAM 1 MG/1
TABLET ORAL
Qty: 90 TABLET | Refills: 0 | Status: CANCELLED | OUTPATIENT
Start: 2020-02-11

## 2020-02-11 RX ORDER — LORAZEPAM 1 MG/1
TABLET ORAL
Qty: 90 TABLET | Refills: 0 | Status: SHIPPED | OUTPATIENT
Start: 2020-02-11 | End: 2020-06-08 | Stop reason: SDUPTHER

## 2020-02-11 NOTE — TELEPHONE ENCOUNTER
----- Message from Ivy Ortega sent at 2/11/2020  1:24 PM CST -----  Contact: Patient [phone]  Prescription Request:     Name of medication: LORazepam (ATIVAN) 1 MG tablet    Reason for request: Refill    Pharmacy: [facility] [address] [place] [address]    Please advise.    Thank You

## 2020-02-24 ENCOUNTER — PES CALL (OUTPATIENT)
Dept: ADMINISTRATIVE | Facility: CLINIC | Age: 75
End: 2020-02-24

## 2020-08-17 ENCOUNTER — LAB VISIT (OUTPATIENT)
Dept: LAB | Facility: HOSPITAL | Age: 75
End: 2020-08-17
Attending: INTERNAL MEDICINE
Payer: MEDICARE

## 2020-08-17 DIAGNOSIS — Z85.038 HISTORY OF COLON CANCER: ICD-10-CM

## 2020-08-17 DIAGNOSIS — E78.5 HYPERLIPIDEMIA, UNSPECIFIED HYPERLIPIDEMIA TYPE: ICD-10-CM

## 2020-08-17 LAB
BASOPHILS # BLD AUTO: 0.05 K/UL (ref 0–0.2)
BASOPHILS NFR BLD: 1.1 % (ref 0–1.9)
DIFFERENTIAL METHOD: ABNORMAL
EOSINOPHIL # BLD AUTO: 0.2 K/UL (ref 0–0.5)
EOSINOPHIL NFR BLD: 5.1 % (ref 0–8)
ERYTHROCYTE [DISTWIDTH] IN BLOOD BY AUTOMATED COUNT: 12.6 % (ref 11.5–14.5)
HCT VFR BLD AUTO: 37.2 % (ref 37–48.5)
HGB BLD-MCNC: 12.3 G/DL (ref 12–16)
IMM GRANULOCYTES # BLD AUTO: 0.02 K/UL (ref 0–0.04)
IMM GRANULOCYTES NFR BLD AUTO: 0.4 % (ref 0–0.5)
LYMPHOCYTES # BLD AUTO: 1.7 K/UL (ref 1–4.8)
LYMPHOCYTES NFR BLD: 38.4 % (ref 18–48)
MCH RBC QN AUTO: 32.9 PG (ref 27–31)
MCHC RBC AUTO-ENTMCNC: 33.1 G/DL (ref 32–36)
MCV RBC AUTO: 100 FL (ref 82–98)
MONOCYTES # BLD AUTO: 0.4 K/UL (ref 0.3–1)
MONOCYTES NFR BLD: 8.8 % (ref 4–15)
NEUTROPHILS # BLD AUTO: 2.1 K/UL (ref 1.8–7.7)
NEUTROPHILS NFR BLD: 46.2 % (ref 38–73)
NRBC BLD-RTO: 0 /100 WBC
PLATELET # BLD AUTO: 195 K/UL (ref 150–350)
PMV BLD AUTO: 11.3 FL (ref 9.2–12.9)
RBC # BLD AUTO: 3.74 M/UL (ref 4–5.4)
WBC # BLD AUTO: 4.53 K/UL (ref 3.9–12.7)

## 2020-08-17 PROCEDURE — 36415 COLL VENOUS BLD VENIPUNCTURE: CPT | Mod: PO

## 2020-08-17 PROCEDURE — 82378 CARCINOEMBRYONIC ANTIGEN: CPT

## 2020-08-17 PROCEDURE — 80053 COMPREHEN METABOLIC PANEL: CPT

## 2020-08-17 PROCEDURE — 80061 LIPID PANEL: CPT

## 2020-08-17 PROCEDURE — 83036 HEMOGLOBIN GLYCOSYLATED A1C: CPT

## 2020-08-17 PROCEDURE — 85025 COMPLETE CBC W/AUTO DIFF WBC: CPT

## 2020-08-18 LAB
ALBUMIN SERPL BCP-MCNC: 4 G/DL (ref 3.5–5.2)
ALP SERPL-CCNC: 72 U/L (ref 55–135)
ALT SERPL W/O P-5'-P-CCNC: 13 U/L (ref 10–44)
ANION GAP SERPL CALC-SCNC: 6 MMOL/L (ref 8–16)
AST SERPL-CCNC: 17 U/L (ref 10–40)
BILIRUB SERPL-MCNC: 1.5 MG/DL (ref 0.1–1)
BUN SERPL-MCNC: 10 MG/DL (ref 8–23)
CALCIUM SERPL-MCNC: 9.1 MG/DL (ref 8.7–10.5)
CEA SERPL-MCNC: 1.6 NG/ML (ref 0–5)
CHLORIDE SERPL-SCNC: 105 MMOL/L (ref 95–110)
CHOLEST SERPL-MCNC: 157 MG/DL (ref 120–199)
CHOLEST/HDLC SERPL: 3 {RATIO} (ref 2–5)
CO2 SERPL-SCNC: 29 MMOL/L (ref 23–29)
CREAT SERPL-MCNC: 0.8 MG/DL (ref 0.5–1.4)
EST. GFR  (AFRICAN AMERICAN): >60 ML/MIN/1.73 M^2
EST. GFR  (NON AFRICAN AMERICAN): >60 ML/MIN/1.73 M^2
ESTIMATED AVG GLUCOSE: 146 MG/DL (ref 68–131)
GLUCOSE SERPL-MCNC: 144 MG/DL (ref 70–110)
HBA1C MFR BLD HPLC: 6.7 % (ref 4–5.6)
HDLC SERPL-MCNC: 53 MG/DL (ref 40–75)
HDLC SERPL: 33.8 % (ref 20–50)
LDLC SERPL CALC-MCNC: 82.6 MG/DL (ref 63–159)
NONHDLC SERPL-MCNC: 104 MG/DL
POTASSIUM SERPL-SCNC: 3.7 MMOL/L (ref 3.5–5.1)
PROT SERPL-MCNC: 6.9 G/DL (ref 6–8.4)
SODIUM SERPL-SCNC: 140 MMOL/L (ref 136–145)
TRIGL SERPL-MCNC: 107 MG/DL (ref 30–150)

## 2020-08-19 ENCOUNTER — OFFICE VISIT (OUTPATIENT)
Dept: INTERNAL MEDICINE | Facility: CLINIC | Age: 75
End: 2020-08-19
Payer: MEDICARE

## 2020-08-19 VITALS
HEART RATE: 80 BPM | WEIGHT: 155.88 LBS | SYSTOLIC BLOOD PRESSURE: 130 MMHG | BODY MASS INDEX: 28.69 KG/M2 | DIASTOLIC BLOOD PRESSURE: 66 MMHG | HEIGHT: 62 IN | OXYGEN SATURATION: 97 %

## 2020-08-19 DIAGNOSIS — D12.6 COLON ADENOMAS: ICD-10-CM

## 2020-08-19 DIAGNOSIS — E11.9 CONTROLLED TYPE 2 DIABETES MELLITUS WITHOUT COMPLICATION, WITH LONG-TERM CURRENT USE OF INSULIN: Primary | ICD-10-CM

## 2020-08-19 DIAGNOSIS — J45.30 MILD PERSISTENT ASTHMA WITHOUT COMPLICATION: ICD-10-CM

## 2020-08-19 DIAGNOSIS — R19.7 DIARRHEA, UNSPECIFIED TYPE: ICD-10-CM

## 2020-08-19 DIAGNOSIS — Z12.83 SCREENING EXAM FOR SKIN CANCER: ICD-10-CM

## 2020-08-19 DIAGNOSIS — M25.561 RIGHT KNEE PAIN, UNSPECIFIED CHRONICITY: ICD-10-CM

## 2020-08-19 DIAGNOSIS — K21.9 GASTROESOPHAGEAL REFLUX DISEASE WITHOUT ESOPHAGITIS: ICD-10-CM

## 2020-08-19 DIAGNOSIS — Z79.4 CONTROLLED TYPE 2 DIABETES MELLITUS WITHOUT COMPLICATION, WITH LONG-TERM CURRENT USE OF INSULIN: Primary | ICD-10-CM

## 2020-08-19 DIAGNOSIS — K22.2 SCHATZKI'S RING: ICD-10-CM

## 2020-08-19 DIAGNOSIS — J45.20 MILD INTERMITTENT ASTHMA WITHOUT COMPLICATION: ICD-10-CM

## 2020-08-19 DIAGNOSIS — Z85.038 HISTORY OF COLON CANCER: ICD-10-CM

## 2020-08-19 DIAGNOSIS — R92.30 DENSE BREAST: ICD-10-CM

## 2020-08-19 DIAGNOSIS — E78.00 ELEVATED LDL CHOLESTEROL LEVEL: ICD-10-CM

## 2020-08-19 DIAGNOSIS — Z86.000 HISTORY OF DUCTAL CARCINOMA IN SITU (DCIS) OF BREAST: ICD-10-CM

## 2020-08-19 PROCEDURE — 99214 OFFICE O/P EST MOD 30 MIN: CPT | Mod: S$GLB,,, | Performed by: INTERNAL MEDICINE

## 2020-08-19 PROCEDURE — 99999 PR PBB SHADOW E&M-EST. PATIENT-LVL V: CPT | Mod: PBBFAC,,, | Performed by: INTERNAL MEDICINE

## 2020-08-19 PROCEDURE — 99214 PR OFFICE/OUTPT VISIT, EST, LEVL IV, 30-39 MIN: ICD-10-PCS | Mod: S$GLB,,, | Performed by: INTERNAL MEDICINE

## 2020-08-19 PROCEDURE — 99499 RISK ADDL DX/OHS AUDIT: ICD-10-PCS | Mod: S$GLB,,, | Performed by: INTERNAL MEDICINE

## 2020-08-19 PROCEDURE — 99999 PR PBB SHADOW E&M-EST. PATIENT-LVL V: ICD-10-PCS | Mod: PBBFAC,,, | Performed by: INTERNAL MEDICINE

## 2020-08-19 PROCEDURE — 99499 UNLISTED E&M SERVICE: CPT | Mod: S$GLB,,, | Performed by: INTERNAL MEDICINE

## 2020-08-19 RX ORDER — FLUTICASONE PROPIONATE AND SALMETEROL 100; 50 UG/1; UG/1
1 POWDER RESPIRATORY (INHALATION) 2 TIMES DAILY
Qty: 60 EACH | Refills: 12 | Status: SHIPPED | OUTPATIENT
Start: 2020-08-19 | End: 2021-11-01

## 2020-08-19 NOTE — PROGRESS NOTES
"Subjective:      Patient ID: Winnie Ngo is a 75 y.o. female.    Chief Complaint: Follow-up (Wellness and annual reivew)    HPI:  HPI    The following assessments were completed    Living situation: independent  CAGE: does not drink alcohol  Depression screening: no  Timed Get Up and Go good  Whisper Test: hearing decreased has had audiogram in the past  Cognitive Function Screening:none  Nutrition Screening: good  ADL Screening: knee pain    Discuss of insomnia and use of melatonin    " Black " skin in the inguinal area and chalky white areas on her face    Muscular pain in the left neck.    Diabetes Management Status    Statin: Taking  ACE/ARB: Not taking    Screening or Prevention Patient's value Goal Complete/Controlled?   HgA1C Testing and Control   Lab Results   Component Value Date    HGBA1C 6.7 (H) 08/17/2020      Annually/Less than 8% Yes   Lipid profile : 08/17/2020 Annually Yes   LDL control Lab Results   Component Value Date    LDLCALC 82.6 08/17/2020    Annually/Less than 100 mg/dl  Yes   Nephropathy screening Lab Results   Component Value Date    LABMICR 18.0 10/21/2019     Lab Results   Component Value Date    PROTEINUA Negative 10/09/2012    Annually Yes   Blood pressure BP Readings from Last 1 Encounters:   08/19/20 130/66    Less than 140/90 Yes   Dilated retinal exam : 03/13/2019 Annually No   Foot exam   : 10/21/2019 Annually Yes       Patient Active Problem List   Diagnosis    Hyperlipidemia: without diabetes ASCVD Risk 15.5%   with diabetes 27.9% ( elevated glucoses)    GERD (gastroesophageal reflux disease)    Osteopenia    History of endoscopy    Pulmonary nodules    Kidney stones    Surgery, elective:Hysterectomy oophorectomy     Schatzki's ring    Atrophic vaginitis    History of colon cancer    Colon adenomas    Diarrhea    Nonspecific (abnormal) findings on radiological and other examination of abdominal area, including retroperitoneum    Mild vitamin D deficiency    " DJD (degenerative joint disease) of lumbar spine, mild    Chronic LBP    Osteoarthritis of left knee, severe    Right lumbar radiculopathy    Trochanteric bursitis of right hip    Elevated LDL cholesterol level    Abnormal gastrointestinal PET scan    Bilateral dry eyes    Nuclear sclerotic cataract of both eyes    Mild intermittent asthma without complication    History of ductal carcinoma in situ (DCIS) of breast    Controlled type 2 diabetes mellitus, without long-term current use of insulin     Past Medical History:   Diagnosis Date    Allergy     Anxiety     Asthma     Benign neoplasm of colon     Breast cancer     Cancer     colon    Cataract     Colon cancer     COPD (chronic obstructive pulmonary disease)     Fever blister     GERD (gastroesophageal reflux disease)     Hyperlipidemia     Joint pain     Osteopenia     Personal history of malignant neoplasm of large intestine      Past Surgical History:   Procedure Laterality Date    APPENDECTOMY      BREAST BIOPSY Right 1980's    exc bx     BREAST BIOPSY Left 2016    lumpectomy    BREAST LUMPECTOMY Left 2016    CHOLECYSTECTOMY      COLON SURGERY      stage T1 N0 M0    HYSTERECTOMY      KNEE SURGERY      arthroscopic    NASAL SEPTUM SURGERY      with removal of polyps    sigmoid colectomy      SINUS SURGERY      TOTAL ABDOMINAL HYSTERECTOMY W/ BILATERAL SALPINGOOPHORECTOMY      UPPER ENDOSCOPY W/ BANDING  2004    Schatzki's ring dialated 2 sessile polyps in stomach     Family History   Problem Relation Age of Onset    Alzheimer's disease Mother     Kidney disease Mother         kidney stone    Heart disease Father 41        MI    Stroke Father     Cancer Brother         leg    Cancer Brother         Kidney cancer    Cancer Maternal Uncle     Melanoma Neg Hx      Review of Systems   Constitutional: Negative for chills, fatigue, fever and unexpected weight change.   HENT: Negative for trouble swallowing.   "  Respiratory: Negative for cough, shortness of breath and wheezing.    Cardiovascular: Negative for chest pain, palpitations and leg swelling.   Gastrointestinal: Negative for abdominal distention, abdominal pain, blood in stool and vomiting.     Objective:     Vitals:    08/19/20 1002   BP: 130/66   Pulse: 80   SpO2: 97%   Weight: 70.7 kg (155 lb 13.8 oz)   Height: 5' 2" (1.575 m)   PainSc: 0-No pain     Body mass index is 28.51 kg/m².  Physical Exam  Constitutional:       General: She is not in acute distress.     Appearance: She is well-developed.   Neck:      Thyroid: No thyromegaly.      Vascular: No carotid bruit.   Cardiovascular:      Rate and Rhythm: Normal rate and regular rhythm.      Chest Wall: PMI is not displaced.      Heart sounds: Normal heart sounds.   Pulmonary:      Effort: Pulmonary effort is normal. No respiratory distress.      Breath sounds: Normal breath sounds.   Abdominal:      General: Bowel sounds are normal. There is no distension.      Palpations: Abdomen is soft.      Tenderness: There is no abdominal tenderness.   Neurological:      Mental Status: She is alert and oriented to person, place, and time.       Assessment:     1. Wellness examination    2. Mild intermittent asthma without complication    3. Colon adenomas    4. History of ductal carcinoma in situ (DCIS) of breast    5. Diarrhea, unspecified type    6. Elevated LDL cholesterol level    7. Gastroesophageal reflux disease without esophagitis    8. History of colon cancer    9. Schatzki's ring    10. Right knee pain, unspecified chronicity    11. Mild persistent asthma without complication    12. Controlled type 2 diabetes mellitus with stage 2 chronic kidney disease, without long-term current use of insulin    13. Dense breast    14. Screening exam for skin cancer      Plan:   Winnie was seen today for follow-up.    Diagnoses and all orders for this visit:    Wellness examination:  Patient asked that this be billed out as a " wellness exam    Mild intermittent asthma without complication:  Renewed Advair    Colon adenomas patient prefers not to do colonoscopy this year    History of ductal carcinoma in situ (DCIS) of breast mammograms yearly    Diarrhea, possibly related to gallbladder surgery    Elevated LDL cholesterol level continue to monitor continue atorvastatin    Gastroesophageal reflux disease without esophagitis continue to monitor    History of colon cancer    Schatzki's ring dilation as needed    Right knee pain, unspecified chronicity patient requested handicap license    Mild persistent asthma without complication  Comments:  Reduce the Advair at the request of the patient, now on 250-50  Orders:  -     fluticasone-salmeterol diskus inhaler 100-50 mcg; Inhale 1 puff into the lungs 2 (two) times daily. Controller    Controlled type 2 diabetes mellitus with stage 2 chronic kidney disease, without long-term current use of insulin  -     Diabetic Eye Screening Photo; Future    Dense breast  -     Mammo Digital Diagnostic Bilat w/ Duc; Future    Screening exam for skin cancer  -     Ambulatory referral/consult to Dermatology; Future        Problem List Items Addressed This Visit     GERD (gastroesophageal reflux disease)    Schatzki's ring    History of colon cancer    Colon adenomas    Diarrhea    Elevated LDL cholesterol level    Mild intermittent asthma without complication    History of ductal carcinoma in situ (DCIS) of breast    Controlled type 2 diabetes mellitus, without long-term current use of insulin    Relevant Orders    Diabetic Eye Screening Photo      Other Visit Diagnoses     Wellness examination    -  Primary    Right knee pain, unspecified chronicity        Mild persistent asthma without complication        Reduce the Advair at the request of the patient, now on 250-50    Relevant Medications    fluticasone-salmeterol diskus inhaler 100-50 mcg    Dense breast        Relevant Orders    Mammo Digital Diagnostic  Bilat w/ Duc    Screening exam for skin cancer        Relevant Orders    Ambulatory referral/consult to Dermatology        Orders Placed This Encounter   Procedures    Mammo Digital Diagnostic Bilat w/ Duc     Standing Status:   Future     Standing Expiration Date:   10/19/2021     Order Specific Question:   May the Radiologist modify the order per protocol to meet the clinical needs of the patient?     Answer:   Yes    Ambulatory referral/consult to Dermatology     Standing Status:   Future     Standing Expiration Date:   9/19/2021     Referral Priority:   Routine     Referral Type:   Consultation     Referral Reason:   Specialty Services Required     Requested Specialty:   Dermatology     Number of Visits Requested:   1    Diabetic Eye Screening Photo     Standing Status:   Future     Standing Expiration Date:   8/19/2021     Follow up in about 6 months (around 2/19/2021) for Follow up.     Medication List          Accurate as of August 19, 2020 10:53 AM. If you have any questions, ask your nurse or doctor.            CONTINUE taking these medications    albuterol 90 mcg/actuation inhaler  Commonly known as: PROVENTIL/VENTOLIN HFA  Inhale 2 puffs into the lungs every 6 (six) hours as needed for Wheezing. May substitute any albuterol inhaler on her plan.     atorvastatin 10 MG tablet  Commonly known as: LIPITOR  TAKE 1 TABLET(10 MG) BY MOUTH EVERY EVENING     fluticasone-salmeterol 100-50 mcg/dose 100-50 mcg/dose diskus inhaler  Commonly known as: ADVAIR  Inhale 1 puff into the lungs 2 (two) times daily. Controller     lancets Misc  1 each by Misc.(Non-Drug; Combo Route) route once daily.     LORazepam 1 MG tablet  Commonly known as: ATIVAN  TAKE 1 TABLET EVERY EVENINGAS NEEDED FOR PANIC ATTACKS     pantoprazole 20 MG tablet  Commonly known as: PROTONIX  TAKE 1 TABLET(20 MG) BY MOUTH EVERY DAY     TRUE METRIX GLUCOSE TEST STRIP Strp  Generic drug: blood sugar diagnostic  USE TO MEASURE BLOOD SUGAR DAILY      VITAMIN D3 25 mcg (1,000 unit) capsule  Generic drug: cholecalciferol (vitamin D3)           Where to Get Your Medications      [id number] [place] - [address] [facility] [place] [address]    Phone: [phone]   · fluticasone-salmeterol 100-50 mcg/dose 100-50 mcg/dose diskus inhaler

## 2020-08-20 ENCOUNTER — TELEPHONE (OUTPATIENT)
Dept: INTERNAL MEDICINE | Facility: CLINIC | Age: 75
End: 2020-08-20

## 2020-08-20 NOTE — TELEPHONE ENCOUNTER
Right ear infection/Teething    1. Augmentin x 10 days    2. Ear recheck/vaccines in 2 weeks    4. Tylenol or ibuprofen as needed for fever or pain  --If antibiotic is working correctly, any fever and pain should resolve within 72 hours of starting the antibiotic    5. See us for a recheck if your child has a fever > 100.4 or pain that persists after 72 hours into the antibiotic course    Signed

## 2020-08-20 NOTE — TELEPHONE ENCOUNTER
----- Message from Chata Mejia sent at 8/20/2020 11:12 AM CDT -----  Contact: Pt [phone]  Patient is requesting a handicap form and her blood work results to be mailed to her current address.    Please call and advise.    Thank You

## 2020-08-28 ENCOUNTER — TELEPHONE (OUTPATIENT)
Dept: INTERNAL MEDICINE | Facility: CLINIC | Age: 75
End: 2020-08-28

## 2020-08-28 NOTE — TELEPHONE ENCOUNTER
Stage 2/5:   with 1/5 being when we are born and 5/5 when they are not functioning well. This characterizes her kidney function as a diabetic and stage 2 is thus good.    The handicap paperwork is on [employer] desk.

## 2020-08-28 NOTE — TELEPHONE ENCOUNTER
Spoke with pt, she req a handicap tag, and she was concerned about one of her dx stated she had stage 2 kidney disease. She was wondering if that was true or if a different dx was suppose to be put in. Also she would like her labs mailed to her. Please advise.

## 2020-08-28 NOTE — TELEPHONE ENCOUNTER
----- Message from Chata Mejia sent at 8/28/2020  1:31 PM CDT -----  Contact: Pt [phone]  Patient is requesting a call about a personal matter.    Please call and advise.    Thank You

## 2020-10-05 ENCOUNTER — TELEPHONE (OUTPATIENT)
Dept: INTERNAL MEDICINE | Facility: CLINIC | Age: 75
End: 2020-10-05

## 2020-10-05 NOTE — TELEPHONE ENCOUNTER
----- Message from Aliya Zavala MA sent at 10/5/2020  4:20 PM CDT -----  Regarding: FW: Paper copy of labs  Contact: Patient @ [phone]    ----- Message -----  From: Deana Granda  Sent: 10/5/2020   4:12 PM CDT  To: [facility] Staff  Subject: Paper copy of labs                               Good Afternoon  Patient would like a paper copy of her labs sent please to her home    Thank you

## 2020-10-08 ENCOUNTER — IMMUNIZATION (OUTPATIENT)
Dept: FAMILY MEDICINE | Facility: CLINIC | Age: 75
End: 2020-10-08
Payer: MEDICARE

## 2020-10-08 DIAGNOSIS — Z23 NEED FOR INFLUENZA VACCINATION: Primary | ICD-10-CM

## 2020-10-08 PROCEDURE — G0008 FLU VACCINE - QUADRIVALENT - ADJUVANTED: ICD-10-PCS | Mod: S$GLB,,, | Performed by: FAMILY MEDICINE

## 2020-10-08 PROCEDURE — 90694 FLU VACCINE - QUADRIVALENT - ADJUVANTED: ICD-10-PCS | Mod: S$GLB,,, | Performed by: FAMILY MEDICINE

## 2020-10-08 PROCEDURE — G0008 ADMIN INFLUENZA VIRUS VAC: HCPCS | Mod: S$GLB,,, | Performed by: FAMILY MEDICINE

## 2020-10-08 PROCEDURE — 90694 VACC AIIV4 NO PRSRV 0.5ML IM: CPT | Mod: S$GLB,,, | Performed by: FAMILY MEDICINE

## 2020-10-26 ENCOUNTER — PES CALL (OUTPATIENT)
Dept: ADMINISTRATIVE | Facility: CLINIC | Age: 75
End: 2020-10-26

## 2020-11-10 ENCOUNTER — OFFICE VISIT (OUTPATIENT)
Dept: FAMILY MEDICINE | Facility: CLINIC | Age: 75
End: 2020-11-10
Payer: MEDICARE

## 2020-11-10 VITALS
DIASTOLIC BLOOD PRESSURE: 70 MMHG | BODY MASS INDEX: 28.52 KG/M2 | OXYGEN SATURATION: 96 % | SYSTOLIC BLOOD PRESSURE: 124 MMHG | WEIGHT: 155 LBS | HEIGHT: 62 IN | HEART RATE: 87 BPM

## 2020-11-10 DIAGNOSIS — K21.9 GASTROESOPHAGEAL REFLUX DISEASE WITHOUT ESOPHAGITIS: ICD-10-CM

## 2020-11-10 DIAGNOSIS — E78.5 HYPERLIPIDEMIA ASSOCIATED WITH TYPE 2 DIABETES MELLITUS: ICD-10-CM

## 2020-11-10 DIAGNOSIS — N18.2 CONTROLLED TYPE 2 DIABETES MELLITUS WITH STAGE 2 CHRONIC KIDNEY DISEASE, WITHOUT LONG-TERM CURRENT USE OF INSULIN: ICD-10-CM

## 2020-11-10 DIAGNOSIS — F41.0 PANIC DISORDER (EPISODIC PAROXYSMAL ANXIETY): ICD-10-CM

## 2020-11-10 DIAGNOSIS — E11.69 HYPERLIPIDEMIA ASSOCIATED WITH TYPE 2 DIABETES MELLITUS: ICD-10-CM

## 2020-11-10 DIAGNOSIS — E66.3 OVERWEIGHT (BMI 25.0-29.9): ICD-10-CM

## 2020-11-10 DIAGNOSIS — J45.20 MILD INTERMITTENT ASTHMA WITHOUT COMPLICATION: ICD-10-CM

## 2020-11-10 DIAGNOSIS — E55.9 MILD VITAMIN D DEFICIENCY: ICD-10-CM

## 2020-11-10 DIAGNOSIS — E11.22 CONTROLLED TYPE 2 DIABETES MELLITUS WITH STAGE 2 CHRONIC KIDNEY DISEASE, WITHOUT LONG-TERM CURRENT USE OF INSULIN: ICD-10-CM

## 2020-11-10 DIAGNOSIS — G47.00 INSOMNIA, UNSPECIFIED TYPE: ICD-10-CM

## 2020-11-10 DIAGNOSIS — Z00.00 ENCOUNTER FOR PREVENTIVE HEALTH EXAMINATION: Primary | ICD-10-CM

## 2020-11-10 DIAGNOSIS — Z11.59 NEED FOR HEPATITIS C SCREENING TEST: ICD-10-CM

## 2020-11-10 PROCEDURE — 99999 PR PBB SHADOW E&M-EST. PATIENT-LVL IV: ICD-10-PCS | Mod: PBBFAC,,, | Performed by: NURSE PRACTITIONER

## 2020-11-10 PROCEDURE — 99499 RISK ADDL DX/OHS AUDIT: ICD-10-PCS | Mod: S$GLB,,, | Performed by: NURSE PRACTITIONER

## 2020-11-10 PROCEDURE — 1126F PR PAIN SEVERITY QUANTIFIED, NO PAIN PRESENT: ICD-10-PCS | Mod: S$GLB,,, | Performed by: NURSE PRACTITIONER

## 2020-11-10 PROCEDURE — 1101F PR PT FALLS ASSESS DOC 0-1 FALLS W/OUT INJ PAST YR: ICD-10-PCS | Mod: CPTII,S$GLB,, | Performed by: NURSE PRACTITIONER

## 2020-11-10 PROCEDURE — 3072F PR LOW RISK FOR RETINOPATHY: ICD-10-PCS | Mod: S$GLB,,, | Performed by: NURSE PRACTITIONER

## 2020-11-10 PROCEDURE — G0439 PR MEDICARE ANNUAL WELLNESS SUBSEQUENT VISIT: ICD-10-PCS | Mod: S$GLB,,, | Performed by: NURSE PRACTITIONER

## 2020-11-10 PROCEDURE — 1126F AMNT PAIN NOTED NONE PRSNT: CPT | Mod: S$GLB,,, | Performed by: NURSE PRACTITIONER

## 2020-11-10 PROCEDURE — G0439 PPPS, SUBSEQ VISIT: HCPCS | Mod: S$GLB,,, | Performed by: NURSE PRACTITIONER

## 2020-11-10 PROCEDURE — 3288F FALL RISK ASSESSMENT DOCD: CPT | Mod: CPTII,S$GLB,, | Performed by: NURSE PRACTITIONER

## 2020-11-10 PROCEDURE — 99499 UNLISTED E&M SERVICE: CPT | Mod: S$GLB,,, | Performed by: NURSE PRACTITIONER

## 2020-11-10 PROCEDURE — 99999 PR PBB SHADOW E&M-EST. PATIENT-LVL IV: CPT | Mod: PBBFAC,,, | Performed by: NURSE PRACTITIONER

## 2020-11-10 PROCEDURE — 3288F PR FALLS RISK ASSESSMENT DOCUMENTED: ICD-10-PCS | Mod: CPTII,S$GLB,, | Performed by: NURSE PRACTITIONER

## 2020-11-10 PROCEDURE — 1101F PT FALLS ASSESS-DOCD LE1/YR: CPT | Mod: CPTII,S$GLB,, | Performed by: NURSE PRACTITIONER

## 2020-11-10 PROCEDURE — 3072F LOW RISK FOR RETINOPATHY: CPT | Mod: S$GLB,,, | Performed by: NURSE PRACTITIONER

## 2020-11-10 RX ORDER — CYANOCOBALAMIN (VITAMIN B-12) 500 MCG
1 TABLET ORAL NIGHTLY
COMMUNITY

## 2020-11-10 NOTE — PATIENT INSTRUCTIONS
Counseling and Referral of Other Preventative  (Italic type indicates deductible and co-insurance are waived)    Patient Name: Winnie Ngo  Today's Date: 11/10/2020    Health Maintenance       Date Due Completion Date    Hepatitis C Screening 1945 ---    TETANUS VACCINE 06/04/2008 6/4/1998    Eye Exam 03/13/2020 3/13/2019    Foot Exam 10/21/2020 10/21/2019    Override on 10/21/2019: Done (Dr. Merchant)    Override on 7/11/2018: Done    Urine Microalbumin 10/21/2020 10/21/2019    Mammogram 12/10/2020 12/10/2019    Override on 9/14/2011: (N/S)    Hemoglobin A1c 02/17/2021 8/17/2020    DEXA SCAN 08/08/2021 8/8/2018    Override on 5/11/2011: (N/S)    Lipid Panel 08/17/2021 8/17/2020        Orders Placed This Encounter   Procedures    Hepatitis C Antibody     The following information is provided to all patients.  This information is to help you find resources for any of the problems found today that may be affecting your health:                Living healthy guide: www.[facility].[place].gov      Understanding Diabetes: www.diabetes.org      Eating healthy: www.cdc.gov/healthyweight      CDC home safety checklist: www.cdc.gov/steadi/patient.html      Agency on Aging: www.goea.[place].[facility]      Alcoholics anonymous (AA): www.aa.org      Physical Activity: www.jeannine.nih.gov/hf2slpw      Tobacco use: www.quitwithusla.org

## 2020-11-10 NOTE — PROGRESS NOTES
"  Winnie Ngo presented for a  Medicare AWV and comprehensive Health Risk Assessment today. The following components were reviewed and updated:    · Medical history  · Family History  · Social history  · Allergies and Current Medications  · Health Risk Assessment  · Health Maintenance  · Care Team     ** See Completed Assessments for Annual Wellness Visit within the encounter summary.**         The following assessments were completed:  · Living Situation  · CAGE  · Depression Screening  · Timed Get Up and Go  · Whisper Test  · Cognitive Function Screening  · Nutrition Screening  · ADL Screening  · PAQ Screening        Vitals:    11/10/20 1008   BP: 124/70   BP Location: Right arm   Patient Position: Sitting   BP Method: Medium (Manual)   Pulse: 87   SpO2: 96%   Weight: 70.3 kg (154 lb 15.7 oz)   Height: 5' 2" (1.575 m)     Body mass index is 28.35 kg/m².     Physical Exam  Vitals signs and nursing note reviewed.   Constitutional:       General: She is not in acute distress.     Appearance: She is well-developed.   HENT:      Head: Normocephalic and atraumatic.      Ears:      Comments: Hard of hearing  Eyes:      Pupils: Pupils are equal, round, and reactive to light.   Neck:      Vascular: No JVD.      Trachea: No tracheal deviation.   Cardiovascular:      Rate and Rhythm: Normal rate and regular rhythm.      Heart sounds: Normal heart sounds. No murmur.   Pulmonary:      Effort: Pulmonary effort is normal. No respiratory distress.      Breath sounds: Normal breath sounds. No wheezing or rales.   Musculoskeletal: Normal range of motion.         General: No tenderness.   Skin:     General: Skin is warm and dry.      Coloration: Skin is not pale.      Findings: No erythema.   Neurological:      Mental Status: She is alert and oriented to person, place, and time.      Coordination: Coordination normal.   Psychiatric:         Behavior: Behavior normal.         Thought Content: Thought content normal. Thought content " does not include homicidal or suicidal ideation.         Judgment: Judgment normal.           Diagnoses and health risks identified today and associated recommendations/orders:    1. Encounter for preventive health examination    2. Controlled type 2 diabetes mellitus with stage 2 chronic kidney disease, without long-term current use of insulin  Chronic; stable with lifestyle modifications.  Followed by PCP.    3. Hyperlipidemia associated with type 2 diabetes mellitus  Chronic; stable on medication.  Followed by PCP.    4. Mild intermittent asthma without complication  Chronic; stable on medication.  Followed by PCP.    5. Gastroesophageal reflux disease without esophagitis  Chronic; stable on medication.  Followed by PCP.    6. Mild vitamin D deficiency  Chronic; stable on medication.  Followed by PCP.    7. Panic disorder (episodic paroxysmal anxiety)  Chronic; stable on medication.  Followed by PCP.    8. Insomnia, unspecified type  Chronic; stable on medication.  Followed by PCP.    9. Overweight (BMI 25.0-29.9)  Chronic, stable. Therapeutic lifestyle changes discussed. Followed by PCP.    10. Need for hepatitis C screening test  - Hepatitis C Antibody; Future      Provided Winnie with a 5-10 year written screening schedule and personal prevention plan. Recommendations were developed using the USPSTF age appropriate recommendations. Education, counseling, and referrals were provided as needed. After Visit Summary printed and given to patient which includes a list of additional screenings\tests needed.    Follow up for Annual Wellness Visit in 1 year.    Karol Hamilton NP         I offered to discuss end of life issues, including information on how to make advance directives that the patient could use to name someone who would make medical decisions on their behalf if they became too ill to make themselves.    ___Patient declined  _X_Patient is interested, I provided paper work and offered to discuss.

## 2020-11-12 PROBLEM — G47.00 INSOMNIA: Status: ACTIVE | Noted: 2020-11-12

## 2020-11-12 PROBLEM — E66.3 OVERWEIGHT (BMI 25.0-29.9): Status: ACTIVE | Noted: 2020-11-12

## 2020-11-12 PROBLEM — E78.5 HYPERLIPIDEMIA ASSOCIATED WITH TYPE 2 DIABETES MELLITUS: Status: ACTIVE | Noted: 2020-11-12

## 2020-11-12 PROBLEM — E11.69 HYPERLIPIDEMIA ASSOCIATED WITH TYPE 2 DIABETES MELLITUS: Status: ACTIVE | Noted: 2020-11-12

## 2020-11-12 PROBLEM — F41.0 PANIC DISORDER (EPISODIC PAROXYSMAL ANXIETY): Status: ACTIVE | Noted: 2020-11-12

## 2020-12-10 ENCOUNTER — TELEPHONE (OUTPATIENT)
Dept: INTERNAL MEDICINE | Facility: CLINIC | Age: 75
End: 2020-12-10

## 2020-12-10 NOTE — TELEPHONE ENCOUNTER
I have ordered a diagnostic mammo that is only done at [facility] so this should be booked at [facility]

## 2020-12-10 NOTE — TELEPHONE ENCOUNTER
----- Message from Corrina Grajeda sent at 12/10/2020 12:29 PM CST -----  Regarding: orders  Contact: pt @ [phone] or   Pt calling to schedule annual mammogram, needing orders placed. Please call.

## 2020-12-10 NOTE — TELEPHONE ENCOUNTER
Yes Dr Merchant can see the order .You placed in on 8/19/20 at the Imaging center here. When I spoke with pt .She said her Dr or surgeon who did her surgery wanyed her to go to the [facility] for her Mammogram across from [facility] . Thx Griselda

## 2020-12-11 NOTE — TELEPHONE ENCOUNTER
Left voice mail for pt to return call Re : Can call [facility] to schedule appt [phone] Thx Griselda

## 2020-12-11 NOTE — TELEPHONE ENCOUNTER
Spoke with pt .She said she already made her appt with [facility] Thx Griselda

## 2020-12-16 ENCOUNTER — HOSPITAL ENCOUNTER (OUTPATIENT)
Dept: RADIOLOGY | Facility: HOSPITAL | Age: 75
Discharge: HOME | End: 2020-12-16
Attending: INTERNAL MEDICINE
Payer: MEDICARE

## 2020-12-16 DIAGNOSIS — R92.30 DENSE BREAST: ICD-10-CM

## 2020-12-16 PROCEDURE — 77066 DX MAMMO INCL CAD BI: CPT | Mod: TC

## 2020-12-16 PROCEDURE — 77062 BREAST TOMOSYNTHESIS BI: CPT | Mod: 26,,, | Performed by: RADIOLOGY

## 2020-12-16 PROCEDURE — 77062 MAMMO DIGITAL DIAGNOSTIC BILAT WITH TOMOSYNTHESIS_CAD: ICD-10-PCS | Mod: 26,,, | Performed by: RADIOLOGY

## 2020-12-16 PROCEDURE — 77066 DX MAMMO INCL CAD BI: CPT | Mod: 26,,, | Performed by: RADIOLOGY

## 2020-12-16 PROCEDURE — 77066 MAMMO DIGITAL DIAGNOSTIC BILAT WITH TOMOSYNTHESIS_CAD: ICD-10-PCS | Mod: 26,,, | Performed by: RADIOLOGY

## 2021-01-07 ENCOUNTER — HOSPITAL ENCOUNTER (OUTPATIENT)
Dept: RADIOLOGY | Facility: HOSPITAL | Age: 76
Discharge: HOME | End: 2021-01-07
Attending: INTERNAL MEDICINE
Payer: MEDICARE

## 2021-01-07 DIAGNOSIS — R92.8 ABNORMAL MAMMOGRAM: ICD-10-CM

## 2021-01-07 PROCEDURE — 88341 IMHCHEM/IMCYTCHM EA ADD ANTB: CPT | Performed by: PATHOLOGY

## 2021-01-07 PROCEDURE — 88305 TISSUE EXAM BY PATHOLOGIST: CPT | Mod: 59 | Performed by: PATHOLOGY

## 2021-01-07 PROCEDURE — 88305 TISSUE EXAM BY PATHOLOGIST: ICD-10-PCS | Mod: 26,,, | Performed by: PATHOLOGY

## 2021-01-07 PROCEDURE — 25000003 PHARM REV CODE 250: Performed by: INTERNAL MEDICINE

## 2021-01-07 PROCEDURE — 88342 IMHCHEM/IMCYTCHM 1ST ANTB: CPT | Performed by: PATHOLOGY

## 2021-01-07 PROCEDURE — 88342 CHG IMMUNOCYTOCHEMISTRY: ICD-10-PCS | Mod: 26,,, | Performed by: PATHOLOGY

## 2021-01-07 PROCEDURE — 19081 MAMMO BREAST STEREOTACTIC BREAST BIOPSY RIGHT: ICD-10-PCS | Mod: RT,,, | Performed by: RADIOLOGY

## 2021-01-07 PROCEDURE — 27200939 MAMMO BREAST STEREOTACTIC BREAST BIOPSY RIGHT

## 2021-01-07 PROCEDURE — 19081 BX BREAST 1ST LESION STRTCTC: CPT | Mod: RT,,, | Performed by: RADIOLOGY

## 2021-01-07 PROCEDURE — 88341 IMHCHEM/IMCYTCHM EA ADD ANTB: CPT | Mod: 26,,, | Performed by: PATHOLOGY

## 2021-01-07 PROCEDURE — 88341 PR IHC OR ICC EACH ADD'L SINGLE ANTIBODY  STAINPR: ICD-10-PCS | Mod: 26,,, | Performed by: PATHOLOGY

## 2021-01-07 PROCEDURE — 88305 TISSUE EXAM BY PATHOLOGIST: CPT | Mod: 26,,, | Performed by: PATHOLOGY

## 2021-01-07 PROCEDURE — 88342 IMHCHEM/IMCYTCHM 1ST ANTB: CPT | Mod: 26,,, | Performed by: PATHOLOGY

## 2021-01-07 RX ORDER — LIDOCAINE HYDROCHLORIDE AND EPINEPHRINE 20; 10 MG/ML; UG/ML
20 INJECTION, SOLUTION INFILTRATION; PERINEURAL ONCE
Status: COMPLETED | OUTPATIENT
Start: 2021-01-07 | End: 2021-01-07

## 2021-01-07 RX ORDER — LIDOCAINE HYDROCHLORIDE 10 MG/ML
5 INJECTION INFILTRATION; PERINEURAL ONCE
Status: COMPLETED | OUTPATIENT
Start: 2021-01-07 | End: 2021-01-07

## 2021-01-07 RX ADMIN — LIDOCAINE HYDROCHLORIDE,EPINEPHRINE BITARTRATE 20 ML: 20; .01 INJECTION, SOLUTION INFILTRATION; PERINEURAL at 01:01

## 2021-01-07 RX ADMIN — LIDOCAINE HYDROCHLORIDE 5 ML: 10 INJECTION, SOLUTION EPIDURAL; INFILTRATION; INTRACAUDAL; PERINEURAL at 01:01

## 2021-01-12 ENCOUNTER — TELEPHONE (OUTPATIENT)
Dept: SURGERY | Facility: CLINIC | Age: 76
End: 2021-01-12

## 2021-01-12 LAB
FINAL PATHOLOGIC DIAGNOSIS: NORMAL
GROSS: NORMAL
MICROSCOPIC EXAM: NORMAL

## 2021-02-11 ENCOUNTER — IMMUNIZATION (OUTPATIENT)
Dept: PHARMACY | Facility: CLINIC | Age: 76
End: 2021-02-11
Payer: MEDICARE

## 2021-02-11 DIAGNOSIS — Z23 NEED FOR VACCINATION: Primary | ICD-10-CM

## 2021-03-11 ENCOUNTER — IMMUNIZATION (OUTPATIENT)
Dept: PHARMACY | Facility: CLINIC | Age: 76
End: 2021-03-11
Payer: MEDICARE

## 2021-03-11 DIAGNOSIS — Z23 NEED FOR VACCINATION: Primary | ICD-10-CM

## 2021-03-18 ENCOUNTER — TELEPHONE (OUTPATIENT)
Dept: INTERNAL MEDICINE | Facility: CLINIC | Age: 76
End: 2021-03-18

## 2021-03-18 DIAGNOSIS — F41.9 ANXIETY: ICD-10-CM

## 2021-03-18 RX ORDER — LORAZEPAM 1 MG/1
TABLET ORAL
Qty: 30 TABLET | Refills: 1 | Status: SHIPPED | OUTPATIENT
Start: 2021-03-18 | End: 2021-05-20 | Stop reason: SDUPTHER

## 2021-05-06 ENCOUNTER — OFFICE VISIT (OUTPATIENT)
Dept: INTERNAL MEDICINE | Facility: CLINIC | Age: 76
End: 2021-05-06
Payer: MEDICARE

## 2021-05-06 ENCOUNTER — LAB VISIT (OUTPATIENT)
Dept: LAB | Facility: HOSPITAL | Age: 76
End: 2021-05-06
Attending: INTERNAL MEDICINE
Payer: MEDICARE

## 2021-05-06 VITALS
HEART RATE: 83 BPM | HEIGHT: 62 IN | SYSTOLIC BLOOD PRESSURE: 126 MMHG | OXYGEN SATURATION: 98 % | WEIGHT: 150.38 LBS | BODY MASS INDEX: 27.67 KG/M2 | DIASTOLIC BLOOD PRESSURE: 68 MMHG

## 2021-05-06 DIAGNOSIS — E11.69 HYPERLIPIDEMIA ASSOCIATED WITH TYPE 2 DIABETES MELLITUS: ICD-10-CM

## 2021-05-06 DIAGNOSIS — Z79.4 CONTROLLED TYPE 2 DIABETES MELLITUS WITHOUT COMPLICATION, WITH LONG-TERM CURRENT USE OF INSULIN: Primary | ICD-10-CM

## 2021-05-06 DIAGNOSIS — Z12.11 COLON CANCER SCREENING: ICD-10-CM

## 2021-05-06 DIAGNOSIS — M81.0 POSTMENOPAUSAL BONE LOSS: ICD-10-CM

## 2021-05-06 DIAGNOSIS — Z85.038 HISTORY OF COLON CANCER: ICD-10-CM

## 2021-05-06 DIAGNOSIS — Z79.4 CONTROLLED TYPE 2 DIABETES MELLITUS WITHOUT COMPLICATION, WITH LONG-TERM CURRENT USE OF INSULIN: ICD-10-CM

## 2021-05-06 DIAGNOSIS — E78.5 HYPERLIPIDEMIA, UNSPECIFIED HYPERLIPIDEMIA TYPE: ICD-10-CM

## 2021-05-06 DIAGNOSIS — E78.5 HYPERLIPIDEMIA ASSOCIATED WITH TYPE 2 DIABETES MELLITUS: ICD-10-CM

## 2021-05-06 DIAGNOSIS — E55.9 MILD VITAMIN D DEFICIENCY: ICD-10-CM

## 2021-05-06 DIAGNOSIS — E78.00 ELEVATED LDL CHOLESTEROL LEVEL: ICD-10-CM

## 2021-05-06 DIAGNOSIS — E11.9 CONTROLLED TYPE 2 DIABETES MELLITUS WITHOUT COMPLICATION, WITH LONG-TERM CURRENT USE OF INSULIN: Primary | ICD-10-CM

## 2021-05-06 DIAGNOSIS — E11.9 CONTROLLED TYPE 2 DIABETES MELLITUS WITHOUT COMPLICATION, WITH LONG-TERM CURRENT USE OF INSULIN: ICD-10-CM

## 2021-05-06 LAB
25(OH)D3+25(OH)D2 SERPL-MCNC: 60 NG/ML (ref 30–96)
ALBUMIN SERPL BCP-MCNC: 4.1 G/DL (ref 3.5–5.2)
ALBUMIN/CREAT UR: 7.1 UG/MG (ref 0–30)
ALP SERPL-CCNC: 70 U/L (ref 55–135)
ALT SERPL W/O P-5'-P-CCNC: 12 U/L (ref 10–44)
ANION GAP SERPL CALC-SCNC: 9 MMOL/L (ref 8–16)
AST SERPL-CCNC: 17 U/L (ref 10–40)
BASOPHILS # BLD AUTO: 0.04 K/UL (ref 0–0.2)
BASOPHILS NFR BLD: 1 % (ref 0–1.9)
BILIRUB SERPL-MCNC: 2.1 MG/DL (ref 0.1–1)
BUN SERPL-MCNC: 10 MG/DL (ref 8–23)
CALCIUM SERPL-MCNC: 10 MG/DL (ref 8.7–10.5)
CEA SERPL-MCNC: 1.8 NG/ML (ref 0–5)
CHLORIDE SERPL-SCNC: 107 MMOL/L (ref 95–110)
CHOLEST SERPL-MCNC: 135 MG/DL (ref 120–199)
CHOLEST/HDLC SERPL: 2.6 {RATIO} (ref 2–5)
CO2 SERPL-SCNC: 28 MMOL/L (ref 23–29)
CREAT SERPL-MCNC: 0.8 MG/DL (ref 0.5–1.4)
CREAT UR-MCNC: 252 MG/DL (ref 15–325)
DIFFERENTIAL METHOD: ABNORMAL
EOSINOPHIL # BLD AUTO: 0.1 K/UL (ref 0–0.5)
EOSINOPHIL NFR BLD: 2.9 % (ref 0–8)
ERYTHROCYTE [DISTWIDTH] IN BLOOD BY AUTOMATED COUNT: 12.4 % (ref 11.5–14.5)
EST. GFR  (AFRICAN AMERICAN): >60 ML/MIN/1.73 M^2
EST. GFR  (NON AFRICAN AMERICAN): >60 ML/MIN/1.73 M^2
ESTIMATED AVG GLUCOSE: 128 MG/DL (ref 68–131)
GLUCOSE SERPL-MCNC: 124 MG/DL (ref 70–110)
HBA1C MFR BLD: 6.1 % (ref 4–5.6)
HCT VFR BLD AUTO: 37.3 % (ref 37–48.5)
HDLC SERPL-MCNC: 51 MG/DL (ref 40–75)
HDLC SERPL: 37.8 % (ref 20–50)
HGB BLD-MCNC: 12.6 G/DL (ref 12–16)
IMM GRANULOCYTES # BLD AUTO: 0.01 K/UL (ref 0–0.04)
IMM GRANULOCYTES NFR BLD AUTO: 0.2 % (ref 0–0.5)
LDLC SERPL CALC-MCNC: 66 MG/DL (ref 63–159)
LYMPHOCYTES # BLD AUTO: 1.2 K/UL (ref 1–4.8)
LYMPHOCYTES NFR BLD: 27.6 % (ref 18–48)
MCH RBC QN AUTO: 33 PG (ref 27–31)
MCHC RBC AUTO-ENTMCNC: 33.8 G/DL (ref 32–36)
MCV RBC AUTO: 98 FL (ref 82–98)
MICROALBUMIN UR DL<=1MG/L-MCNC: 18 UG/ML
MONOCYTES # BLD AUTO: 0.3 K/UL (ref 0.3–1)
MONOCYTES NFR BLD: 7.6 % (ref 4–15)
NEUTROPHILS # BLD AUTO: 2.6 K/UL (ref 1.8–7.7)
NEUTROPHILS NFR BLD: 60.7 % (ref 38–73)
NONHDLC SERPL-MCNC: 84 MG/DL
NRBC BLD-RTO: 0 /100 WBC
PLATELET # BLD AUTO: 182 K/UL (ref 150–450)
PMV BLD AUTO: 11.8 FL (ref 9.2–12.9)
POTASSIUM SERPL-SCNC: 3.9 MMOL/L (ref 3.5–5.1)
PROT SERPL-MCNC: 7.2 G/DL (ref 6–8.4)
RBC # BLD AUTO: 3.82 M/UL (ref 4–5.4)
SODIUM SERPL-SCNC: 144 MMOL/L (ref 136–145)
TRIGL SERPL-MCNC: 90 MG/DL (ref 30–150)
WBC # BLD AUTO: 4.21 K/UL (ref 3.9–12.7)

## 2021-05-06 PROCEDURE — 99499 RISK ADDL DX/OHS AUDIT: ICD-10-PCS | Mod: S$GLB,,, | Performed by: INTERNAL MEDICINE

## 2021-05-06 PROCEDURE — 82570 ASSAY OF URINE CREATININE: CPT | Performed by: INTERNAL MEDICINE

## 2021-05-06 PROCEDURE — 1126F AMNT PAIN NOTED NONE PRSNT: CPT | Mod: S$GLB,,, | Performed by: INTERNAL MEDICINE

## 2021-05-06 PROCEDURE — 82043 UR ALBUMIN QUANTITATIVE: CPT | Performed by: INTERNAL MEDICINE

## 2021-05-06 PROCEDURE — 99999 PR PBB SHADOW E&M-EST. PATIENT-LVL IV: CPT | Mod: PBBFAC,,, | Performed by: INTERNAL MEDICINE

## 2021-05-06 PROCEDURE — 36415 COLL VENOUS BLD VENIPUNCTURE: CPT | Performed by: INTERNAL MEDICINE

## 2021-05-06 PROCEDURE — 1159F PR MEDICATION LIST DOCUMENTED IN MEDICAL RECORD: ICD-10-PCS | Mod: S$GLB,,, | Performed by: INTERNAL MEDICINE

## 2021-05-06 PROCEDURE — 85025 COMPLETE CBC W/AUTO DIFF WBC: CPT | Performed by: INTERNAL MEDICINE

## 2021-05-06 PROCEDURE — 99499 UNLISTED E&M SERVICE: CPT | Mod: S$GLB,,, | Performed by: INTERNAL MEDICINE

## 2021-05-06 PROCEDURE — 3288F FALL RISK ASSESSMENT DOCD: CPT | Mod: CPTII,S$GLB,, | Performed by: INTERNAL MEDICINE

## 2021-05-06 PROCEDURE — 80053 COMPREHEN METABOLIC PANEL: CPT | Performed by: INTERNAL MEDICINE

## 2021-05-06 PROCEDURE — 1126F PR PAIN SEVERITY QUANTIFIED, NO PAIN PRESENT: ICD-10-PCS | Mod: S$GLB,,, | Performed by: INTERNAL MEDICINE

## 2021-05-06 PROCEDURE — 1101F PR PT FALLS ASSESS DOC 0-1 FALLS W/OUT INJ PAST YR: ICD-10-PCS | Mod: CPTII,S$GLB,, | Performed by: INTERNAL MEDICINE

## 2021-05-06 PROCEDURE — 99214 PR OFFICE/OUTPT VISIT, EST, LEVL IV, 30-39 MIN: ICD-10-PCS | Mod: S$GLB,,, | Performed by: INTERNAL MEDICINE

## 2021-05-06 PROCEDURE — 83036 HEMOGLOBIN GLYCOSYLATED A1C: CPT | Performed by: INTERNAL MEDICINE

## 2021-05-06 PROCEDURE — 3288F PR FALLS RISK ASSESSMENT DOCUMENTED: ICD-10-PCS | Mod: CPTII,S$GLB,, | Performed by: INTERNAL MEDICINE

## 2021-05-06 PROCEDURE — 82306 VITAMIN D 25 HYDROXY: CPT | Performed by: INTERNAL MEDICINE

## 2021-05-06 PROCEDURE — 80061 LIPID PANEL: CPT | Performed by: INTERNAL MEDICINE

## 2021-05-06 PROCEDURE — 99999 PR PBB SHADOW E&M-EST. PATIENT-LVL IV: ICD-10-PCS | Mod: PBBFAC,,, | Performed by: INTERNAL MEDICINE

## 2021-05-06 PROCEDURE — 1101F PT FALLS ASSESS-DOCD LE1/YR: CPT | Mod: CPTII,S$GLB,, | Performed by: INTERNAL MEDICINE

## 2021-05-06 PROCEDURE — 1159F MED LIST DOCD IN RCRD: CPT | Mod: S$GLB,,, | Performed by: INTERNAL MEDICINE

## 2021-05-06 PROCEDURE — 99214 OFFICE O/P EST MOD 30 MIN: CPT | Mod: S$GLB,,, | Performed by: INTERNAL MEDICINE

## 2021-05-06 PROCEDURE — 82378 CARCINOEMBRYONIC ANTIGEN: CPT | Performed by: INTERNAL MEDICINE

## 2021-05-06 RX ORDER — DEXTROSE 4 G
TABLET,CHEWABLE ORAL
Qty: 1 EACH | Refills: 0 | Status: SHIPPED | OUTPATIENT
Start: 2021-05-06 | End: 2023-01-27

## 2021-05-06 RX ORDER — LANCETS
EACH MISCELLANEOUS
Qty: 180 EACH | Refills: 3 | Status: SHIPPED | OUTPATIENT
Start: 2021-05-06 | End: 2023-01-27

## 2021-05-06 RX ORDER — IBUPROFEN 200 MG
CAPSULE ORAL
Qty: 180 STRIP | Refills: 3 | Status: SHIPPED | OUTPATIENT
Start: 2021-05-06 | End: 2023-01-27

## 2021-05-20 DIAGNOSIS — F41.9 ANXIETY: ICD-10-CM

## 2021-05-21 ENCOUNTER — TELEPHONE (OUTPATIENT)
Dept: INTERNAL MEDICINE | Facility: CLINIC | Age: 76
End: 2021-05-21

## 2021-05-21 RX ORDER — LORAZEPAM 1 MG/1
TABLET ORAL
Qty: 30 TABLET | Refills: 1 | Status: SHIPPED | OUTPATIENT
Start: 2021-05-21 | End: 2021-08-16

## 2021-06-14 LAB — NONINV COLON CA DNA+OCC BLD SCRN STL QL: NEGATIVE

## 2021-06-17 LAB — NONINV COLON CA DNA+OCC BLD SCRN STL QL: NEGATIVE

## 2021-06-22 ENCOUNTER — PATIENT MESSAGE (OUTPATIENT)
Dept: ADMINISTRATIVE | Facility: HOSPITAL | Age: 76
End: 2021-06-22

## 2021-06-22 ENCOUNTER — PATIENT OUTREACH (OUTPATIENT)
Dept: ADMINISTRATIVE | Facility: HOSPITAL | Age: 76
End: 2021-06-22

## 2021-06-22 ENCOUNTER — TELEPHONE (OUTPATIENT)
Dept: ADMINISTRATIVE | Facility: HOSPITAL | Age: 76
End: 2021-06-22

## 2021-07-27 RX ORDER — ATORVASTATIN CALCIUM 10 MG/1
10 TABLET, FILM COATED ORAL NIGHTLY
Qty: 90 TABLET | Refills: 0 | Status: SHIPPED | OUTPATIENT
Start: 2021-07-27 | End: 2021-11-01

## 2021-07-28 ENCOUNTER — TELEPHONE (OUTPATIENT)
Dept: INTERNAL MEDICINE | Facility: CLINIC | Age: 76
End: 2021-07-28

## 2021-08-16 RX ORDER — ALBUTEROL SULFATE 90 UG/1
2 AEROSOL, METERED RESPIRATORY (INHALATION) EVERY 6 HOURS PRN
Qty: 18 G | Refills: 3 | Status: SHIPPED | OUTPATIENT
Start: 2021-08-16 | End: 2022-12-13 | Stop reason: SDUPTHER

## 2021-10-18 ENCOUNTER — PES CALL (OUTPATIENT)
Dept: ADMINISTRATIVE | Facility: CLINIC | Age: 76
End: 2021-10-18

## 2021-11-08 ENCOUNTER — IMMUNIZATION (OUTPATIENT)
Dept: INTERNAL MEDICINE | Facility: CLINIC | Age: 76
End: 2021-11-08
Payer: MEDICARE

## 2021-11-08 ENCOUNTER — OFFICE VISIT (OUTPATIENT)
Dept: INTERNAL MEDICINE | Facility: CLINIC | Age: 76
End: 2021-11-08
Payer: MEDICARE

## 2021-11-08 VITALS
HEIGHT: 62 IN | OXYGEN SATURATION: 97 % | SYSTOLIC BLOOD PRESSURE: 118 MMHG | DIASTOLIC BLOOD PRESSURE: 70 MMHG | RESPIRATION RATE: 16 BRPM | BODY MASS INDEX: 28.64 KG/M2 | WEIGHT: 155.63 LBS | HEART RATE: 79 BPM

## 2021-11-08 DIAGNOSIS — Z86.000 HISTORY OF DUCTAL CARCINOMA IN SITU (DCIS) OF BREAST: ICD-10-CM

## 2021-11-08 DIAGNOSIS — D12.6 COLON ADENOMAS: ICD-10-CM

## 2021-11-08 DIAGNOSIS — E78.5 HYPERLIPIDEMIA, UNSPECIFIED HYPERLIPIDEMIA TYPE: ICD-10-CM

## 2021-11-08 DIAGNOSIS — K21.9 GASTROESOPHAGEAL REFLUX DISEASE WITHOUT ESOPHAGITIS: ICD-10-CM

## 2021-11-08 DIAGNOSIS — R92.30 DENSE BREAST: Primary | ICD-10-CM

## 2021-11-08 DIAGNOSIS — G47.09 OTHER INSOMNIA: ICD-10-CM

## 2021-11-08 DIAGNOSIS — J45.20 MILD INTERMITTENT ASTHMA WITHOUT COMPLICATION: ICD-10-CM

## 2021-11-08 DIAGNOSIS — E11.9 CONTROLLED TYPE 2 DIABETES MELLITUS WITHOUT COMPLICATION, WITHOUT LONG-TERM CURRENT USE OF INSULIN: ICD-10-CM

## 2021-11-08 DIAGNOSIS — Z12.83 SCREENING EXAM FOR SKIN CANCER: ICD-10-CM

## 2021-11-08 DIAGNOSIS — Z98.890 HISTORY OF BIOPSY: ICD-10-CM

## 2021-11-08 DIAGNOSIS — Z85.038 HISTORY OF COLON CANCER: ICD-10-CM

## 2021-11-08 PROBLEM — E11.69 HYPERLIPIDEMIA ASSOCIATED WITH TYPE 2 DIABETES MELLITUS: Status: RESOLVED | Noted: 2020-11-12 | Resolved: 2021-11-08

## 2021-11-08 PROCEDURE — 1101F PT FALLS ASSESS-DOCD LE1/YR: CPT | Mod: CPTII,S$GLB,, | Performed by: INTERNAL MEDICINE

## 2021-11-08 PROCEDURE — G0008 ADMIN INFLUENZA VIRUS VAC: HCPCS | Mod: S$GLB,,, | Performed by: INTERNAL MEDICINE

## 2021-11-08 PROCEDURE — 90694 FLU VACCINE - QUADRIVALENT - ADJUVANTED: ICD-10-PCS | Mod: S$GLB,,, | Performed by: INTERNAL MEDICINE

## 2021-11-08 PROCEDURE — 1126F AMNT PAIN NOTED NONE PRSNT: CPT | Mod: CPTII,S$GLB,, | Performed by: INTERNAL MEDICINE

## 2021-11-08 PROCEDURE — 3078F PR MOST RECENT DIASTOLIC BLOOD PRESSURE < 80 MM HG: ICD-10-PCS | Mod: CPTII,S$GLB,, | Performed by: INTERNAL MEDICINE

## 2021-11-08 PROCEDURE — 3074F PR MOST RECENT SYSTOLIC BLOOD PRESSURE < 130 MM HG: ICD-10-PCS | Mod: CPTII,S$GLB,, | Performed by: INTERNAL MEDICINE

## 2021-11-08 PROCEDURE — 99499 UNLISTED E&M SERVICE: CPT | Mod: S$GLB,,, | Performed by: INTERNAL MEDICINE

## 2021-11-08 PROCEDURE — 99999 PR PBB SHADOW E&M-EST. PATIENT-LVL V: ICD-10-PCS | Mod: PBBFAC,,, | Performed by: INTERNAL MEDICINE

## 2021-11-08 PROCEDURE — 3078F DIAST BP <80 MM HG: CPT | Mod: CPTII,S$GLB,, | Performed by: INTERNAL MEDICINE

## 2021-11-08 PROCEDURE — 1101F PR PT FALLS ASSESS DOC 0-1 FALLS W/OUT INJ PAST YR: ICD-10-PCS | Mod: CPTII,S$GLB,, | Performed by: INTERNAL MEDICINE

## 2021-11-08 PROCEDURE — 3074F SYST BP LT 130 MM HG: CPT | Mod: CPTII,S$GLB,, | Performed by: INTERNAL MEDICINE

## 2021-11-08 PROCEDURE — 90694 VACC AIIV4 NO PRSRV 0.5ML IM: CPT | Mod: S$GLB,,, | Performed by: INTERNAL MEDICINE

## 2021-11-08 PROCEDURE — 1126F PR PAIN SEVERITY QUANTIFIED, NO PAIN PRESENT: ICD-10-PCS | Mod: CPTII,S$GLB,, | Performed by: INTERNAL MEDICINE

## 2021-11-08 PROCEDURE — 99214 PR OFFICE/OUTPT VISIT, EST, LEVL IV, 30-39 MIN: ICD-10-PCS | Mod: S$GLB,,, | Performed by: INTERNAL MEDICINE

## 2021-11-08 PROCEDURE — 99214 OFFICE O/P EST MOD 30 MIN: CPT | Mod: S$GLB,,, | Performed by: INTERNAL MEDICINE

## 2021-11-08 PROCEDURE — G0008 FLU VACCINE - QUADRIVALENT - ADJUVANTED: ICD-10-PCS | Mod: S$GLB,,, | Performed by: INTERNAL MEDICINE

## 2021-11-08 PROCEDURE — 99499 RISK ADDL DX/OHS AUDIT: ICD-10-PCS | Mod: S$GLB,,, | Performed by: INTERNAL MEDICINE

## 2021-11-08 PROCEDURE — 1159F PR MEDICATION LIST DOCUMENTED IN MEDICAL RECORD: ICD-10-PCS | Mod: CPTII,S$GLB,, | Performed by: INTERNAL MEDICINE

## 2021-11-08 PROCEDURE — 1159F MED LIST DOCD IN RCRD: CPT | Mod: CPTII,S$GLB,, | Performed by: INTERNAL MEDICINE

## 2021-11-08 PROCEDURE — 3288F PR FALLS RISK ASSESSMENT DOCUMENTED: ICD-10-PCS | Mod: CPTII,S$GLB,, | Performed by: INTERNAL MEDICINE

## 2021-11-08 PROCEDURE — 99999 PR PBB SHADOW E&M-EST. PATIENT-LVL V: CPT | Mod: PBBFAC,,, | Performed by: INTERNAL MEDICINE

## 2021-11-08 PROCEDURE — 3288F FALL RISK ASSESSMENT DOCD: CPT | Mod: CPTII,S$GLB,, | Performed by: INTERNAL MEDICINE

## 2021-11-08 RX ORDER — ATORVASTATIN CALCIUM 10 MG/1
10 TABLET, FILM COATED ORAL NIGHTLY
Qty: 90 TABLET | Refills: 1 | Status: SHIPPED | OUTPATIENT
Start: 2021-11-08 | End: 2022-01-28 | Stop reason: SDUPTHER

## 2021-11-08 RX ORDER — PANTOPRAZOLE SODIUM 20 MG/1
20 TABLET, DELAYED RELEASE ORAL DAILY
Qty: 90 TABLET | Refills: 3 | Status: SHIPPED | OUTPATIENT
Start: 2021-11-08 | End: 2021-12-30 | Stop reason: SDUPTHER

## 2021-11-09 ENCOUNTER — LAB VISIT (OUTPATIENT)
Dept: LAB | Facility: HOSPITAL | Age: 76
End: 2021-11-09
Attending: INTERNAL MEDICINE
Payer: MEDICARE

## 2021-11-09 DIAGNOSIS — E11.9 CONTROLLED TYPE 2 DIABETES MELLITUS WITHOUT COMPLICATION, WITHOUT LONG-TERM CURRENT USE OF INSULIN: ICD-10-CM

## 2021-11-09 LAB
ALBUMIN SERPL BCP-MCNC: 3.8 G/DL (ref 3.5–5.2)
ALP SERPL-CCNC: 65 U/L (ref 55–135)
ALT SERPL W/O P-5'-P-CCNC: 12 U/L (ref 10–44)
ANION GAP SERPL CALC-SCNC: 9 MMOL/L (ref 8–16)
AST SERPL-CCNC: 17 U/L (ref 10–40)
BASOPHILS # BLD AUTO: 0.04 K/UL (ref 0–0.2)
BASOPHILS NFR BLD: 0.9 % (ref 0–1.9)
BILIRUB SERPL-MCNC: 1.4 MG/DL (ref 0.1–1)
BUN SERPL-MCNC: 12 MG/DL (ref 8–23)
CALCIUM SERPL-MCNC: 9.7 MG/DL (ref 8.7–10.5)
CHLORIDE SERPL-SCNC: 106 MMOL/L (ref 95–110)
CO2 SERPL-SCNC: 25 MMOL/L (ref 23–29)
CREAT SERPL-MCNC: 0.7 MG/DL (ref 0.5–1.4)
DIFFERENTIAL METHOD: ABNORMAL
EOSINOPHIL # BLD AUTO: 0.2 K/UL (ref 0–0.5)
EOSINOPHIL NFR BLD: 3.5 % (ref 0–8)
ERYTHROCYTE [DISTWIDTH] IN BLOOD BY AUTOMATED COUNT: 12.4 % (ref 11.5–14.5)
EST. GFR  (AFRICAN AMERICAN): >60 ML/MIN/1.73 M^2
EST. GFR  (NON AFRICAN AMERICAN): >60 ML/MIN/1.73 M^2
ESTIMATED AVG GLUCOSE: 128 MG/DL (ref 68–131)
GLUCOSE SERPL-MCNC: 145 MG/DL (ref 70–110)
HBA1C MFR BLD: 6.1 % (ref 4–5.6)
HCT VFR BLD AUTO: 35 % (ref 37–48.5)
HGB BLD-MCNC: 11.2 G/DL (ref 12–16)
IMM GRANULOCYTES # BLD AUTO: 0.01 K/UL (ref 0–0.04)
IMM GRANULOCYTES NFR BLD AUTO: 0.2 % (ref 0–0.5)
LYMPHOCYTES # BLD AUTO: 1.2 K/UL (ref 1–4.8)
LYMPHOCYTES NFR BLD: 26.8 % (ref 18–48)
MCH RBC QN AUTO: 33 PG (ref 27–31)
MCHC RBC AUTO-ENTMCNC: 32 G/DL (ref 32–36)
MCV RBC AUTO: 103 FL (ref 82–98)
MONOCYTES # BLD AUTO: 0.3 K/UL (ref 0.3–1)
MONOCYTES NFR BLD: 6.8 % (ref 4–15)
NEUTROPHILS # BLD AUTO: 2.8 K/UL (ref 1.8–7.7)
NEUTROPHILS NFR BLD: 61.8 % (ref 38–73)
NRBC BLD-RTO: 0 /100 WBC
PLATELET # BLD AUTO: 158 K/UL (ref 150–450)
PMV BLD AUTO: 11.6 FL (ref 9.2–12.9)
POTASSIUM SERPL-SCNC: 4 MMOL/L (ref 3.5–5.1)
PROT SERPL-MCNC: 6.6 G/DL (ref 6–8.4)
RBC # BLD AUTO: 3.39 M/UL (ref 4–5.4)
SODIUM SERPL-SCNC: 140 MMOL/L (ref 136–145)
WBC # BLD AUTO: 4.56 K/UL (ref 3.9–12.7)

## 2021-11-09 PROCEDURE — 80053 COMPREHEN METABOLIC PANEL: CPT | Performed by: INTERNAL MEDICINE

## 2021-11-09 PROCEDURE — 82728 ASSAY OF FERRITIN: CPT | Performed by: INTERNAL MEDICINE

## 2021-11-09 PROCEDURE — 36415 COLL VENOUS BLD VENIPUNCTURE: CPT | Mod: PO | Performed by: INTERNAL MEDICINE

## 2021-11-09 PROCEDURE — 84466 ASSAY OF TRANSFERRIN: CPT | Performed by: INTERNAL MEDICINE

## 2021-11-09 PROCEDURE — 85025 COMPLETE CBC W/AUTO DIFF WBC: CPT | Performed by: INTERNAL MEDICINE

## 2021-11-09 PROCEDURE — 83036 HEMOGLOBIN GLYCOSYLATED A1C: CPT | Performed by: INTERNAL MEDICINE

## 2021-11-10 ENCOUNTER — TELEPHONE (OUTPATIENT)
Dept: INTERNAL MEDICINE | Facility: CLINIC | Age: 76
End: 2021-11-10
Payer: MEDICARE

## 2021-11-10 DIAGNOSIS — E53.8 B12 NUTRITIONAL DEFICIENCY: ICD-10-CM

## 2021-11-10 DIAGNOSIS — D64.9 ANEMIA, UNSPECIFIED TYPE: Primary | ICD-10-CM

## 2021-11-10 LAB
FERRITIN SERPL-MCNC: 186 NG/ML (ref 20–300)
IRON SERPL-MCNC: 93 UG/DL (ref 30–160)
SATURATED IRON: 26 % (ref 20–50)
TOTAL IRON BINDING CAPACITY: 364 UG/DL (ref 250–450)
TRANSFERRIN SERPL-MCNC: 246 MG/DL (ref 200–375)

## 2021-12-08 ENCOUNTER — TELEPHONE (OUTPATIENT)
Dept: INTERNAL MEDICINE | Facility: CLINIC | Age: 76
End: 2021-12-08
Payer: MEDICARE

## 2021-12-17 ENCOUNTER — IMMUNIZATION (OUTPATIENT)
Dept: PRIMARY CARE CLINIC | Facility: CLINIC | Age: 76
End: 2021-12-17
Payer: MEDICARE

## 2021-12-17 DIAGNOSIS — Z23 NEED FOR VACCINATION: Primary | ICD-10-CM

## 2021-12-17 PROCEDURE — 0064A COVID-19, MRNA, LNP-S, PF, 100 MCG/0.25 ML DOSE VACCINE (MODERNA BOOSTER): CPT | Mod: CV19,PBBFAC | Performed by: INTERNAL MEDICINE

## 2021-12-30 DIAGNOSIS — K21.9 GASTROESOPHAGEAL REFLUX DISEASE WITHOUT ESOPHAGITIS: ICD-10-CM

## 2021-12-30 NOTE — TELEPHONE ENCOUNTER
No new care gaps identified.  Powered by [employer]. Reference number: [id number].   12/30/2021 9:58:04 AM CST

## 2021-12-30 NOTE — TELEPHONE ENCOUNTER
----- Message from Lauren Campbell sent at 12/30/2021  9:22 AM CST -----  Contact: [phone]  Requesting an RX refill or new RX.  Is this a refill or new RX: refill  RX name and strength: pantoprazole (PROTONIX) 20 MG tablet 90 tablet   Is this a 30 day or 90 day RX: 90  Patient advised that in the future they can use their [facility] account to request a refill?: yes  Pharmacy name and phone # :  [id number] - [place]  [address] AT [facility] [address]   Phone:  [phone]  Fax:  [phone]    Comments: Please call patient to confirm.

## 2022-01-01 RX ORDER — PANTOPRAZOLE SODIUM 20 MG/1
20 TABLET, DELAYED RELEASE ORAL DAILY
Qty: 90 TABLET | Refills: 3 | Status: SHIPPED | OUTPATIENT
Start: 2022-01-01 | End: 2023-01-03 | Stop reason: SDUPTHER

## 2022-01-10 ENCOUNTER — HOSPITAL ENCOUNTER (OUTPATIENT)
Dept: RADIOLOGY | Facility: HOSPITAL | Age: 77
Discharge: HOME | End: 2022-01-10
Attending: INTERNAL MEDICINE
Payer: MEDICARE

## 2022-01-10 VITALS — BODY MASS INDEX: 27.44 KG/M2 | WEIGHT: 150 LBS

## 2022-01-10 DIAGNOSIS — R92.30 DENSE BREAST: ICD-10-CM

## 2022-01-10 DIAGNOSIS — Z86.000 HISTORY OF DUCTAL CARCINOMA IN SITU (DCIS) OF BREAST: ICD-10-CM

## 2022-01-10 DIAGNOSIS — Z98.890 HISTORY OF BIOPSY: ICD-10-CM

## 2022-01-10 PROCEDURE — 77066 DX MAMMO INCL CAD BI: CPT | Mod: 26,,, | Performed by: RADIOLOGY

## 2022-01-10 PROCEDURE — 77062 MAMMO DIGITAL DIAGNOSTIC BILAT WITH TOMO: ICD-10-PCS | Mod: 26,,, | Performed by: RADIOLOGY

## 2022-01-10 PROCEDURE — 77062 BREAST TOMOSYNTHESIS BI: CPT | Mod: 26,,, | Performed by: RADIOLOGY

## 2022-01-10 PROCEDURE — 77066 DX MAMMO INCL CAD BI: CPT | Mod: TC

## 2022-01-10 PROCEDURE — 77066 MAMMO DIGITAL DIAGNOSTIC BILAT WITH TOMO: ICD-10-PCS | Mod: 26,,, | Performed by: RADIOLOGY

## 2022-01-24 ENCOUNTER — LAB VISIT (OUTPATIENT)
Dept: LAB | Facility: HOSPITAL | Age: 77
End: 2022-01-24
Attending: INTERNAL MEDICINE
Payer: MEDICARE

## 2022-01-24 ENCOUNTER — OFFICE VISIT (OUTPATIENT)
Dept: INTERNAL MEDICINE | Facility: CLINIC | Age: 77
End: 2022-01-24
Payer: MEDICARE

## 2022-01-24 VITALS
RESPIRATION RATE: 16 BRPM | DIASTOLIC BLOOD PRESSURE: 74 MMHG | SYSTOLIC BLOOD PRESSURE: 114 MMHG | OXYGEN SATURATION: 98 % | HEIGHT: 62 IN | WEIGHT: 158.75 LBS | BODY MASS INDEX: 29.21 KG/M2 | HEART RATE: 95 BPM

## 2022-01-24 DIAGNOSIS — D53.9 NUTRITIONAL ANEMIA, UNSPECIFIED: ICD-10-CM

## 2022-01-24 DIAGNOSIS — R07.9 CHEST PAIN, UNSPECIFIED TYPE: ICD-10-CM

## 2022-01-24 DIAGNOSIS — E78.5 HYPERLIPIDEMIA, UNSPECIFIED HYPERLIPIDEMIA TYPE: Primary | ICD-10-CM

## 2022-01-24 DIAGNOSIS — Z85.038 HISTORY OF COLON CANCER: ICD-10-CM

## 2022-01-24 DIAGNOSIS — R92.30 DENSE BREAST: ICD-10-CM

## 2022-01-24 DIAGNOSIS — D64.9 ANEMIA, UNSPECIFIED TYPE: ICD-10-CM

## 2022-01-24 DIAGNOSIS — R14.3 FLATUS: ICD-10-CM

## 2022-01-24 DIAGNOSIS — Z86.000 HISTORY OF DUCTAL CARCINOMA IN SITU (DCIS) OF BREAST: ICD-10-CM

## 2022-01-24 PROBLEM — J45.30 MILD PERSISTENT ASTHMA: Status: ACTIVE | Noted: 2020-08-19

## 2022-01-24 PROCEDURE — 1159F MED LIST DOCD IN RCRD: CPT | Mod: CPTII,S$GLB,, | Performed by: INTERNAL MEDICINE

## 2022-01-24 PROCEDURE — 1126F AMNT PAIN NOTED NONE PRSNT: CPT | Mod: CPTII,S$GLB,, | Performed by: INTERNAL MEDICINE

## 2022-01-24 PROCEDURE — 3074F PR MOST RECENT SYSTOLIC BLOOD PRESSURE < 130 MM HG: ICD-10-PCS | Mod: CPTII,S$GLB,, | Performed by: INTERNAL MEDICINE

## 2022-01-24 PROCEDURE — 82607 VITAMIN B-12: CPT | Performed by: INTERNAL MEDICINE

## 2022-01-24 PROCEDURE — 1101F PR PT FALLS ASSESS DOC 0-1 FALLS W/OUT INJ PAST YR: ICD-10-PCS | Mod: CPTII,S$GLB,, | Performed by: INTERNAL MEDICINE

## 2022-01-24 PROCEDURE — 3078F DIAST BP <80 MM HG: CPT | Mod: CPTII,S$GLB,, | Performed by: INTERNAL MEDICINE

## 2022-01-24 PROCEDURE — 1101F PT FALLS ASSESS-DOCD LE1/YR: CPT | Mod: CPTII,S$GLB,, | Performed by: INTERNAL MEDICINE

## 2022-01-24 PROCEDURE — 1160F PR REVIEW ALL MEDS BY PRESCRIBER/CLIN PHARMACIST DOCUMENTED: ICD-10-PCS | Mod: CPTII,S$GLB,, | Performed by: INTERNAL MEDICINE

## 2022-01-24 PROCEDURE — 3074F SYST BP LT 130 MM HG: CPT | Mod: CPTII,S$GLB,, | Performed by: INTERNAL MEDICINE

## 2022-01-24 PROCEDURE — 99215 PR OFFICE/OUTPT VISIT, EST, LEVL V, 40-54 MIN: ICD-10-PCS | Mod: S$GLB,,, | Performed by: INTERNAL MEDICINE

## 2022-01-24 PROCEDURE — 99215 OFFICE O/P EST HI 40 MIN: CPT | Mod: S$GLB,,, | Performed by: INTERNAL MEDICINE

## 2022-01-24 PROCEDURE — 1126F PR PAIN SEVERITY QUANTIFIED, NO PAIN PRESENT: ICD-10-PCS | Mod: CPTII,S$GLB,, | Performed by: INTERNAL MEDICINE

## 2022-01-24 PROCEDURE — 3078F PR MOST RECENT DIASTOLIC BLOOD PRESSURE < 80 MM HG: ICD-10-PCS | Mod: CPTII,S$GLB,, | Performed by: INTERNAL MEDICINE

## 2022-01-24 PROCEDURE — 99999 PR PBB SHADOW E&M-EST. PATIENT-LVL IV: ICD-10-PCS | Mod: PBBFAC,,, | Performed by: INTERNAL MEDICINE

## 2022-01-24 PROCEDURE — 85025 COMPLETE CBC W/AUTO DIFF WBC: CPT | Performed by: INTERNAL MEDICINE

## 2022-01-24 PROCEDURE — 93010 EKG 12-LEAD: ICD-10-PCS | Mod: S$GLB,,, | Performed by: INTERNAL MEDICINE

## 2022-01-24 PROCEDURE — 36415 COLL VENOUS BLD VENIPUNCTURE: CPT | Mod: PO | Performed by: INTERNAL MEDICINE

## 2022-01-24 PROCEDURE — 93005 EKG 12-LEAD: ICD-10-PCS | Mod: S$GLB,,, | Performed by: INTERNAL MEDICINE

## 2022-01-24 PROCEDURE — 93005 ELECTROCARDIOGRAM TRACING: CPT | Mod: S$GLB,,, | Performed by: INTERNAL MEDICINE

## 2022-01-24 PROCEDURE — 3288F PR FALLS RISK ASSESSMENT DOCUMENTED: ICD-10-PCS | Mod: CPTII,S$GLB,, | Performed by: INTERNAL MEDICINE

## 2022-01-24 PROCEDURE — 3288F FALL RISK ASSESSMENT DOCD: CPT | Mod: CPTII,S$GLB,, | Performed by: INTERNAL MEDICINE

## 2022-01-24 PROCEDURE — 1159F PR MEDICATION LIST DOCUMENTED IN MEDICAL RECORD: ICD-10-PCS | Mod: CPTII,S$GLB,, | Performed by: INTERNAL MEDICINE

## 2022-01-24 PROCEDURE — 93010 ELECTROCARDIOGRAM REPORT: CPT | Mod: S$GLB,,, | Performed by: INTERNAL MEDICINE

## 2022-01-24 PROCEDURE — 82746 ASSAY OF FOLIC ACID SERUM: CPT | Performed by: INTERNAL MEDICINE

## 2022-01-24 PROCEDURE — 1160F RVW MEDS BY RX/DR IN RCRD: CPT | Mod: CPTII,S$GLB,, | Performed by: INTERNAL MEDICINE

## 2022-01-24 PROCEDURE — 99999 PR PBB SHADOW E&M-EST. PATIENT-LVL IV: CPT | Mod: PBBFAC,,, | Performed by: INTERNAL MEDICINE

## 2022-01-24 NOTE — PROGRESS NOTES
Subjective:       Patient ID: Winnie Ngo is a 76 y.o. female.    Chief Complaint: Establish Care    HPI 76-year-old female presents to clinic today for follow-up of chronic medical conditions and get established as a new patient.  She has a former history of colon cancer and ductal carcinoma insight 2 of the breast she is enquiring whether not she could also get enhance breast cancer screening sees given she also had a negative core biopsy last year.  Also on her blood work in November with her previous PCP she had evidence of a new onset anemia iron workup was negative.  She has opted out of colonoscopy last 1 performed in 2013 due to difficulty with prep.  She had a Cologuard performed last that was negative.  She is aware that this is not the ideal screening back of for her.  She is also requesting what she thinks is called Accu view is a blood sugar glucometer.  Patient also reports she experiences some occasional discomfort in her chest she feels like it is like gas and it is alleviated when she belches.  Review of Systems    otherwise negative  Objective:      Physical Exam  General: Well-appearing, well-nourished.  No distress  HEENT: conjunctivae are normal.  Pupils are equal and reative to light.  TM's are clear and intact bilaterally.  Hearing is grossly normal.  Nasopharynx is clear.  Oropharynx is clear.  Neck: Supple.  No thyroid megaly.  No bruits.  Lymph: No cervical or supraclavicular adenopathy.  Heart: Regular rate and rhythm, without murmur, rub or gallop.  Lungs: Clear to auscultation; respiratory effort normal.  Abdomen: Soft, nontender, nondistended.  Normoactive bowel sounds.  No hepatomegaly.  No masses.  Extremities: Good distal pulses.  No edema.  Psych: Oriented to time person place.  Judgment and insight seem unimpaired.  Mood and affect are appropriate.  Assessment:       Problem List Items Addressed This Visit     Hyperlipidemia: without diabetes ASCVD Risk 15.5%   with diabetes  27.9% ( elevated glucoses) - Primary    Relevant Orders    IN OFFICE EKG 12-LEAD (to [place])    History of colon cancer    History of ductal carcinoma in situ (DCIS) of breast    Relevant Orders    MRI Breast Bilateral W WO Contrast      Other Visit Diagnoses     Anemia, unspecified type        Relevant Orders    Vitamin B12    Folate    CBC Auto Differential    Urinalysis (Completed)    Dense breast        Relevant Orders    MRI Breast Bilateral W WO Contrast    Nutritional anemia, unspecified         Relevant Orders    Vitamin B12    Folate    Flatus        Chest pain, unspecified type        Relevant Orders    IN OFFICE EKG 12-LEAD (to [place])          Plan:       Wninie was seen today for establish care.    Diagnoses and all orders for this visit:    Hyperlipidemia, unspecified hyperlipidemia type  -     IN OFFICE EKG 12-LEAD (to Muse)  Family history of heart disease in her father.  Discussed cardiac testing and stress testing she would like to hold off on this.  ED precautions given  History of colon cancer  Counseled on recommendations for colonoscopy.  If anemia workup is inconclusive may need to proceed with colonoscopy for further evaluation.  History of ductal carcinoma in situ (DCIS) of breast  -     MRI Breast Bilateral W WO Contrast; Future    Anemia, unspecified type  -     Vitamin B12; Future  -     Folate; Future  -     CBC Auto Differential; Future  -     Urinalysis; Future    Dense breast  -     MRI Breast Bilateral W WO Contrast; Future    Nutritional anemia, unspecified   -     Vitamin B12; Future  -     Folate; Future    Flatus  Gas-X as needed  Chest pain, unspecified type  -     IN OFFICE EKG 12-LEAD (to Muse)  See above

## 2022-01-25 ENCOUNTER — PES CALL (OUTPATIENT)
Dept: ADMINISTRATIVE | Facility: CLINIC | Age: 77
End: 2022-01-25
Payer: MEDICARE

## 2022-01-25 LAB
BASOPHILS # BLD AUTO: 0.04 K/UL (ref 0–0.2)
BASOPHILS NFR BLD: 0.9 % (ref 0–1.9)
DIFFERENTIAL METHOD: ABNORMAL
EOSINOPHIL # BLD AUTO: 0.2 K/UL (ref 0–0.5)
EOSINOPHIL NFR BLD: 3.9 % (ref 0–8)
ERYTHROCYTE [DISTWIDTH] IN BLOOD BY AUTOMATED COUNT: 12 % (ref 11.5–14.5)
FOLATE SERPL-MCNC: 10.5 NG/ML (ref 4–24)
HCT VFR BLD AUTO: 38.5 % (ref 37–48.5)
HGB BLD-MCNC: 12.7 G/DL (ref 12–16)
IMM GRANULOCYTES # BLD AUTO: 0.01 K/UL (ref 0–0.04)
IMM GRANULOCYTES NFR BLD AUTO: 0.2 % (ref 0–0.5)
LYMPHOCYTES # BLD AUTO: 1.1 K/UL (ref 1–4.8)
LYMPHOCYTES NFR BLD: 25.7 % (ref 18–48)
MCH RBC QN AUTO: 32.6 PG (ref 27–31)
MCHC RBC AUTO-ENTMCNC: 33 G/DL (ref 32–36)
MCV RBC AUTO: 99 FL (ref 82–98)
MONOCYTES # BLD AUTO: 0.3 K/UL (ref 0.3–1)
MONOCYTES NFR BLD: 7.6 % (ref 4–15)
NEUTROPHILS # BLD AUTO: 2.7 K/UL (ref 1.8–7.7)
NEUTROPHILS NFR BLD: 61.7 % (ref 38–73)
NRBC BLD-RTO: 1 /100 WBC
PLATELET # BLD AUTO: 180 K/UL (ref 150–450)
PMV BLD AUTO: 11.8 FL (ref 9.2–12.9)
RBC # BLD AUTO: 3.89 M/UL (ref 4–5.4)
VIT B12 SERPL-MCNC: 488 PG/ML (ref 210–950)
WBC # BLD AUTO: 4.36 K/UL (ref 3.9–12.7)

## 2022-01-26 ENCOUNTER — TELEPHONE (OUTPATIENT)
Dept: INTERNAL MEDICINE | Facility: CLINIC | Age: 77
End: 2022-01-26
Payer: MEDICARE

## 2022-01-26 ENCOUNTER — HOSPITAL ENCOUNTER (OUTPATIENT)
Dept: RADIOLOGY | Facility: HOSPITAL | Age: 77
Discharge: HOME | End: 2022-01-26
Attending: INTERNAL MEDICINE
Payer: MEDICARE

## 2022-01-26 ENCOUNTER — PATIENT MESSAGE (OUTPATIENT)
Dept: INTERNAL MEDICINE | Facility: CLINIC | Age: 77
End: 2022-01-26
Payer: MEDICARE

## 2022-01-26 ENCOUNTER — PATIENT OUTREACH (OUTPATIENT)
Dept: ADMINISTRATIVE | Facility: OTHER | Age: 77
End: 2022-01-26
Payer: MEDICARE

## 2022-01-26 DIAGNOSIS — R94.31 ABNORMAL EKG: Primary | ICD-10-CM

## 2022-01-26 DIAGNOSIS — M81.0 POSTMENOPAUSAL BONE LOSS: ICD-10-CM

## 2022-01-26 DIAGNOSIS — R07.9 CHEST PAIN, UNSPECIFIED TYPE: ICD-10-CM

## 2022-01-26 PROCEDURE — 77080 DXA BONE DENSITY AXIAL: CPT | Mod: TC

## 2022-01-26 PROCEDURE — 77080 DEXA BONE DENSITY SPINE HIP: ICD-10-PCS | Mod: 26,,, | Performed by: STUDENT IN AN ORGANIZED HEALTH CARE EDUCATION/TRAINING PROGRAM

## 2022-01-26 PROCEDURE — 77080 DXA BONE DENSITY AXIAL: CPT | Mod: 26,,, | Performed by: STUDENT IN AN ORGANIZED HEALTH CARE EDUCATION/TRAINING PROGRAM

## 2022-01-26 NOTE — TELEPHONE ENCOUNTER
Contacted patient and scheduled cardiology and echo appointment. Informed her of her EKG results and the doctor recommendations. Patient verbalized understanding.

## 2022-01-26 NOTE — PROGRESS NOTES
Health Maintenance Due   Topic Date Due    Hepatitis C Screening  Never done    TETANUS VACCINE  06/04/2008    Foot Exam  10/21/2020     Updates were requested from care everywhere.  Chart was reviewed for overdue [facility] Encounters (ADAL) topics (CRS, Breast Cancer Screening, Eye exam)  Health Maintenance has been updated.  LINKS immunization registry triggered.  Immunizations were reconciled.

## 2022-01-26 NOTE — TELEPHONE ENCOUNTER
Please inform patient that after we had the EKG reviewed and compared to previous EKG that there was some abnormality in change seen that makes me more concerned about her chest pain report.  I would like for her to get a pharmacologic stress ECHO and schedule her an appointment with cardiologist Dr. Zamora.  Please tell her I think that this is important and that she needs to proceed with this diagnostic workup at this time.  This may not just be gas.

## 2022-01-27 ENCOUNTER — HOSPITAL ENCOUNTER (OUTPATIENT)
Dept: CARDIOLOGY | Facility: HOSPITAL | Age: 77
Discharge: HOME | End: 2022-01-27
Attending: INTERNAL MEDICINE
Payer: MEDICARE

## 2022-01-27 ENCOUNTER — OFFICE VISIT (OUTPATIENT)
Dept: CARDIOLOGY | Facility: CLINIC | Age: 77
End: 2022-01-27
Payer: MEDICARE

## 2022-01-27 VITALS
BODY MASS INDEX: 29.03 KG/M2 | OXYGEN SATURATION: 98 % | DIASTOLIC BLOOD PRESSURE: 79 MMHG | WEIGHT: 158.75 LBS | HEART RATE: 72 BPM | SYSTOLIC BLOOD PRESSURE: 146 MMHG

## 2022-01-27 VITALS — WEIGHT: 158 LBS | BODY MASS INDEX: 28.9 KG/M2

## 2022-01-27 DIAGNOSIS — R94.31 ABNORMAL EKG: ICD-10-CM

## 2022-01-27 DIAGNOSIS — R07.9 CHEST PAIN, UNSPECIFIED TYPE: ICD-10-CM

## 2022-01-27 DIAGNOSIS — R06.09 DOE (DYSPNEA ON EXERTION): Primary | ICD-10-CM

## 2022-01-27 LAB
CV STRESS BASE HR: 86 BPM
DIASTOLIC BLOOD PRESSURE: 72 MMHG
EJECTION FRACTION: 55 %
OHS CV CPX 1 MINUTE RECOVERY HEART RATE: 118 BPM
OHS CV CPX 85 PERCENT MAX PREDICTED HEART RATE MALE: 117
OHS CV CPX ESTIMATED METS: 1
OHS CV CPX MAX PREDICTED HEART RATE: 137
OHS CV CPX PATIENT IS FEMALE: 1
OHS CV CPX PATIENT IS MALE: 0
OHS CV CPX PEAK DIASTOLIC BLOOD PRESSURE: 59 MMHG
OHS CV CPX PEAK HEAR RATE: 137 BPM
OHS CV CPX PEAK RATE PRESSURE PRODUCT: NORMAL
OHS CV CPX PEAK SYSTOLIC BLOOD PRESSURE: 184 MMHG
OHS CV CPX PERCENT MAX PREDICTED HEART RATE ACHIEVED: 100
OHS CV CPX RATE PRESSURE PRODUCT PRESENTING: NORMAL
SYSTOLIC BLOOD PRESSURE: 155 MMHG

## 2022-01-27 PROCEDURE — 99205 OFFICE O/P NEW HI 60 MIN: CPT | Mod: S$GLB,,, | Performed by: INTERNAL MEDICINE

## 2022-01-27 PROCEDURE — 3078F DIAST BP <80 MM HG: CPT | Mod: CPTII,S$GLB,, | Performed by: INTERNAL MEDICINE

## 2022-01-27 PROCEDURE — 3078F PR MOST RECENT DIASTOLIC BLOOD PRESSURE < 80 MM HG: ICD-10-PCS | Mod: CPTII,S$GLB,, | Performed by: INTERNAL MEDICINE

## 2022-01-27 PROCEDURE — 99999 PR PBB SHADOW E&M-EST. PATIENT-LVL IV: ICD-10-PCS | Mod: PBBFAC,,, | Performed by: INTERNAL MEDICINE

## 2022-01-27 PROCEDURE — 1126F AMNT PAIN NOTED NONE PRSNT: CPT | Mod: CPTII,S$GLB,, | Performed by: INTERNAL MEDICINE

## 2022-01-27 PROCEDURE — 93351 STRESS TTE COMPLETE: CPT | Mod: 26,,, | Performed by: INTERNAL MEDICINE

## 2022-01-27 PROCEDURE — 1126F PR PAIN SEVERITY QUANTIFIED, NO PAIN PRESENT: ICD-10-PCS | Mod: CPTII,S$GLB,, | Performed by: INTERNAL MEDICINE

## 2022-01-27 PROCEDURE — 93351 STRESS ECHO (CUPID ONLY): ICD-10-PCS | Mod: 26,,, | Performed by: INTERNAL MEDICINE

## 2022-01-27 PROCEDURE — 1159F MED LIST DOCD IN RCRD: CPT | Mod: CPTII,S$GLB,, | Performed by: INTERNAL MEDICINE

## 2022-01-27 PROCEDURE — 63600175 PHARM REV CODE 636 W HCPCS: Performed by: NURSE PRACTITIONER

## 2022-01-27 PROCEDURE — 1160F PR REVIEW ALL MEDS BY PRESCRIBER/CLIN PHARMACIST DOCUMENTED: ICD-10-PCS | Mod: CPTII,S$GLB,, | Performed by: INTERNAL MEDICINE

## 2022-01-27 PROCEDURE — 3077F SYST BP >= 140 MM HG: CPT | Mod: CPTII,S$GLB,, | Performed by: INTERNAL MEDICINE

## 2022-01-27 PROCEDURE — 1159F PR MEDICATION LIST DOCUMENTED IN MEDICAL RECORD: ICD-10-PCS | Mod: CPTII,S$GLB,, | Performed by: INTERNAL MEDICINE

## 2022-01-27 PROCEDURE — 3077F PR MOST RECENT SYSTOLIC BLOOD PRESSURE >= 140 MM HG: ICD-10-PCS | Mod: CPTII,S$GLB,, | Performed by: INTERNAL MEDICINE

## 2022-01-27 PROCEDURE — 1160F RVW MEDS BY RX/DR IN RCRD: CPT | Mod: CPTII,S$GLB,, | Performed by: INTERNAL MEDICINE

## 2022-01-27 PROCEDURE — 93351 STRESS TTE COMPLETE: CPT

## 2022-01-27 PROCEDURE — 99205 PR OFFICE/OUTPT VISIT, NEW, LEVL V, 60-74 MIN: ICD-10-PCS | Mod: S$GLB,,, | Performed by: INTERNAL MEDICINE

## 2022-01-27 PROCEDURE — 99999 PR PBB SHADOW E&M-EST. PATIENT-LVL IV: CPT | Mod: PBBFAC,,, | Performed by: INTERNAL MEDICINE

## 2022-01-27 RX ORDER — DOBUTAMINE HYDROCHLORIDE 400 MG/100ML
10 INJECTION INTRAVENOUS CONTINUOUS
Status: DISCONTINUED | OUTPATIENT
Start: 2022-01-27 | End: 2023-03-06 | Stop reason: CLARIF

## 2022-01-27 RX ADMIN — DOBUTAMINE HYDROCHLORIDE 10 MCG/KG/MIN: 400 INJECTION INTRAVENOUS at 02:01

## 2022-01-27 NOTE — NURSING
1412 Pt on table ready for test. Allergies/ history confirmed. Connected to monitor EKG and NIBP. 22 g PIV started to left AC    1412 Allan, NP at bedside. Consent obtained    1414 Test started per protocol. See EKG sheet for VS.     1417 Dobutamine increased to 20 mcg to increase HR. Pt tolerating well.     1420 Target HR obtained.     1421 Test phase complete, dobutamine off, NS up at rapid rate for recovery phase.     1426 Recovery phase complete. Pt states feels normal, no distress, NS dc'd, 100ml infused, IV dc'd. Pt released to Cardio.

## 2022-01-27 NOTE — PROGRESS NOTES
Cardiology Clinic note    Subjective:   Patient ID:  Winnie Ngo is a 76 y.o. female who has been referred by Dr Campbell for an abnormal EKG    HPI:   Abnormal EKG: EKG 1/24/22 with ST-depression diffusely present, most prominent AL leads. Reports was not symptomatic at the time. Does endorse occasional CP with activity. Does endorse MOREJON.    HLD: Tolerating statin    SH Tobacco Never used  FH Father had MI at age 41    Patient Active Problem List    Diagnosis Date Noted    Insomnia 11/12/2020    Panic disorder (episodic paroxysmal anxiety) 11/12/2020    Overweight (BMI 25.0-29.9) 11/12/2020    Mild persistent asthma 08/19/2020    History of ductal carcinoma in situ (DCIS) of breast 08/19/2020 2016 lumpectomy       Bilateral dry eyes 03/13/2019    Nuclear sclerotic cataract of both eyes 03/13/2019    Abnormal gastrointestinal PET scan 06/21/2016    Osteoarthritis of left knee, severe 07/23/2013    Mild vitamin D deficiency 05/09/2013    History of colon cancer 11/20/2012     Sigmoid colectomy in 9/00 with Dr Samuel and current specialist Dr Way.  Last colonoscopy in 2013 and patient prefers Cologuard due to prep difficulty.      Surgery, elective:Hysterectomy oophorectomy  09/16/2012    Schatzki's ring 09/16/2012    Hyperlipidemia: without diabetes ASCVD Risk 15.5%   with diabetes 27.9% ( elevated glucoses)     GERD (gastroesophageal reflux disease)     Osteopenia        Patient's Medications   New Prescriptions    No medications on file   Previous Medications    ADVAIR DISKUS 100-50 MCG/DOSE DISKUS INHALER    INHALE 1 PUFF INTO THE LUNGS TWICE DAILY    ALBUTEROL (PROVENTIL/VENTOLIN HFA) 90 MCG/ACTUATION INHALER    Inhale 2 puffs into the lungs every 6 (six) hours as needed for Wheezing. May substitute any albuterol inhaler on her plan.    ATORVASTATIN (LIPITOR) 10 MG TABLET    Take 1 tablet (10 mg total) by mouth every evening.    BLOOD SUGAR DIAGNOSTIC (BLOOD GLUCOSE TEST) STRP    One  test strip per glucose testing twice a day: Insurance Brand Preference    BLOOD-GLUCOSE METER MISC    Dispense one meter: Insurance Brand Preference    CHOLECALCIFEROL, VITAMIN D3, (VITAMIN D3) 25 MCG (1,000 UNIT) CAPSULE    Take 4,000 Units by mouth once daily. Per Dr. Flores    LANCETS MISC    1 each by Misc.(Non-Drug; Combo Route) route once daily.    LANCETS MISC    One lancet per glucose testing twice a day: Insurance Brand Preference    LORAZEPAM (ATIVAN) 1 MG TABLET    TAKE 1 TABLET BY MOUTH EVERY EVENING AS NEEDED FOR PANIC ATTACKS    MELATONIN 1 MG TAB    Take 1 tablet by mouth every evening.    PANTOPRAZOLE (PROTONIX) 20 MG TABLET    Take 1 tablet (20 mg total) by mouth once daily.    TRUE METRIX GLUCOSE TEST STRIP STRP    USE TO MEASURE BLOOD SUGAR DAILY   Modified Medications    No medications on file   Discontinued Medications    No medications on file        Review of Systems   Constitutional: Negative for fever.   HENT: Negative for nosebleeds.    Cardiovascular: Negative for chest pain, dyspnea on exertion, irregular heartbeat, leg swelling, near-syncope, orthopnea, palpitations, paroxysmal nocturnal dyspnea and syncope.        As above   Respiratory: Negative for hemoptysis.    Hematologic/Lymphatic: Negative for bleeding problem.   Musculoskeletal: Positive for arthritis.   Gastrointestinal: Negative for hematochezia.   Genitourinary: Negative for hematuria.   Neurological: Negative for seizures.   Allergic/Immunologic: Positive for environmental allergies.         Objective:   Vitals  Vitals:    01/27/22 0924   BP: (!) 146/79   Pulse: 72   SpO2: 98%   Weight: 72 kg (158 lb 11.7 oz)          Physical Exam  Constitutional:       General: She is not in acute distress.     Appearance: She is well-developed. She is not diaphoretic.   HENT:      Head: Normocephalic.   Neck:      Vascular: No JVD.   Cardiovascular:      Rate and Rhythm: Normal rate and regular rhythm.      Heart sounds: No murmur  heard.  No friction rub. No gallop.    Pulmonary:      Effort: Pulmonary effort is normal. No respiratory distress.      Breath sounds: Normal breath sounds.   Abdominal:      Palpations: Abdomen is soft.      Tenderness: There is no abdominal tenderness.   Musculoskeletal:         General: No swelling.      Cervical back: Normal range of motion.   Skin:     General: Skin is warm.   Neurological:      Mental Status: She is alert.   Psychiatric:         Mood and Affect: Mood normal.             Assessment:     1. MOREJON (dyspnea on exertion)    2. Abnormal EKG    3. Chest pain, unspecified type        Plan:   Winnie Ngo is a 76 y.o. female h/o HLD  Asthma    EKG personally reviewed. My interpretation:  1/27/22: SR 60s, normal axis. Non-sp ST-T abn. QTc 413    CP, MOREJON  - EKG 1/24/22 with ST-depression  - Given symptoms, will proceed with regadeonson nuclear stress test (arthritis; no active wheeze)- would like to schedule after talking to her son about his schedule. She will call to schedule  - Echo (already scheduled for today)    HLD  Lab Results   Component Value Date    LDLCALC 66.0 05/06/2021   Statin as above    Continue with current medical plan and lifestyle changes.    No orders of the defined types were placed in this encounter.      Follow up as scheduled  Return sooner for concerns or questions. If symptoms persist go to the ED    She expressed verbal understanding and agreed with the plan      Jaja Marcelino MD  Interventional Cardiology  [facility]  Phone: [phone]

## 2022-01-28 ENCOUNTER — TELEPHONE (OUTPATIENT)
Dept: CARDIOLOGY | Facility: CLINIC | Age: 77
End: 2022-01-28
Payer: MEDICARE

## 2022-01-28 ENCOUNTER — PATIENT MESSAGE (OUTPATIENT)
Dept: CARDIOLOGY | Facility: CLINIC | Age: 77
End: 2022-01-28
Payer: MEDICARE

## 2022-01-28 RX ORDER — ATORVASTATIN CALCIUM 10 MG/1
10 TABLET, FILM COATED ORAL NIGHTLY
Qty: 90 TABLET | Refills: 1 | Status: SHIPPED | OUTPATIENT
Start: 2022-01-28 | End: 2022-08-05 | Stop reason: SDUPTHER

## 2022-01-28 NOTE — TELEPHONE ENCOUNTER
Called the pt in regards to this message.   The pt did not answer, but I left a detailed voice message as well as a call back number.      Sent pt portal message as well      ND

## 2022-01-28 NOTE — TELEPHONE ENCOUNTER
----- Message from Jaja Marcelino MD sent at 1/28/2022 12:34 PM CST -----  Echo stress test was normal. No need for nuclear stress (can be cancelled)    Thanks

## 2022-01-28 NOTE — TELEPHONE ENCOUNTER
Informed the patient about the echo and ekg results. Patient states that she was told by the cardiologist that her EKG was normal so she does not understand. Patient also wants an Accuview diabetic machine.

## 2022-01-28 NOTE — TELEPHONE ENCOUNTER
Patient underwent stress echo.  EKG portion abnormal with findings not consistent with ischemia and the echo portion is unremarkable.  Patient is confused regarding the recommendations of whether not she should proceed with a nuclear stress test.  She was also given the impression that after the encounter that the EKG is normal.  Would you mind please contacting patient and discuss recommendations?  Thank you so much.

## 2022-01-28 NOTE — TELEPHONE ENCOUNTER
Please inform patient that the echo or ultrasound portion of this study was negative.  The EKG portion remained abnormal.  I would still proceed with what Dr. Zamora.  I believe she was going to talk to her son 1st and get back with the cardiologist.  The nuclear stress test may provide additional information since the baseline EKG is abnormal.

## 2022-01-28 NOTE — TELEPHONE ENCOUNTER
----- Message from Vidya Rod sent at 1/28/2022 11:02 AM CST -----  Contact: [phone]/ self  Who Called: pt   Regarding: wants to knwo the status on rx DOBUTamine  Would the patient rather a call back or a response via [facility]? Call back  Best Call Back Number: [phone]  Additional Information:

## 2022-01-31 NOTE — TELEPHONE ENCOUNTER
Called pt in regards to the pt portal message.     Pt request to follow up as a phone encounter due to knee problems  scheduled pt follow up with Dr. Marcelino on Thursday 02-03-22    No further questions at this time    ND

## 2022-02-02 ENCOUNTER — TELEPHONE (OUTPATIENT)
Dept: CARDIOLOGY | Facility: CLINIC | Age: 77
End: 2022-02-02
Payer: MEDICARE

## 2022-02-02 NOTE — TELEPHONE ENCOUNTER
Called pt to reschedule an appointment with Dr. Marcelino.  Pt agreed to be seen on Thursday 05-05-22 at 10:40 am  Mailed appt reminder to the pt    ND

## 2022-02-02 NOTE — TELEPHONE ENCOUNTER
Called pt back in regards to this message.   Confirmed pt appointment tomorrow with Dr. Ryland OSBORN

## 2022-02-02 NOTE — TELEPHONE ENCOUNTER
----- Message from Berta Pagan sent at 2/2/2022 11:27 AM CST -----  Needs advice from nurse:      Who Called:pt  Regarding:patient states that tomorrow's visit is suppose to be an Audio visit/phone call/but it still shows as in person visit  Would the patient rather a call back or VIA [facility]?  Best Call Back number:[phone]  Additional Info:

## 2022-02-02 NOTE — PROGRESS NOTES
Called and discussed echo stress results  No nuclear stress for now  FUP 3 months  No exertional symptoms

## 2022-02-18 ENCOUNTER — OFFICE VISIT (OUTPATIENT)
Dept: OPTOMETRY | Facility: CLINIC | Age: 77
End: 2022-02-18
Payer: MEDICARE

## 2022-02-18 DIAGNOSIS — H52.4 HYPEROPIA WITH ASTIGMATISM AND PRESBYOPIA, BILATERAL: ICD-10-CM

## 2022-02-18 DIAGNOSIS — H52.203 HYPEROPIA WITH ASTIGMATISM AND PRESBYOPIA, BILATERAL: ICD-10-CM

## 2022-02-18 DIAGNOSIS — H52.03 HYPEROPIA WITH ASTIGMATISM AND PRESBYOPIA, BILATERAL: ICD-10-CM

## 2022-02-18 DIAGNOSIS — H25.13 NUCLEAR SCLEROSIS OF BOTH EYES: ICD-10-CM

## 2022-02-18 DIAGNOSIS — E11.9 TYPE 2 DIABETES MELLITUS WITHOUT RETINOPATHY: Primary | ICD-10-CM

## 2022-02-18 PROCEDURE — 92015 PR REFRACTION: ICD-10-PCS | Mod: S$GLB,,, | Performed by: OPTOMETRIST

## 2022-02-18 PROCEDURE — 92014 COMPRE OPH EXAM EST PT 1/>: CPT | Mod: S$GLB,,, | Performed by: OPTOMETRIST

## 2022-02-18 PROCEDURE — 3288F PR FALLS RISK ASSESSMENT DOCUMENTED: ICD-10-PCS | Mod: CPTII,S$GLB,, | Performed by: OPTOMETRIST

## 2022-02-18 PROCEDURE — 1126F AMNT PAIN NOTED NONE PRSNT: CPT | Mod: CPTII,S$GLB,, | Performed by: OPTOMETRIST

## 2022-02-18 PROCEDURE — 1101F PT FALLS ASSESS-DOCD LE1/YR: CPT | Mod: CPTII,S$GLB,, | Performed by: OPTOMETRIST

## 2022-02-18 PROCEDURE — 2023F PR DILATED RETINAL EXAM W/O EVID OF RETINOPATHY: ICD-10-PCS | Mod: CPTII,S$GLB,, | Performed by: OPTOMETRIST

## 2022-02-18 PROCEDURE — 1159F PR MEDICATION LIST DOCUMENTED IN MEDICAL RECORD: ICD-10-PCS | Mod: CPTII,S$GLB,, | Performed by: OPTOMETRIST

## 2022-02-18 PROCEDURE — 1126F PR PAIN SEVERITY QUANTIFIED, NO PAIN PRESENT: ICD-10-PCS | Mod: CPTII,S$GLB,, | Performed by: OPTOMETRIST

## 2022-02-18 PROCEDURE — 92014 PR EYE EXAM, EST PATIENT,COMPREHESV: ICD-10-PCS | Mod: S$GLB,,, | Performed by: OPTOMETRIST

## 2022-02-18 PROCEDURE — 2023F DILAT RTA XM W/O RTNOPTHY: CPT | Mod: CPTII,S$GLB,, | Performed by: OPTOMETRIST

## 2022-02-18 PROCEDURE — 1101F PR PT FALLS ASSESS DOC 0-1 FALLS W/OUT INJ PAST YR: ICD-10-PCS | Mod: CPTII,S$GLB,, | Performed by: OPTOMETRIST

## 2022-02-18 PROCEDURE — 99499 UNLISTED E&M SERVICE: CPT | Mod: S$GLB,,, | Performed by: OPTOMETRIST

## 2022-02-18 PROCEDURE — 3288F FALL RISK ASSESSMENT DOCD: CPT | Mod: CPTII,S$GLB,, | Performed by: OPTOMETRIST

## 2022-02-18 PROCEDURE — 1159F MED LIST DOCD IN RCRD: CPT | Mod: CPTII,S$GLB,, | Performed by: OPTOMETRIST

## 2022-02-18 PROCEDURE — 99999 PR PBB SHADOW E&M-EST. PATIENT-LVL III: CPT | Mod: PBBFAC,,, | Performed by: OPTOMETRIST

## 2022-02-18 PROCEDURE — 99499 RISK ADDL DX/OHS AUDIT: ICD-10-PCS | Mod: S$GLB,,, | Performed by: OPTOMETRIST

## 2022-02-18 PROCEDURE — 99999 PR PBB SHADOW E&M-EST. PATIENT-LVL III: ICD-10-PCS | Mod: PBBFAC,,, | Performed by: OPTOMETRIST

## 2022-02-18 PROCEDURE — 92015 DETERMINE REFRACTIVE STATE: CPT | Mod: S$GLB,,, | Performed by: OPTOMETRIST

## 2022-02-18 NOTE — PROGRESS NOTES
HPI     Last eye exam was 3/13/19 with Dr. Mehta.    Patient states she didn't like her most recent glasses so she went back to   wearing her old glasses. Her new glasses are very heavy. She wants to get   an updated glasses rx today.  Patient denies diplopia, headaches, flashes/floaters, and pain.    (+)DM  Hemoglobin A1C       Date                     Value               Ref Range             Status                11/09/2021               6.1 (H)             4.0 - 5.6 %           Final                  Last edited by Allie Forrest, OD on 2/18/2022  1:37 PM. (History)            Assessment /Plan     For exam results, see Encounter Report.    Type 2 diabetes mellitus without retinopathy    Nuclear sclerosis of both eyes    Hyperopia with astigmatism and presbyopia, bilateral      1. No retinopathy noted, OU. Continue proper BS control and annual diabetic eye exams. Monitor yearly.     2. Educated pt on findings. Mildly visually significant NS OU. Discussed option of cataract surgery but patient would like to wait. Okay to monitor yearly.     3. Updated SRx. Mild change from habitual. Discussed thick lenses due to high refractive error. Recommend high index lenses in order to decrease thickness. Monitor yearly.       RTC in 1 year for annual DM eye exam or sooner if needed.

## 2022-02-24 ENCOUNTER — TELEPHONE (OUTPATIENT)
Dept: OPTOMETRY | Facility: CLINIC | Age: 77
End: 2022-02-24
Payer: MEDICARE

## 2022-02-24 NOTE — TELEPHONE ENCOUNTER
Spoke to L[place] at [facility] ([phone]) RE: billing from 02/18/2022 visit with Dr. Forrest. [facility] stated that pt paid a copay of $30.00 instead of $20.00 for visit. Information sent to Karen Urban for review of visit.

## 2022-02-24 NOTE — TELEPHONE ENCOUNTER
----- Message from Gill Briscoe sent at 2/22/2022  4:48 PM CST -----  Regarding: Jesús Briscoe  Caller: Winnie @[phone] (Today,  3:25 PM)         Previous Messages       ----- Message -----   From: Sara Simpson   Sent: 2/22/2022   3:28 PM CST   To: Adriane Kelley Staff     Katelyn with [facility] [facility] calling on behalf of the pt regarding being overcharged for the visit. She paid $30 for copay instead of $20. Please contact the pt regarding   Katelyn at [facility] at [phone]

## 2022-02-28 ENCOUNTER — TELEPHONE (OUTPATIENT)
Dept: OPTOMETRY | Facility: CLINIC | Age: 77
End: 2022-02-28
Payer: MEDICARE

## 2022-02-28 NOTE — TELEPHONE ENCOUNTER
Spoke to Ovidio at [facility] ([phone]) regarding DOS 02/18/2022 with Dr. Forrest. Informed Pt Care Rep that copay was not collected at DOS 02/18/2022 by [facility]. Ovidio (Pt Care Rep) noted understanding and stated that pt is normally responsible for $20.00 but if copay was not collected by [facility], there would no billing errors on pt's account with [facility].            Gill Briscoe   Caller: Unspecified (Today, 11:18 AM)   Good Afternoon,     I have reviewed the patient's account. I am not showing where a copay was paid on 2/18/22. The last payment showing on the patient's account was made on 1/27/22. If the patient did in fact pay a copay on this dos, and was overcharged, the $10 will be refunded to her once payment has been received from the insurance company. If she does not receive the refund, she will have to show proof of payment because I am not showing where the copay was paid on dos.     Karen Contreras             Previous Messages         ----- Message -----   From: Gill Briscoe   Sent: 2/24/2022  11:20 AM CST   To: Karen Urban   Subject: DLS: 02/18/2022 - Dr. Forrest - Incorrect Copa*     Pedro Pablo Jones,     Patient Ms. Winnie Ngo saw Dr. Forrest is clinic on 02/18/2022 for her eye exam. [facility] contacted us and notified us that that the patient paid $30.00 for her copay instead of $20.00. Is there any way that this can resolved?     ThanksGill

## 2022-03-11 DIAGNOSIS — F41.9 ANXIETY: ICD-10-CM

## 2022-03-11 RX ORDER — LORAZEPAM 1 MG/1
1 TABLET ORAL NIGHTLY PRN
Qty: 30 TABLET | Refills: 3 | Status: SHIPPED | OUTPATIENT
Start: 2022-03-11 | End: 2022-08-23

## 2022-05-04 ENCOUNTER — PATIENT OUTREACH (OUTPATIENT)
Dept: ADMINISTRATIVE | Facility: OTHER | Age: 77
End: 2022-05-04
Payer: MEDICARE

## 2022-05-04 NOTE — PROGRESS NOTES
Health Maintenance Due   Topic Date Due    Hepatitis C Screening  Never done    TETANUS VACCINE  06/04/2008    Foot Exam  10/21/2020    COVID-19 Vaccine (4 - Booster for Moderna series) 04/17/2022    Diabetes Urine Screening  05/06/2022    Hemoglobin A1c  05/09/2022     Updates were requested from care everywhere.  Chart was reviewed for overdue Proactive [facility] Encounters ([facility]) topics (CRS, Breast Cancer Screening, Eye exam)  Health Maintenance has been updated.  LINKS immunization registry triggered.  Immunizations were reconciled.

## 2022-05-05 ENCOUNTER — OFFICE VISIT (OUTPATIENT)
Dept: CARDIOLOGY | Facility: CLINIC | Age: 77
End: 2022-05-05
Payer: MEDICARE

## 2022-05-05 VITALS
SYSTOLIC BLOOD PRESSURE: 113 MMHG | OXYGEN SATURATION: 96 % | BODY MASS INDEX: 29.03 KG/M2 | HEART RATE: 70 BPM | WEIGHT: 158.75 LBS | DIASTOLIC BLOOD PRESSURE: 71 MMHG

## 2022-05-05 DIAGNOSIS — R94.39 ABNORMAL STRESS ECHO: Primary | ICD-10-CM

## 2022-05-05 DIAGNOSIS — E78.5 HYPERLIPIDEMIA, UNSPECIFIED HYPERLIPIDEMIA TYPE: ICD-10-CM

## 2022-05-05 DIAGNOSIS — R07.9 CHEST PAIN, UNSPECIFIED TYPE: ICD-10-CM

## 2022-05-05 DIAGNOSIS — R06.09 DOE (DYSPNEA ON EXERTION): ICD-10-CM

## 2022-05-05 DIAGNOSIS — R94.31 ABNORMAL EKG: ICD-10-CM

## 2022-05-05 PROCEDURE — 1126F PR PAIN SEVERITY QUANTIFIED, NO PAIN PRESENT: ICD-10-PCS | Mod: CPTII,S$GLB,, | Performed by: INTERNAL MEDICINE

## 2022-05-05 PROCEDURE — 3074F SYST BP LT 130 MM HG: CPT | Mod: CPTII,S$GLB,, | Performed by: INTERNAL MEDICINE

## 2022-05-05 PROCEDURE — 3078F DIAST BP <80 MM HG: CPT | Mod: CPTII,S$GLB,, | Performed by: INTERNAL MEDICINE

## 2022-05-05 PROCEDURE — 99499 RISK ADDL DX/OHS AUDIT: ICD-10-PCS | Mod: S$GLB,,, | Performed by: INTERNAL MEDICINE

## 2022-05-05 PROCEDURE — 1160F PR REVIEW ALL MEDS BY PRESCRIBER/CLIN PHARMACIST DOCUMENTED: ICD-10-PCS | Mod: CPTII,S$GLB,, | Performed by: INTERNAL MEDICINE

## 2022-05-05 PROCEDURE — 99213 OFFICE O/P EST LOW 20 MIN: CPT | Mod: S$GLB,,, | Performed by: INTERNAL MEDICINE

## 2022-05-05 PROCEDURE — 1160F RVW MEDS BY RX/DR IN RCRD: CPT | Mod: CPTII,S$GLB,, | Performed by: INTERNAL MEDICINE

## 2022-05-05 PROCEDURE — 3078F PR MOST RECENT DIASTOLIC BLOOD PRESSURE < 80 MM HG: ICD-10-PCS | Mod: CPTII,S$GLB,, | Performed by: INTERNAL MEDICINE

## 2022-05-05 PROCEDURE — 3074F PR MOST RECENT SYSTOLIC BLOOD PRESSURE < 130 MM HG: ICD-10-PCS | Mod: CPTII,S$GLB,, | Performed by: INTERNAL MEDICINE

## 2022-05-05 PROCEDURE — 1159F PR MEDICATION LIST DOCUMENTED IN MEDICAL RECORD: ICD-10-PCS | Mod: CPTII,S$GLB,, | Performed by: INTERNAL MEDICINE

## 2022-05-05 PROCEDURE — 99213 PR OFFICE/OUTPT VISIT, EST, LEVL III, 20-29 MIN: ICD-10-PCS | Mod: S$GLB,,, | Performed by: INTERNAL MEDICINE

## 2022-05-05 PROCEDURE — 1126F AMNT PAIN NOTED NONE PRSNT: CPT | Mod: CPTII,S$GLB,, | Performed by: INTERNAL MEDICINE

## 2022-05-05 PROCEDURE — 99999 PR PBB SHADOW E&M-EST. PATIENT-LVL III: ICD-10-PCS | Mod: PBBFAC,,, | Performed by: INTERNAL MEDICINE

## 2022-05-05 PROCEDURE — 99999 PR PBB SHADOW E&M-EST. PATIENT-LVL III: CPT | Mod: PBBFAC,,, | Performed by: INTERNAL MEDICINE

## 2022-05-05 PROCEDURE — 1159F MED LIST DOCD IN RCRD: CPT | Mod: CPTII,S$GLB,, | Performed by: INTERNAL MEDICINE

## 2022-05-05 PROCEDURE — 99499 UNLISTED E&M SERVICE: CPT | Mod: S$GLB,,, | Performed by: INTERNAL MEDICINE

## 2022-05-05 NOTE — PROGRESS NOTES
Cardiology Clinic note    Subjective:   Patient ID:  Winnie Ngo is a 77 y.o. female who presents for MOREJON, CP FUP    HPI:   Abnormal EKG, CP, MOREJON: EKG 1/24/22 with ST-depression diffusely present, most prominent AL leads. Reports was not symptomatic at the time. Denies CP. Reports some MOREJON (chronic; no worsening)    HLD: Tolerating statin    SH Tobacco Never used  FH Father had MI at age 41    Patient Active Problem List    Diagnosis Date Noted    Insomnia 11/12/2020    Panic disorder (episodic paroxysmal anxiety) 11/12/2020    Overweight (BMI 25.0-29.9) 11/12/2020    Mild persistent asthma 08/19/2020    History of ductal carcinoma in situ (DCIS) of breast 08/19/2020     2016 lumpectomy       Bilateral dry eyes 03/13/2019    Nuclear sclerotic cataract of both eyes 03/13/2019    Abnormal gastrointestinal PET scan 06/21/2016    Osteoarthritis of left knee, severe 07/23/2013    Mild vitamin D deficiency 05/09/2013    History of colon cancer 11/20/2012     Sigmoid colectomy in 9/00 with Dr Samuel and current specialist Dr Way.  Last colonoscopy in 2013 and patient prefers Cologuard due to prep difficulty.      Surgery, elective:Hysterectomy oophorectomy  09/16/2012    Schatzki's ring 09/16/2012    Hyperlipidemia: without diabetes ASCVD Risk 15.5%   with diabetes 27.9% ( elevated glucoses)     GERD (gastroesophageal reflux disease)     Osteopenia        Patient's Medications   New Prescriptions    No medications on file   Previous Medications    ADVAIR DISKUS 100-50 MCG/DOSE DISKUS INHALER    INHALE 1 PUFF INTO THE LUNGS TWICE DAILY    ALBUTEROL (PROVENTIL/VENTOLIN HFA) 90 MCG/ACTUATION INHALER    Inhale 2 puffs into the lungs every 6 (six) hours as needed for Wheezing. May substitute any albuterol inhaler on her plan.    ATORVASTATIN (LIPITOR) 10 MG TABLET    Take 1 tablet (10 mg total) by mouth every evening.    BLOOD SUGAR DIAGNOSTIC (BLOOD GLUCOSE TEST) STRP    One test strip per glucose  testing twice a day: Insurance Brand Preference    BLOOD-GLUCOSE METER MISC    Dispense one meter: Insurance Brand Preference    CHOLECALCIFEROL, VITAMIN D3, (VITAMIN D3) 25 MCG (1,000 UNIT) CAPSULE    Take 4,000 Units by mouth once daily. Per Dr. Flores    LANCETS MISC    1 each by Misc.(Non-Drug; Combo Route) route once daily.    LANCETS MISC    One lancet per glucose testing twice a day: Insurance Brand Preference    LORAZEPAM (ATIVAN) 1 MG TABLET    Take 1 tablet (1 mg total) by mouth nightly as needed for Anxiety.    MELATONIN 1 MG TAB    Take 1 tablet by mouth every evening.    PANTOPRAZOLE (PROTONIX) 20 MG TABLET    Take 1 tablet (20 mg total) by mouth once daily.    TRUE METRIX GLUCOSE TEST STRIP STRP    USE TO MEASURE BLOOD SUGAR DAILY   Modified Medications    No medications on file   Discontinued Medications    No medications on file        Review of Systems   Constitutional: Negative for fever.   HENT: Negative for nosebleeds.    Cardiovascular: Negative for chest pain, dyspnea on exertion, irregular heartbeat, leg swelling, near-syncope, orthopnea, palpitations, paroxysmal nocturnal dyspnea and syncope.        As above   Respiratory: Negative for hemoptysis.    Hematologic/Lymphatic: Negative for bleeding problem.   Musculoskeletal: Positive for arthritis.   Gastrointestinal: Negative for hematochezia.   Genitourinary: Negative for hematuria.   Neurological: Negative for seizures.   Allergic/Immunologic: Positive for environmental allergies.         Objective:   Vitals  Vitals:    05/05/22 1057   BP: 113/71   Pulse: 70   SpO2: 96%   Weight: 72 kg (158 lb 11.7 oz)          Physical Exam  Constitutional:       General: She is not in acute distress.     Appearance: She is well-developed. She is not diaphoretic.   HENT:      Head: Normocephalic.   Neck:      Vascular: No JVD.   Cardiovascular:      Rate and Rhythm: Normal rate and regular rhythm.      Heart sounds: No murmur heard.    No friction rub. No  gallop.   Pulmonary:      Effort: Pulmonary effort is normal. No respiratory distress.      Breath sounds: Normal breath sounds.   Abdominal:      Palpations: Abdomen is soft.      Tenderness: There is no abdominal tenderness.   Musculoskeletal:         General: No swelling.      Cervical back: Normal range of motion.   Skin:     General: Skin is warm.   Neurological:      Mental Status: She is alert.   Psychiatric:         Mood and Affect: Mood normal.             Assessment:     1. Abnormal stress echo    2. Abnormal EKG    3. Hyperlipidemia, unspecified hyperlipidemia type    4. MOREJON (dyspnea on exertion)    5. Chest pain, unspecified type        Plan:   Winnie Ngo is a 77 y.o. female h/o HLD  Asthma    EKG personally reviewed. My interpretation:  1/27/22: SR 60s, normal axis. Non-sp ST-T abn. QTc 413    CP, MOREJON  - EKG 1/24/22 with ST-depression  - Dobutamine stress echo 2022  · There were no arrhythmias during stress.  · The stress echo portion of this study is negative for myocardial ischemia.  · The ECG portion of this study is abnormal but not diagnostic for ischemia.  · The left ventricle is normal in size with normal systolic function.  · The estimated ejection fraction is 55%.  · Normal right ventricular size with normal right ventricular systolic function.  · The patient reported chest discomfort during stress  · MPHR 100%  - Discussed stress test results. Continues to have MOREJON. Reports no worsening. Discussed angiogram (CT, invasive). Would like to defer. In the context of her symptoms, this is reasonable. Asked to call with any changes. Persistent or worsening symptoms, please come to the ED.    HLD  Lab Results   Component Value Date    LDLCALC 66.0 05/06/2021   Statin as above    Continue with current medical plan and lifestyle changes.    No orders of the defined types were placed in this encounter.      Follow up as scheduled  Return sooner for concerns or questions. If symptoms persist go to  the ED    She expressed verbal understanding and agreed with the plan      Jaja Marcelino MD  Interventional Cardiology  [facility]  Phone: [phone]

## 2022-05-31 ENCOUNTER — TELEPHONE (OUTPATIENT)
Dept: FAMILY MEDICINE | Facility: CLINIC | Age: 77
End: 2022-05-31
Payer: MEDICARE

## 2022-08-05 ENCOUNTER — LAB VISIT (OUTPATIENT)
Dept: LAB | Facility: HOSPITAL | Age: 77
End: 2022-08-05
Attending: INTERNAL MEDICINE
Payer: MEDICARE

## 2022-08-05 ENCOUNTER — OFFICE VISIT (OUTPATIENT)
Dept: INTERNAL MEDICINE | Facility: CLINIC | Age: 77
End: 2022-08-05
Payer: MEDICARE

## 2022-08-05 VITALS
SYSTOLIC BLOOD PRESSURE: 104 MMHG | BODY MASS INDEX: 29.03 KG/M2 | HEART RATE: 74 BPM | OXYGEN SATURATION: 95 % | DIASTOLIC BLOOD PRESSURE: 66 MMHG | WEIGHT: 158.75 LBS

## 2022-08-05 DIAGNOSIS — E78.5 HYPERLIPIDEMIA, UNSPECIFIED HYPERLIPIDEMIA TYPE: ICD-10-CM

## 2022-08-05 DIAGNOSIS — E11.9 CONTROLLED TYPE 2 DIABETES MELLITUS WITHOUT COMPLICATION, WITHOUT LONG-TERM CURRENT USE OF INSULIN: ICD-10-CM

## 2022-08-05 DIAGNOSIS — Z86.000 HISTORY OF DUCTAL CARCINOMA IN SITU (DCIS) OF BREAST: ICD-10-CM

## 2022-08-05 DIAGNOSIS — Z85.038 HISTORY OF COLON CANCER: ICD-10-CM

## 2022-08-05 DIAGNOSIS — Z12.39 ENCOUNTER FOR SCREENING FOR MALIGNANT NEOPLASM OF BREAST, UNSPECIFIED SCREENING MODALITY: ICD-10-CM

## 2022-08-05 DIAGNOSIS — M81.0 OSTEOPOROSIS, UNSPECIFIED OSTEOPOROSIS TYPE, UNSPECIFIED PATHOLOGICAL FRACTURE PRESENCE: ICD-10-CM

## 2022-08-05 DIAGNOSIS — Z12.31 ENCOUNTER FOR SCREENING MAMMOGRAM FOR MALIGNANT NEOPLASM OF BREAST: ICD-10-CM

## 2022-08-05 DIAGNOSIS — N20.0 NEPHROLITHIASIS: ICD-10-CM

## 2022-08-05 DIAGNOSIS — Z00.00 PREVENTATIVE HEALTH CARE: ICD-10-CM

## 2022-08-05 DIAGNOSIS — Z00.00 PREVENTATIVE HEALTH CARE: Primary | ICD-10-CM

## 2022-08-05 LAB
ALBUMIN SERPL BCP-MCNC: 4.1 G/DL (ref 3.5–5.2)
ALP SERPL-CCNC: 62 U/L (ref 55–135)
ALT SERPL W/O P-5'-P-CCNC: 13 U/L (ref 10–44)
ANION GAP SERPL CALC-SCNC: 10 MMOL/L (ref 8–16)
AST SERPL-CCNC: 17 U/L (ref 10–40)
BASOPHILS # BLD AUTO: 0.06 K/UL (ref 0–0.2)
BASOPHILS NFR BLD: 1.2 % (ref 0–1.9)
BILIRUB SERPL-MCNC: 2 MG/DL (ref 0.1–1)
BUN SERPL-MCNC: 11 MG/DL (ref 8–23)
CALCIUM SERPL-MCNC: 10.3 MG/DL (ref 8.7–10.5)
CHLORIDE SERPL-SCNC: 105 MMOL/L (ref 95–110)
CHOLEST SERPL-MCNC: 151 MG/DL (ref 120–199)
CHOLEST/HDLC SERPL: 2.8 {RATIO} (ref 2–5)
CO2 SERPL-SCNC: 27 MMOL/L (ref 23–29)
CREAT SERPL-MCNC: 0.7 MG/DL (ref 0.5–1.4)
DIFFERENTIAL METHOD: ABNORMAL
EOSINOPHIL # BLD AUTO: 0.2 K/UL (ref 0–0.5)
EOSINOPHIL NFR BLD: 3.5 % (ref 0–8)
ERYTHROCYTE [DISTWIDTH] IN BLOOD BY AUTOMATED COUNT: 12.2 % (ref 11.5–14.5)
EST. GFR  (NO RACE VARIABLE): >60 ML/MIN/1.73 M^2
ESTIMATED AVG GLUCOSE: 154 MG/DL (ref 68–131)
GLUCOSE SERPL-MCNC: 148 MG/DL (ref 70–110)
HBA1C MFR BLD: 7 % (ref 4–5.6)
HCT VFR BLD AUTO: 38.1 % (ref 37–48.5)
HDLC SERPL-MCNC: 53 MG/DL (ref 40–75)
HDLC SERPL: 35.1 % (ref 20–50)
HGB BLD-MCNC: 12.8 G/DL (ref 12–16)
IMM GRANULOCYTES # BLD AUTO: 0.03 K/UL (ref 0–0.04)
IMM GRANULOCYTES NFR BLD AUTO: 0.6 % (ref 0–0.5)
LDLC SERPL CALC-MCNC: 78.4 MG/DL (ref 63–159)
LYMPHOCYTES # BLD AUTO: 1.5 K/UL (ref 1–4.8)
LYMPHOCYTES NFR BLD: 29.7 % (ref 18–48)
MCH RBC QN AUTO: 33.5 PG (ref 27–31)
MCHC RBC AUTO-ENTMCNC: 33.6 G/DL (ref 32–36)
MCV RBC AUTO: 100 FL (ref 82–98)
MONOCYTES # BLD AUTO: 0.4 K/UL (ref 0.3–1)
MONOCYTES NFR BLD: 7.8 % (ref 4–15)
NEUTROPHILS # BLD AUTO: 2.8 K/UL (ref 1.8–7.7)
NEUTROPHILS NFR BLD: 57.2 % (ref 38–73)
NONHDLC SERPL-MCNC: 98 MG/DL
NRBC BLD-RTO: 0 /100 WBC
PLATELET # BLD AUTO: 179 K/UL (ref 150–450)
PMV BLD AUTO: 11.3 FL (ref 9.2–12.9)
POTASSIUM SERPL-SCNC: 3.9 MMOL/L (ref 3.5–5.1)
PROT SERPL-MCNC: 7.3 G/DL (ref 6–8.4)
RBC # BLD AUTO: 3.82 M/UL (ref 4–5.4)
SODIUM SERPL-SCNC: 142 MMOL/L (ref 136–145)
TRIGL SERPL-MCNC: 98 MG/DL (ref 30–150)
TSH SERPL DL<=0.005 MIU/L-ACNC: 2.43 UIU/ML (ref 0.4–4)
WBC # BLD AUTO: 4.88 K/UL (ref 3.9–12.7)

## 2022-08-05 PROCEDURE — 3078F PR MOST RECENT DIASTOLIC BLOOD PRESSURE < 80 MM HG: ICD-10-PCS | Mod: CPTII,S$GLB,, | Performed by: INTERNAL MEDICINE

## 2022-08-05 PROCEDURE — 3074F PR MOST RECENT SYSTOLIC BLOOD PRESSURE < 130 MM HG: ICD-10-PCS | Mod: CPTII,S$GLB,, | Performed by: INTERNAL MEDICINE

## 2022-08-05 PROCEDURE — 36415 COLL VENOUS BLD VENIPUNCTURE: CPT | Mod: PO | Performed by: INTERNAL MEDICINE

## 2022-08-05 PROCEDURE — 99999 PR PBB SHADOW E&M-EST. PATIENT-LVL IV: CPT | Mod: PBBFAC,,, | Performed by: INTERNAL MEDICINE

## 2022-08-05 PROCEDURE — 1126F AMNT PAIN NOTED NONE PRSNT: CPT | Mod: CPTII,S$GLB,, | Performed by: INTERNAL MEDICINE

## 2022-08-05 PROCEDURE — 83036 HEMOGLOBIN GLYCOSYLATED A1C: CPT | Performed by: INTERNAL MEDICINE

## 2022-08-05 PROCEDURE — 3288F PR FALLS RISK ASSESSMENT DOCUMENTED: ICD-10-PCS | Mod: CPTII,S$GLB,, | Performed by: INTERNAL MEDICINE

## 2022-08-05 PROCEDURE — 3078F DIAST BP <80 MM HG: CPT | Mod: CPTII,S$GLB,, | Performed by: INTERNAL MEDICINE

## 2022-08-05 PROCEDURE — 84443 ASSAY THYROID STIM HORMONE: CPT | Performed by: INTERNAL MEDICINE

## 2022-08-05 PROCEDURE — 3074F SYST BP LT 130 MM HG: CPT | Mod: CPTII,S$GLB,, | Performed by: INTERNAL MEDICINE

## 2022-08-05 PROCEDURE — 80053 COMPREHEN METABOLIC PANEL: CPT | Performed by: INTERNAL MEDICINE

## 2022-08-05 PROCEDURE — 1159F MED LIST DOCD IN RCRD: CPT | Mod: CPTII,S$GLB,, | Performed by: INTERNAL MEDICINE

## 2022-08-05 PROCEDURE — 99397 PR PREVENTIVE VISIT,EST,65 & OVER: ICD-10-PCS | Mod: GZ,S$GLB,, | Performed by: INTERNAL MEDICINE

## 2022-08-05 PROCEDURE — 99499 RISK ADDL DX/OHS AUDIT: ICD-10-PCS | Mod: S$GLB,,, | Performed by: INTERNAL MEDICINE

## 2022-08-05 PROCEDURE — 3288F FALL RISK ASSESSMENT DOCD: CPT | Mod: CPTII,S$GLB,, | Performed by: INTERNAL MEDICINE

## 2022-08-05 PROCEDURE — 1101F PT FALLS ASSESS-DOCD LE1/YR: CPT | Mod: CPTII,S$GLB,, | Performed by: INTERNAL MEDICINE

## 2022-08-05 PROCEDURE — 1159F PR MEDICATION LIST DOCUMENTED IN MEDICAL RECORD: ICD-10-PCS | Mod: CPTII,S$GLB,, | Performed by: INTERNAL MEDICINE

## 2022-08-05 PROCEDURE — 80061 LIPID PANEL: CPT | Performed by: INTERNAL MEDICINE

## 2022-08-05 PROCEDURE — 1101F PR PT FALLS ASSESS DOC 0-1 FALLS W/OUT INJ PAST YR: ICD-10-PCS | Mod: CPTII,S$GLB,, | Performed by: INTERNAL MEDICINE

## 2022-08-05 PROCEDURE — 99999 PR PBB SHADOW E&M-EST. PATIENT-LVL IV: ICD-10-PCS | Mod: PBBFAC,,, | Performed by: INTERNAL MEDICINE

## 2022-08-05 PROCEDURE — 99499 UNLISTED E&M SERVICE: CPT | Mod: S$GLB,,, | Performed by: INTERNAL MEDICINE

## 2022-08-05 PROCEDURE — 1126F PR PAIN SEVERITY QUANTIFIED, NO PAIN PRESENT: ICD-10-PCS | Mod: CPTII,S$GLB,, | Performed by: INTERNAL MEDICINE

## 2022-08-05 PROCEDURE — 99397 PER PM REEVAL EST PAT 65+ YR: CPT | Mod: GZ,S$GLB,, | Performed by: INTERNAL MEDICINE

## 2022-08-05 PROCEDURE — 85025 COMPLETE CBC W/AUTO DIFF WBC: CPT | Performed by: INTERNAL MEDICINE

## 2022-08-05 RX ORDER — ATORVASTATIN CALCIUM 10 MG/1
10 TABLET, FILM COATED ORAL NIGHTLY
Qty: 90 TABLET | Refills: 1 | Status: SHIPPED | OUTPATIENT
Start: 2022-08-05 | End: 2023-02-08

## 2022-08-05 NOTE — PROGRESS NOTES
Subjective:       Patient ID: Winnie Ngo is a 77 y.o. female.    Chief Complaint: Diabetes    HPI 77-year-old female presents to clinic today for follow-up of dyslipidemia diet-controlled diabetes patient has a previous history of breast cancer and colon cancer.  Patient is without any significant acute complaints.  She is requesting new diabetic testing equipment  Review of Systems    otherwise negative  Objective:      Physical Exam  General: Well-appearing, well-nourished.  No distress  HEENT: conjunctivae are normal.  Pupils are equal and reative to light.  TM's are clear and intact bilaterally.  Hearing is grossly normal.  Nasopharynx is clear.  Oropharynx is clear.  Neck: Supple.  No thyroid megaly.  No bruits.  Lymph: No cervical or supraclavicular adenopathy.  Heart: Regular rate and rhythm, without murmur, rub or gallop.  Lungs: Clear to auscultation; respiratory effort normal.  Abdomen: Soft, nontender, nondistended.  Normoactive bowel sounds.  No hepatomegaly.  No masses.  Extremities: Good distal pulses.  No edema.  Psych: Oriented to time person place.  Judgment and insight seem unimpaired.  Mood and affect are appropriate.  Assessment:       Problem List Items Addressed This Visit     Hyperlipidemia: without diabetes ASCVD Risk 15.5%   with diabetes 27.9% ( elevated glucoses)    Relevant Medications    atorvastatin (LIPITOR) 10 MG tablet    History of colon cancer    History of ductal carcinoma in situ (DCIS) of breast    Relevant Orders    Mammo Digital Diagnostic Bilat      Other Visit Diagnoses     Preventative health care    -  Primary    Relevant Orders    CBC Auto Differential    Comprehensive Metabolic Panel    Lipid Panel    TSH    Controlled type 2 diabetes mellitus without complication, without long-term current use of insulin        Relevant Orders    CBC Auto Differential    Comprehensive Metabolic Panel    Lipid Panel    TSH    Hemoglobin A1C    Microalbumin/Creatinine Ratio, Urine     Comprehensive Metabolic Panel    Hemoglobin A1C    Lipid Panel    Nephrolithiasis        Relevant Orders    Urinalysis    Urine culture    Osteoporosis, unspecified osteoporosis type, unspecified pathological fracture presence        Relevant Orders    Ambulatory referral/consult to Endocrinology    Encounter for screening for malignant neoplasm of breast, unspecified screening modality        Encounter for screening mammogram for malignant neoplasm of breast               Plan:       Winnie was seen today for diabetes.    Diagnoses and all orders for this visit:    Preventative health care  -     CBC Auto Differential; Future  -     Comprehensive Metabolic Panel; Future  -     Lipid Panel; Future  -     TSH; Future  Healthcare maintenance performed, reviewed, updated and counseled today.  Controlled type 2 diabetes mellitus without complication, without long-term current use of insulin  -     CBC Auto Differential; Future  -     Comprehensive Metabolic Panel; Future  -     Lipid Panel; Future  -     TSH; Future  -     Hemoglobin A1C; Standing  -     Microalbumin/Creatinine Ratio, Urine; Standing  -     Comprehensive Metabolic Panel; Standing  -     Hemoglobin A1C; Standing  -     Lipid Panel; Standing  Controlled.  Continue current medical regimen.  Prescription refills addressed.  Followup advised. See after visit summary.  Nephrolithiasis  -     Urinalysis; Future  -     Urine culture; Future  Asymptomatic  Osteoporosis, unspecified osteoporosis type, unspecified pathological fracture presence  -     Ambulatory referral/consult to Endocrinology; Future    History of colon cancer  Colonoscopy up-to-date  Hyperlipidemia, unspecified hyperlipidemia type  -     atorvastatin (LIPITOR) 10 MG tablet; Take 1 tablet (10 mg total) by mouth every evening.      History of ductal carcinoma in situ (DCIS) of breast  -     Mammo Digital Diagnostic Bilat; Future

## 2022-08-08 ENCOUNTER — TELEPHONE (OUTPATIENT)
Dept: INTERNAL MEDICINE | Facility: CLINIC | Age: 77
End: 2022-08-08
Payer: MEDICARE

## 2022-08-08 ENCOUNTER — LAB VISIT (OUTPATIENT)
Dept: LAB | Facility: HOSPITAL | Age: 77
End: 2022-08-08
Attending: INTERNAL MEDICINE
Payer: MEDICARE

## 2022-08-08 DIAGNOSIS — N20.0 NEPHROLITHIASIS: ICD-10-CM

## 2022-08-08 LAB
BACTERIA #/AREA URNS AUTO: ABNORMAL /HPF
BILIRUB UR QL STRIP: NEGATIVE
CLARITY UR: ABNORMAL
COLOR UR: YELLOW
GLUCOSE UR QL STRIP: NEGATIVE
HGB UR QL STRIP: NEGATIVE
HYALINE CASTS UR QL AUTO: 3 /LPF
KETONES UR QL STRIP: NEGATIVE
LEUKOCYTE ESTERASE UR QL STRIP: ABNORMAL
MICROSCOPIC COMMENT: ABNORMAL
NITRITE UR QL STRIP: POSITIVE
PH UR STRIP: 6 [PH] (ref 5–8)
PROT UR QL STRIP: NEGATIVE
RBC #/AREA URNS AUTO: 1 /HPF (ref 0–4)
SP GR UR STRIP: 1.01 (ref 1–1.03)
SQUAMOUS #/AREA URNS AUTO: 1 /HPF
URN SPEC COLLECT METH UR: ABNORMAL
UROBILINOGEN UR STRIP-ACNC: NEGATIVE EU/DL
WBC #/AREA URNS AUTO: 25 /HPF (ref 0–5)

## 2022-08-08 PROCEDURE — 87186 SC STD MICRODIL/AGAR DIL: CPT | Performed by: INTERNAL MEDICINE

## 2022-08-08 PROCEDURE — 87086 URINE CULTURE/COLONY COUNT: CPT | Performed by: INTERNAL MEDICINE

## 2022-08-08 PROCEDURE — 87077 CULTURE AEROBIC IDENTIFY: CPT | Performed by: INTERNAL MEDICINE

## 2022-08-08 PROCEDURE — 81000 URINALYSIS NONAUTO W/SCOPE: CPT | Mod: PO | Performed by: INTERNAL MEDICINE

## 2022-08-08 PROCEDURE — 87088 URINE BACTERIA CULTURE: CPT | Performed by: INTERNAL MEDICINE

## 2022-08-10 ENCOUNTER — TELEPHONE (OUTPATIENT)
Dept: INTERNAL MEDICINE | Facility: CLINIC | Age: 77
End: 2022-08-10
Payer: MEDICARE

## 2022-08-10 DIAGNOSIS — N39.0 URINARY TRACT INFECTION WITHOUT HEMATURIA, SITE UNSPECIFIED: Primary | ICD-10-CM

## 2022-08-10 LAB — BACTERIA UR CULT: ABNORMAL

## 2022-08-10 RX ORDER — CIPROFLOXACIN 500 MG/1
500 TABLET ORAL 2 TIMES DAILY
Qty: 14 TABLET | Refills: 0 | Status: SHIPPED | OUTPATIENT
Start: 2022-08-10 | End: 2022-08-17

## 2022-08-10 NOTE — TELEPHONE ENCOUNTER
Please inform patient urine studies indicate a UTI.  cipro has been sent to pharmacy.  Also, advise to increase water intake.

## 2022-08-19 ENCOUNTER — OFFICE VISIT (OUTPATIENT)
Dept: ENDOCRINOLOGY | Facility: CLINIC | Age: 77
End: 2022-08-19
Payer: MEDICARE

## 2022-08-19 VITALS
SYSTOLIC BLOOD PRESSURE: 133 MMHG | HEART RATE: 70 BPM | WEIGHT: 159 LBS | HEIGHT: 62 IN | DIASTOLIC BLOOD PRESSURE: 66 MMHG | BODY MASS INDEX: 29.26 KG/M2

## 2022-08-19 DIAGNOSIS — M81.0 OSTEOPOROSIS, UNSPECIFIED OSTEOPOROSIS TYPE, UNSPECIFIED PATHOLOGICAL FRACTURE PRESENCE: ICD-10-CM

## 2022-08-19 DIAGNOSIS — M81.0 AGE-RELATED OSTEOPOROSIS WITHOUT CURRENT PATHOLOGICAL FRACTURE: Primary | ICD-10-CM

## 2022-08-19 PROCEDURE — 99204 PR OFFICE/OUTPT VISIT, NEW, LEVL IV, 45-59 MIN: ICD-10-PCS | Mod: S$GLB,,, | Performed by: GENERAL ACUTE CARE HOSPITAL

## 2022-08-19 PROCEDURE — 3075F PR MOST RECENT SYSTOLIC BLOOD PRESS GE 130-139MM HG: ICD-10-PCS | Mod: CPTII,S$GLB,, | Performed by: GENERAL ACUTE CARE HOSPITAL

## 2022-08-19 PROCEDURE — 1159F PR MEDICATION LIST DOCUMENTED IN MEDICAL RECORD: ICD-10-PCS | Mod: CPTII,S$GLB,, | Performed by: GENERAL ACUTE CARE HOSPITAL

## 2022-08-19 PROCEDURE — 1159F MED LIST DOCD IN RCRD: CPT | Mod: CPTII,S$GLB,, | Performed by: GENERAL ACUTE CARE HOSPITAL

## 2022-08-19 PROCEDURE — 99204 OFFICE O/P NEW MOD 45 MIN: CPT | Mod: S$GLB,,, | Performed by: GENERAL ACUTE CARE HOSPITAL

## 2022-08-19 PROCEDURE — 99999 PR PBB SHADOW E&M-EST. PATIENT-LVL V: ICD-10-PCS | Mod: PBBFAC,,, | Performed by: GENERAL ACUTE CARE HOSPITAL

## 2022-08-19 PROCEDURE — 1126F PR PAIN SEVERITY QUANTIFIED, NO PAIN PRESENT: ICD-10-PCS | Mod: CPTII,S$GLB,, | Performed by: GENERAL ACUTE CARE HOSPITAL

## 2022-08-19 PROCEDURE — 1160F RVW MEDS BY RX/DR IN RCRD: CPT | Mod: CPTII,S$GLB,, | Performed by: GENERAL ACUTE CARE HOSPITAL

## 2022-08-19 PROCEDURE — 3078F DIAST BP <80 MM HG: CPT | Mod: CPTII,S$GLB,, | Performed by: GENERAL ACUTE CARE HOSPITAL

## 2022-08-19 PROCEDURE — 1126F AMNT PAIN NOTED NONE PRSNT: CPT | Mod: CPTII,S$GLB,, | Performed by: GENERAL ACUTE CARE HOSPITAL

## 2022-08-19 PROCEDURE — 99999 PR PBB SHADOW E&M-EST. PATIENT-LVL V: CPT | Mod: PBBFAC,,, | Performed by: GENERAL ACUTE CARE HOSPITAL

## 2022-08-19 PROCEDURE — 3078F PR MOST RECENT DIASTOLIC BLOOD PRESSURE < 80 MM HG: ICD-10-PCS | Mod: CPTII,S$GLB,, | Performed by: GENERAL ACUTE CARE HOSPITAL

## 2022-08-19 PROCEDURE — 3075F SYST BP GE 130 - 139MM HG: CPT | Mod: CPTII,S$GLB,, | Performed by: GENERAL ACUTE CARE HOSPITAL

## 2022-08-19 PROCEDURE — 1160F PR REVIEW ALL MEDS BY PRESCRIBER/CLIN PHARMACIST DOCUMENTED: ICD-10-PCS | Mod: CPTII,S$GLB,, | Performed by: GENERAL ACUTE CARE HOSPITAL

## 2022-08-19 NOTE — PROGRESS NOTES
Subjective:      Patient ID: Winnie Ngo is a 77 y.o. female.    Chief Complaint:  Osteoporosis; Initial visit     Patient Active Problem List   Diagnosis    Hyperlipidemia: without diabetes ASCVD Risk 15.5%   with diabetes 27.9% ( elevated glucoses)    GERD (gastroesophageal reflux disease)    Osteopenia    Surgery, elective:Hysterectomy oophorectomy     Schatzki's ring    History of colon cancer    Mild vitamin D deficiency    Osteoarthritis of left knee, severe    Abnormal gastrointestinal PET scan    Bilateral dry eyes    Nuclear sclerotic cataract of both eyes    Mild persistent asthma    History of ductal carcinoma in situ (DCIS) of breast    Insomnia    Panic disorder (episodic paroxysmal anxiety)    Overweight (BMI 25.0-29.9)       History of Present Illness  76 YO Female w/ PMH as above that presents to the endocrine clinic for initial evaluation of Osteoporosis.  Pt today reports feels well and denies any complaints.            With regards to Osteoporosis:     -DXA 1/2022:      FINDINGS:  The L1 to L4 vertebral bone mineral density is equal to 0.921 g/cm squared with a T score of -2.2.     The left femoral neck bone mineral density is equal to 0.735 g/cm squared with a T score of -2.2.     The right femoral neck bone mineral density is equal to 0.743 g/cm squared with a T score of -2.1.     There is a 16.1% risk of a major osteoporotic fracture and a 4.9% risk of hip fracture in the next 10 years (FRAX).    Osteopenia with High FRAX     -Vitamin D level  WNL     -No evidence of hypercalcemia     -Has history of kidney stones.      -Fractures?   DENIES     -Falls?  DENIES     -Medications ?  Was on FOSAMAX for short time  And stopped due to GERD and Diarrhea     -Vitamin D and Calcium supplements?   Vit D 4,000 units daily.   No calcium due kidney stones in the past   -Decrease in height       -Mother had Kidney stones     -Not walking because of OA      -Steroids?  DENIES recent       -ETOH? DENIES   -Smoking?  DENIES     -PPIs?  yES     -Anti-epileptics?  Denies   -History of Radiation? DENIES   -Elevated Alk phos?  DENIES   -CAD?  DENIES     ROS:   As above    Objective:     There were no vitals taken for this visit.  BP Readings from Last 3 Encounters:   08/05/22 104/66   05/05/22 113/71   01/27/22 (!) 146/79     Wt Readings from Last 1 Encounters:   08/05/22 0845 72 kg (158 lb 11.7 oz)     There is no height or weight on file to calculate BMI.      Physical Exam  Vitals reviewed.   Constitutional:       General: She is not in acute distress.     Appearance: Normal appearance. She is well-developed. She is not ill-appearing.   HENT:      Nose: Nose normal. No rhinorrhea.      Mouth/Throat:      Mouth: Mucous membranes are moist.   Eyes:      Extraocular Movements: Extraocular movements intact.      Pupils: Pupils are equal, round, and reactive to light.      Comments: No proptosis, No lid lag, No conjunctival erythema    Neck:      Thyroid: No thyromegaly.      Trachea: No tracheal deviation.      Comments: No thyromegaly or Thyroid nodules palpated on exam   Cardiovascular:      Rate and Rhythm: Normal rate and regular rhythm.      Pulses: Normal pulses.   Pulmonary:      Effort: Pulmonary effort is normal.      Breath sounds: Normal breath sounds.   Abdominal:      Palpations: Abdomen is soft. There is no mass.      Tenderness: There is no abdominal tenderness.      Hernia: No hernia is present.   Musculoskeletal:         General: No swelling.      Cervical back: Neck supple. No tenderness.      Right lower leg: No edema.      Left lower leg: No edema.   Skin:     General: Skin is warm.      Findings: No rash.   Neurological:      General: No focal deficit present.      Mental Status: She is alert and oriented to person, place, and time.   Psychiatric:         Mood and Affect: Mood normal.         Judgment: Judgment normal.                Lab Review:   Lab Results   Component Value Date     HGBA1C 7.0 (H) 08/05/2022     Lab Results   Component Value Date    CHOL 151 08/05/2022    HDL 53 08/05/2022    LDLCALC 78.4 08/05/2022    TRIG 98 08/05/2022    CHOLHDL 35.1 08/05/2022     Lab Results   Component Value Date     08/05/2022    K 3.9 08/05/2022     08/05/2022    CO2 27 08/05/2022     (H) 08/05/2022    BUN 11 08/05/2022    CREATININE 0.7 08/05/2022    CALCIUM 10.3 08/05/2022    PROT 7.3 08/05/2022    ALBUMIN 4.1 08/05/2022    BILITOT 2.0 (H) 08/05/2022    ALKPHOS 62 08/05/2022    AST 17 08/05/2022    ALT 13 08/05/2022    ANIONGAP 10 08/05/2022    ESTGFRAFRICA >60.0 11/09/2021    EGFRNONAA >60.0 11/09/2021    TSH 2.428 08/05/2022     Vit D, 25-Hydroxy   Date Value Ref Range Status   05/06/2021 60 30 - 96 ng/mL Final     Comment:     Vitamin D deficiency.........<10 ng/mL                              Vitamin D insufficiency......10-29 ng/mL       Vitamin D sufficiency........> or equal to 30 ng/mL  Vitamin D toxicity............>100 ng/mL         Assessment and Plan       Age-related osteoporosis without current pathological fracture    -Send secondary osteoporosis work up  (PTH, Vitamin D, Renal panel, 24 Hr urine Ca/Cr)   -Ca + D supplements   -Fall precautions   -Weight bearing exercises     Pt is hesitant to start osteoporosis treatment, written information provided     Will re-discuss therapy once work up is completed     Will monitor

## 2022-08-19 NOTE — PATIENT INSTRUCTIONS
Due to your history of osteoporosis you are at increased risk of fractures and we need to start you on medications to prevent fractures and help increase your bone density.     Before we decide on treatment we will do blood and urine work to look for secondary and reversible causes of accelerated bone loss.      We will do a 24hr urine calcium collection,  You will need to collect urine for 24hr and keep it refrigerated.  Once the collection is completed you should go to any [facility] lab to return the urine and have blood work done the same day at the lab.      Continue taking  Vitamin D 4,000 units daily.     Fall precautions will be important to avoid fractures.     Weight bearing exercises will help increase your bone strength.     Once I have results I will contact you to go over next steps and possibly starting treatment then.      If any questions feel free to contact me.     Have a nice day.     Sincerely,       Juan David Ahumada MD   Endocrinology   8/19/2022 9:54 AM

## 2022-08-29 ENCOUNTER — TELEPHONE (OUTPATIENT)
Dept: ENDOCRINOLOGY | Facility: CLINIC | Age: 77
End: 2022-08-29
Payer: MEDICARE

## 2022-08-29 NOTE — TELEPHONE ENCOUNTER
----- Message from Mavis Gao sent at 8/29/2022  4:38 PM CDT -----  Type:  Needs Medical Advice    Who Called:pt  Symptoms (please be specific):  would like to get a call back to discuss instructions for 24 hour urine      Would the patient rather a call back or a response via [facility]? call  Best Call Back Number:  [phone]  Additional Information:

## 2022-08-30 ENCOUNTER — LAB VISIT (OUTPATIENT)
Dept: LAB | Facility: HOSPITAL | Age: 77
End: 2022-08-30
Attending: GENERAL ACUTE CARE HOSPITAL
Payer: MEDICARE

## 2022-08-30 DIAGNOSIS — M81.0 AGE-RELATED OSTEOPOROSIS WITHOUT CURRENT PATHOLOGICAL FRACTURE: ICD-10-CM

## 2022-08-30 LAB
CALCIUM 24H UR-MRATE: 5 MG/HR (ref 4–12)
CALCIUM UR-MCNC: 6.4 MG/DL (ref 0–15)
CALCIUM URINE (MG/SPEC): 115 MG/SPEC
CREAT CL/1.73 SQ M 12H UR+SERPL-ARVRAT: 64 ML/MIN (ref 70–110)
CREAT SERPL-MCNC: 0.7 MG/DL (ref 0.5–1.4)
CREAT UR-MCNC: 36 MG/DL (ref 15–325)
CREATININE, URINE (MG/SPEC): 648 MG/SPEC
URINE COLLECTION DURATION: 24 HR
URINE COLLECTION DURATION: 24 HR
URINE VOLUME: 1800 ML
URINE VOLUME: 1800 ML

## 2022-08-30 PROCEDURE — 82575 CREATININE CLEARANCE TEST: CPT | Performed by: GENERAL ACUTE CARE HOSPITAL

## 2022-08-30 PROCEDURE — 82340 ASSAY OF CALCIUM IN URINE: CPT | Performed by: GENERAL ACUTE CARE HOSPITAL

## 2022-09-01 ENCOUNTER — PATIENT MESSAGE (OUTPATIENT)
Dept: ENDOCRINOLOGY | Facility: CLINIC | Age: 77
End: 2022-09-01
Payer: MEDICARE

## 2022-09-01 NOTE — TELEPHONE ENCOUNTER
Osteoporosis work up is WNL       Will offer Reclast due to prior GERD on FOSAMAX.       Pt was hesitant to start treatment at last visit

## 2022-10-14 ENCOUNTER — IMMUNIZATION (OUTPATIENT)
Dept: PHARMACY | Facility: CLINIC | Age: 77
End: 2022-10-14
Payer: MEDICARE

## 2022-10-14 DIAGNOSIS — Z23 NEED FOR VACCINATION: Primary | ICD-10-CM

## 2022-11-15 ENCOUNTER — CLINICAL SUPPORT (OUTPATIENT)
Dept: FAMILY MEDICINE | Facility: CLINIC | Age: 77
End: 2022-11-15
Payer: MEDICARE

## 2022-11-15 DIAGNOSIS — Z23 NEED FOR INFLUENZA VACCINATION: Primary | ICD-10-CM

## 2022-11-15 PROCEDURE — 90694 VACC AIIV4 NO PRSRV 0.5ML IM: CPT | Mod: S$GLB,,, | Performed by: INTERNAL MEDICINE

## 2022-11-15 PROCEDURE — 90694 FLU VACCINE - QUADRIVALENT - ADJUVANTED: ICD-10-PCS | Mod: S$GLB,,, | Performed by: INTERNAL MEDICINE

## 2022-11-15 PROCEDURE — G0008 FLU VACCINE - QUADRIVALENT - ADJUVANTED: ICD-10-PCS | Mod: S$GLB,,, | Performed by: INTERNAL MEDICINE

## 2022-11-15 PROCEDURE — G0008 ADMIN INFLUENZA VIRUS VAC: HCPCS | Mod: S$GLB,,, | Performed by: INTERNAL MEDICINE

## 2022-12-12 ENCOUNTER — PES CALL (OUTPATIENT)
Dept: ADMINISTRATIVE | Facility: CLINIC | Age: 77
End: 2022-12-12
Payer: MEDICARE

## 2022-12-13 NOTE — TELEPHONE ENCOUNTER
No new care gaps identified.  [facility] Embedded Care Gaps. Reference number: [id number]. 12/13/2022   10:41:08 AM CST

## 2022-12-15 RX ORDER — ALBUTEROL SULFATE 90 UG/1
2 AEROSOL, METERED RESPIRATORY (INHALATION) EVERY 6 HOURS PRN
Qty: 18 G | Refills: 3 | Status: SHIPPED | OUTPATIENT
Start: 2022-12-15

## 2023-01-03 DIAGNOSIS — K21.9 GASTROESOPHAGEAL REFLUX DISEASE WITHOUT ESOPHAGITIS: ICD-10-CM

## 2023-01-03 RX ORDER — PANTOPRAZOLE SODIUM 20 MG/1
20 TABLET, DELAYED RELEASE ORAL DAILY
Qty: 90 TABLET | Refills: 3 | Status: SHIPPED | OUTPATIENT
Start: 2023-01-03 | End: 2023-12-28

## 2023-01-03 NOTE — TELEPHONE ENCOUNTER
Refill Routing Note   Medication(s) are not appropriate for processing by [facility] for the following reason(s):      - Medication not previously prescribed by PCP    [facility] action(s):  Defer          Medication reconciliation completed: No     Appointments  past 12m or future 3m with PCP    Date Provider   Last Visit   8/5/2022 Gill Campbell MD   Next Visit   2/10/2023 Gill Campbell MD   ED visits in past 90 days: 0        Note composed:4:01 PM 01/03/2023

## 2023-01-03 NOTE — TELEPHONE ENCOUNTER
No new care gaps identified.  [facility] Embedded Care Gaps. Reference number: [id number]. 1/03/2023   3:55:05 PM CST

## 2023-01-26 ENCOUNTER — TELEPHONE (OUTPATIENT)
Dept: INTERNAL MEDICINE | Facility: CLINIC | Age: 78
End: 2023-01-26
Payer: MEDICARE

## 2023-01-26 DIAGNOSIS — E11.9 CONTROLLED TYPE 2 DIABETES MELLITUS WITHOUT COMPLICATION, WITHOUT LONG-TERM CURRENT USE OF INSULIN: ICD-10-CM

## 2023-01-26 DIAGNOSIS — Z79.4 CONTROLLED TYPE 2 DIABETES MELLITUS WITHOUT COMPLICATION, WITH LONG-TERM CURRENT USE OF INSULIN: ICD-10-CM

## 2023-01-26 DIAGNOSIS — E11.9 CONTROLLED TYPE 2 DIABETES MELLITUS WITHOUT COMPLICATION, WITH LONG-TERM CURRENT USE OF INSULIN: ICD-10-CM

## 2023-01-26 NOTE — TELEPHONE ENCOUNTER
Patient states that the glucometer that she has is difficult to use and would like for the provider to order her and accucheck monitor, strips and lancets.

## 2023-01-27 RX ORDER — DEXTROSE 4 G
TABLET,CHEWABLE ORAL
Qty: 1 EACH | Refills: 0 | Status: SHIPPED | OUTPATIENT
Start: 2023-01-27

## 2023-01-27 RX ORDER — LANCETS
1 EACH MISCELLANEOUS DAILY
Qty: 100 EACH | Refills: 11 | Status: SHIPPED | OUTPATIENT
Start: 2023-01-27

## 2023-02-06 ENCOUNTER — HOSPITAL ENCOUNTER (OUTPATIENT)
Dept: RADIOLOGY | Facility: HOSPITAL | Age: 78
Discharge: HOME | End: 2023-02-06
Attending: INTERNAL MEDICINE
Payer: MEDICARE

## 2023-02-06 DIAGNOSIS — Z86.000 HISTORY OF DUCTAL CARCINOMA IN SITU (DCIS) OF BREAST: ICD-10-CM

## 2023-02-06 PROCEDURE — 77062 BREAST TOMOSYNTHESIS BI: CPT | Mod: 26,,, | Performed by: RADIOLOGY

## 2023-02-06 PROCEDURE — 77066 DX MAMMO INCL CAD BI: CPT | Mod: TC

## 2023-02-06 PROCEDURE — 76642 US BREAST LEFT LIMITED: ICD-10-PCS | Mod: 26,LT,, | Performed by: RADIOLOGY

## 2023-02-06 PROCEDURE — 77062 MAMMO DIGITAL DIAGNOSTIC BILAT WITH TOMO: ICD-10-PCS | Mod: 26,,, | Performed by: RADIOLOGY

## 2023-02-06 PROCEDURE — 77066 DX MAMMO INCL CAD BI: CPT | Mod: 26,,, | Performed by: RADIOLOGY

## 2023-02-06 PROCEDURE — 76642 ULTRASOUND BREAST LIMITED: CPT | Mod: 26,LT,, | Performed by: RADIOLOGY

## 2023-02-06 PROCEDURE — 76642 ULTRASOUND BREAST LIMITED: CPT | Mod: TC,LT

## 2023-02-06 PROCEDURE — 77066 MAMMO DIGITAL DIAGNOSTIC BILAT WITH TOMO: ICD-10-PCS | Mod: 26,,, | Performed by: RADIOLOGY

## 2023-02-07 ENCOUNTER — TELEPHONE (OUTPATIENT)
Dept: RADIOLOGY | Facility: HOSPITAL | Age: 78
End: 2023-02-07
Payer: MEDICARE

## 2023-02-07 NOTE — TELEPHONE ENCOUNTER
Spoke with patient. Reviewed breast biopsy procedure and reviewed instructions for breast biopsy. Patient expressed understanding and all questions were answered. Provided patient with my phone number to call for any further concerns or questions.   Patient scheduled breast biopsy at the [facility] on 2/16/2023.

## 2023-02-08 DIAGNOSIS — E78.5 HYPERLIPIDEMIA, UNSPECIFIED HYPERLIPIDEMIA TYPE: ICD-10-CM

## 2023-02-08 RX ORDER — ATORVASTATIN CALCIUM 10 MG/1
10 TABLET, FILM COATED ORAL NIGHTLY
Qty: 90 TABLET | Refills: 1 | Status: SHIPPED | OUTPATIENT
Start: 2023-02-08 | End: 2023-08-09

## 2023-02-08 NOTE — TELEPHONE ENCOUNTER
Refill Decision Note   Winnie Ngo  is requesting a refill authorization.  Brief Assessment and Rationale for Refill:  Approve     Medication Therapy Plan:       Medication Reconciliation Completed: No   Comments:     No Care Gaps recommended.     Note composed:2:38 PM 02/08/2023

## 2023-02-08 NOTE — TELEPHONE ENCOUNTER
No new care gaps identified.  [facility] Embedded Care Gaps. Reference number: [id number]. 2/08/2023   2:32:07 PM CST

## 2023-02-10 ENCOUNTER — PES CALL (OUTPATIENT)
Dept: ADMINISTRATIVE | Facility: CLINIC | Age: 78
End: 2023-02-10
Payer: MEDICARE

## 2023-02-16 ENCOUNTER — HOSPITAL ENCOUNTER (OUTPATIENT)
Dept: RADIOLOGY | Facility: HOSPITAL | Age: 78
Discharge: HOME | End: 2023-02-16
Attending: INTERNAL MEDICINE
Payer: MEDICARE

## 2023-02-16 DIAGNOSIS — R92.8 ABNORMAL FINDING ON BREAST IMAGING: ICD-10-CM

## 2023-02-16 PROCEDURE — A4648 IMPLANTABLE TISSUE MARKER: HCPCS

## 2023-02-16 PROCEDURE — 77065 DX MAMMO INCL CAD UNI: CPT | Mod: 26,LT,, | Performed by: RADIOLOGY

## 2023-02-16 PROCEDURE — 19083 BX BREAST 1ST LESION US IMAG: CPT | Mod: LT,,, | Performed by: RADIOLOGY

## 2023-02-16 PROCEDURE — 88360 TUMOR IMMUNOHISTOCHEM/MANUAL: CPT | Mod: 26,,, | Performed by: PATHOLOGY

## 2023-02-16 PROCEDURE — 88360 TUMOR IMMUNOHISTOCHEM/MANUAL: CPT | Performed by: PATHOLOGY

## 2023-02-16 PROCEDURE — 88305 TISSUE EXAM BY PATHOLOGIST: CPT | Performed by: PATHOLOGY

## 2023-02-16 PROCEDURE — 88360 PR  TUMOR IMMUNOHISTOCHEM/MANUAL: ICD-10-PCS | Mod: 26,,, | Performed by: PATHOLOGY

## 2023-02-16 PROCEDURE — 77065 MAMMO DIGITAL DIAGNOSTIC LEFT: ICD-10-PCS | Mod: 26,LT,, | Performed by: RADIOLOGY

## 2023-02-16 PROCEDURE — 19083 US BREAST BIOPSY WITH IMAGING 1ST SITE LEFT: ICD-10-PCS | Mod: LT,,, | Performed by: RADIOLOGY

## 2023-02-16 PROCEDURE — 27201068 US BREAST BIOPSY WITH IMAGING 1ST SITE LEFT

## 2023-02-16 PROCEDURE — 77065 DX MAMMO INCL CAD UNI: CPT | Mod: TC,LT

## 2023-02-16 PROCEDURE — 88305 TISSUE EXAM BY PATHOLOGIST: ICD-10-PCS | Mod: 26,,, | Performed by: PATHOLOGY

## 2023-02-16 PROCEDURE — 88305 TISSUE EXAM BY PATHOLOGIST: CPT | Mod: 26,,, | Performed by: PATHOLOGY

## 2023-02-16 PROCEDURE — 25000003 PHARM REV CODE 250: Performed by: INTERNAL MEDICINE

## 2023-02-16 RX ORDER — BUPIVACAINE HCL/EPINEPHRINE 0.25-.0005
10 VIAL (ML) INJECTION ONCE
Status: COMPLETED | OUTPATIENT
Start: 2023-02-16 | End: 2023-02-16

## 2023-02-16 RX ORDER — LIDOCAINE HYDROCHLORIDE 10 MG/ML
3 INJECTION, SOLUTION EPIDURAL; INFILTRATION; INTRACAUDAL; PERINEURAL ONCE
Status: COMPLETED | OUTPATIENT
Start: 2023-02-16 | End: 2023-02-16

## 2023-02-16 RX ADMIN — BUPIVACAINE HYDROCHLORIDE AND EPINEPHRINE BITARTRATE 10 ML: 2.5; .005 INJECTION, SOLUTION INFILTRATION; PERINEURAL at 10:02

## 2023-02-16 RX ADMIN — LIDOCAINE HYDROCHLORIDE 3 ML: 10 INJECTION, SOLUTION EPIDURAL; INFILTRATION; INTRACAUDAL; PERINEURAL at 10:02

## 2023-02-20 ENCOUNTER — TELEPHONE (OUTPATIENT)
Dept: HEMATOLOGY/ONCOLOGY | Facility: CLINIC | Age: 78
End: 2023-02-20
Payer: MEDICARE

## 2023-02-20 ENCOUNTER — DOCUMENTATION ONLY (OUTPATIENT)
Dept: HEMATOLOGY/ONCOLOGY | Facility: CLINIC | Age: 78
End: 2023-02-20
Payer: MEDICARE

## 2023-02-20 LAB
FINAL PATHOLOGIC DIAGNOSIS: NORMAL
GROSS: NORMAL
Lab: NORMAL
SUPPLEMENTAL DIAGNOSIS: NORMAL

## 2023-02-20 NOTE — TELEPHONE ENCOUNTER
Called Patient with results of breast biopsy from 2/16/23.  Explained that the biopsy showed invasive ductal carcinoma . Discussed what this means and that the next step is to meet with a breast surgeon. An appt was made for 2/28/23 with Dr. Cabrera.  Reviewed location of breast center. Patient verbalized understanding.       ----- Message from Kristy Warner MD sent at 2/17/2023  3:53 PM CST -----  Malignant and concordant. Surgical consultation.

## 2023-02-20 NOTE — PROGRESS NOTES
Called Patient with results of breast biopsy from 2/16/23.  Explained that the biopsy showed invasive ductal carcinoma . Discussed what this means and that the next step is to meet with a breast surgeon. An appt was made for 2/28/23 with Dr. Cabrera.  Reviewed location of breast center. Patient verbalized understanding.     Oncology Navigation   Intake  Date of Diagnosis: 02/16/23  Cancer Type: Breast  Internal / External Referral: Internal  Date of Referral: 02/07/23  Initial Nurse Navigator Contact: 02/20/23  Referral to Initial Contact Timeline (days): 13  First Appointment Available: 02/28/23  Appointment Date: 02/28/23  First Available Date vs. Scheduled Date (days): 0     Treatment  Current Status: Staging work-up    Surgical Oncologist: Deborah  Consult Date: 02/28/23          Procedures: Biopsy; Mammogram; Ultrasound  Biopsy Schedule Date: 02/16/23  Mammo Schedule Date: 02/16/23  Ultrasound Schedule Date: 02/16/23             Support Systems: Family members     Acuity      Follow Up  No follow-ups on file.

## 2023-02-27 NOTE — H&P (VIEW-ONLY)
Breast Surgery  [facility]  Department of Surgery      REFERRING PROVIDER: Dr. Gill Campbell    Chief Complaint: Breast Cancer (New Patient Left Breast Invasive Ductal Carcinoma 2/16/23 . Prior patient of Reji.)      Subjective:      Patient ID: Winnie Ngo is a 78 y.o. female who presents with left breast mass found on diagnostic mammogram and ultrasound 2/6/23. 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     Diagnostic mammogram showed a mass, approx 13 mm, at the 3 o'clock position that was also seen in ultrasound in the same 3 o'clock position 2 cm from the nipple measuring 12 x 6 x 8 mm. An additional mass measuring 8 x 10 x 7 mm was seen 5 cm from the nipple which is stable from prior imaging. An ultrasound guided biopsy was performed on 2/16 with pathology revealing infiltrating ductal carcinoma of the left breast.     Patient does routinely do self breast exams.  Patient has not noted a change on breast exam.  Patient denies nipple discharge. Patient has previous breast biopsy. Right breast lumpectomy in the 1980's for a benign mass. Patient has a personal history of breast cancer.    Findings at that time were the following:   Tumor size: 12 x 6 x 8 mm.   3 o'clock position, 5 cm from the nipple   Twirl marker placed during biopsy  Tumor ndgndrndanddndend:nd nd2nd Estrogen Receptor: + (100%, strong)  Progesterone Receptor: + Positive (100%, strong)  Her-2 thomas: -   Lymph node status: clinically negative   Lymphatic invasion: none noted     Breast Cancer History:  Stage 0 pTis Nx Mx grade 3 ER + IN + DCIS of the left breast.  Treatment:  Initial left partial mastectomy on 10/21/2016. Left partial mastectomy re-excision on 12/02/2016 for close superior margin. Dr. Reji MD Surgical Oncology  Patient declined XRT and endocrine therapy    GYN History:  Age of menarche was 12. Age of menopause was 34 after hysterectomy with BSO. Patient  reports hormonal therapy - estrogen therapy for approx 5 years. Patient is . Age of first live birth was 22. Patient did not breast feed.    Past Medical History:   Diagnosis Date    Allergy     Anxiety     Asthma     Benign neoplasm of colon     Breast cancer     Cancer     colon    Cataract     Chronic instability of knee, unspecified knee     rt knee cap displaced    Colon cancer     COPD (chronic obstructive pulmonary disease)     Diabetes mellitus     Dry eye syndrome     Fever blister     GERD (gastroesophageal reflux disease)     Hyperlipidemia     Joint pain     Osteopenia     Personal history of malignant neoplasm of large intestine     PONV (postoperative nausea and vomiting)      Past Surgical History:   Procedure Laterality Date    APPENDECTOMY      BREAST BIOPSY Right     exc bx     BREAST BIOPSY Left 2016    lumpectomy    BREAST BIOPSY Right     BREAST LUMPECTOMY Left 2016    CHOLECYSTECTOMY      COLON SURGERY      stage T1 N0 M0    EYE SURGERY      HYSTERECTOMY      KNEE SURGERY      arthroscopic    NASAL SEPTUM SURGERY      with removal of polyps    sigmoid colectomy      SINUS SURGERY      TOTAL ABDOMINAL HYSTERECTOMY W/ BILATERAL SALPINGOOPHORECTOMY      UPPER ENDOSCOPY W/ BANDING      Schatzki's ring dialated 2 sessile polyps in stomach     Current Outpatient Medications on File Prior to Visit   Medication Sig Dispense Refill    albuterol (PROVENTIL/VENTOLIN HFA) 90 mcg/actuation inhaler Inhale 2 puffs into the lungs every 6 (six) hours as needed for Wheezing. May substitute any albuterol inhaler on her plan. 18 g 3    atorvastatin (LIPITOR) 10 MG tablet Take 1 tablet (10 mg total) by mouth every evening. 90 tablet 1    cholecalciferol, vitamin D3, (VITAMIN D3) 25 mcg (1,000 unit) capsule Take 4,000 Units by mouth once daily. Per Dr. Flores      fluticasone-salmeterol diskus inhaler 100-50 mcg Inhale 1 puff into the lungs 2 (two) times daily. 60 each 6    LORazepam (ATIVAN) 1 MG  "tablet TAKE 1 TABLET(1 MG) BY MOUTH EVERY NIGHT AS NEEDED FOR ANXIETY 30 tablet 3    melatonin 1 mg Tab Take 1 tablet by mouth every evening.      pantoprazole (PROTONIX) 20 MG tablet Take 1 tablet (20 mg total) by mouth once daily. 90 tablet 3    blood sugar diagnostic (BLOOD GLUCOSE TEST) Strp One test strip per glucose testing twice a day: Insurance Brand Preference (Patient not taking: Reported on 2/28/2023) 180 strip 3    blood-glucose meter Misc Dispense one meter: Insurance Brand Preference (Patient not taking: Reported on 2/28/2023) 1 each 0    lancets Misc 1 each by Misc.(Non-Drug; Combo Route) route once daily. (Patient not taking: Reported on 2/28/2023) 100 each 11     No current facility-administered medications on file prior to visit.     Social History     Socioeconomic History    Marital status:    Tobacco Use    Smoking status: Never    Smokeless tobacco: Never   Substance and Sexual Activity    Alcohol use: No    Drug use: No    Sexual activity: Never   Other Topics Concern    Are you pregnant or think you may be? No    Breast-feeding No     Family History   Problem Relation Age of Onset    Alzheimer's disease Mother     Kidney disease Mother         kidney stone    Heart disease Father 41        MI    Stroke Father     Cancer Brother         leg    Cancer Brother         Kidney cancer    Cancer Maternal Uncle     Melanoma Neg Hx         Review of Systems  Constitutional:  Negative for chills and fever.   Respiratory:  Negative for shortness of breath.    Cardiovascular:  Negative for chest pain.   Gastrointestinal:  Negative for abdominal pain, nausea and vomiting.   Musculoskeletal:  Negative for back pain.   Neurological:  Negative for dizziness and syncope.     Objective:   BP (!) 142/67 (BP Location: Left arm, Patient Position: Sitting, BP Method: Medium (Automatic))   Pulse 76   Temp 98.4 °F (36.9 °C) (Oral)   Ht 5' 2" (1.575 m)   Wt 71.7 kg (158 lb)   SpO2 95%   BMI 28.90 kg/m² "     Physical Exam   Constitutional: She does not appear ill. No distress.   HENT:   Head: Normocephalic and atraumatic.   Eyes: Conjunctivae are normal.   Cardiovascular:  Normal rate and regular rhythm.            Pulmonary/Chest: Effort normal. No respiratory distress. Right breast exhibits no inverted nipple, no mass and no nipple discharge. Left breast exhibits inverted nipple. Left breast exhibits no mass and no nipple discharge.   Well healed scar on right and left breasts from pervious lumpectomy   Abdominal: Soft. Normal appearance. She exhibits no distension.   Musculoskeletal: Normal range of motion.   Neurological: She is alert.   Skin: Skin is warm and dry.       Radiology review: Images personally reviewed by me in the clinic.   Diagnostic Mammogram and Ultrasound: 2/6/23  Findings:  This procedure was performed using tomosynthesis. Computer-aided detection was utilized in the interpretation of this examination.  The breasts are heterogeneously dense, which may obscure small masses.  In the left breast at 03:00 o'clock posterior depth there is an oval, equal density mass with a slightly spiculated margin that measures approximately 13 mm.  It is located approximately 1 cm posterior to a focal asymmetry in this region that has been stable on multiple prior mammograms.  No additional suspicious finding is present in either breast.     Ultrasound images of the left breast at 03:00 o'clock 5 cm from the nipple demonstrate an irregular, hypoechoic mass with a microlobulated margin that measures 8 x 10 x 7 mm.  This corresponds to the new mammographic mass in this region.  Also in this region, at 03:00 o'clock 2 centimeters from the nipple there is an oval, heterogeneous mass that measures 12 x 6 x 8 mm; this corresponds to the focal asymmetry that is stable on mammography.  Images of the left axilla are unremarkable.     Impression:  Suspicious mass in the left breast at 03:00 o'clock 5 cm from the nipple.   Ultrasound-guided biopsy is recommended.     BI-RADS Category:   Overall: 4 - Suspicious    Assessment:       1. Malignant neoplasm of overlapping sites of left breast in female, estrogen receptor positive    2. Pre-op testing    3. History of ductal carcinoma in situ (DCIS) of breast          Plan:     Options for management were discussed with the patient and her family. We reviewed the existing data noting the equivalency of breast conserving surgery with radiation therapy and mastectomy. We also reviewed the guidelines of the National Comprehensive Cancer Network for Stage 1A breast carcinoma. We discussed the need for lumpectomy margins to be negative for carcinoma, the necessity for postoperative radiation therapy after breast conservation in most cases, the possibility of a failed or false negative sentinel lymph node biopsy and the potential need for complete lymphadenectomy for a failed or positive sentinel lymph node biopsy were fully discussed. In the setting of mastectomy, delayed or immediate reconstruction options are available and were discussed.     In the setting of lumpectomy, radiation therapy would be recommended majority of the time.  The duration and treatment side effects were discussed with the patient.  This will coordinated with the radiation oncologist pending final pathology.    We also discussed the role of systemic therapy in the treatment of early stage breast cancer.  We discussed that this is based on tumor biology and emma status and will be determined based on final pathology.  We discussed that if the cancer is hormone positive, endocrine therapy would be recommended in most cases and its use can reduce the risk of recurrence as well as improve survival. Side effects of treatment were briefly discussed. We also discussed the potential role for chemotherapy based on a number of factors such as tumor phenotype (ER+ vs. triple negative vs. [id number]+) and this would be determined in  coordination with the medical oncologist.    The patient, in consultation with her family, has elected to proceed with left total mastectomy and sentinel lymph node biopsy. The operative risks of bleeding, infection, recurrence, scarring, and anesthetic complications and the possibility of requiring further surgery were all noted and informed consent obtained.    Surgery scheduled. Follow-up in clinic roughly 14 days after surgery.     Patient was educated on breast cancer, receptors, wire localization lumpectomy, mastectomy, sentinel lymph node mapping and biopsy, axillary lymph node dissection, reconstruction, breast prosthesis with post-mastectomy bra and radiation therapy. Patient was given patient information binder including [facility] breast cancer treatment brochure.  All her questions were answered.    Will plan for left total mastectomy with SLNB, possible ALND. Also discussed genetic testing with patient which was performed today.    Total time spent with the patient: 65 minutes.  45 minutes of face to face consultation and 20 minutes of chart review and coordination of care.

## 2023-02-27 NOTE — PROGRESS NOTES
Breast Surgery  [facility]  Department of Surgery      REFERRING PROVIDER: Dr. Gill Campbell    Chief Complaint: Breast Cancer (New Patient Left Breast Invasive Ductal Carcinoma 2/16/23 . Prior patient of Reji.)      Subjective:      Patient ID: Winnie Ngo is a 78 y.o. female who presents with left breast mass found on diagnostic mammogram and ultrasound 2/6/23. 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     Diagnostic mammogram showed a mass, approx 13 mm, at the 3 o'clock position that was also seen in ultrasound in the same 3 o'clock position 2 cm from the nipple measuring 12 x 6 x 8 mm. An additional mass measuring 8 x 10 x 7 mm was seen 5 cm from the nipple which is stable from prior imaging. An ultrasound guided biopsy was performed on 2/16 with pathology revealing infiltrating ductal carcinoma of the left breast.     Patient does routinely do self breast exams.  Patient has not noted a change on breast exam.  Patient denies nipple discharge. Patient has previous breast biopsy. Right breast lumpectomy in the 1980's for a benign mass. Patient has a personal history of breast cancer.    Findings at that time were the following:   Tumor size: 12 x 6 x 8 mm.   3 o'clock position, 5 cm from the nipple   Twirl marker placed during biopsy  Tumor ndgndrndanddndend:nd nd2nd Estrogen Receptor: + (100%, strong)  Progesterone Receptor: + Positive (100%, strong)  Her-2 thomas: -   Lymph node status: clinically negative   Lymphatic invasion: none noted     Breast Cancer History:  Stage 0 pTis Nx Mx grade 3 ER + CO + DCIS of the left breast.  Treatment:  Initial left partial mastectomy on 10/21/2016. Left partial mastectomy re-excision on 12/02/2016 for close superior margin. Dr. Reji MD Surgical Oncology  Patient declined XRT and endocrine therapy    GYN History:  Age of menarche was 12. Age of menopause was 34 after hysterectomy with BSO. Patient  reports hormonal therapy - estrogen therapy for approx 5 years. Patient is . Age of first live birth was 22. Patient did not breast feed.    Past Medical History:   Diagnosis Date    Allergy     Anxiety     Asthma     Benign neoplasm of colon     Breast cancer     Cancer     colon    Cataract     Chronic instability of knee, unspecified knee     rt knee cap displaced    Colon cancer     COPD (chronic obstructive pulmonary disease)     Diabetes mellitus     Dry eye syndrome     Fever blister     GERD (gastroesophageal reflux disease)     Hyperlipidemia     Joint pain     Osteopenia     Personal history of malignant neoplasm of large intestine     PONV (postoperative nausea and vomiting)      Past Surgical History:   Procedure Laterality Date    APPENDECTOMY      BREAST BIOPSY Right     exc bx     BREAST BIOPSY Left 2016    lumpectomy    BREAST BIOPSY Right     BREAST LUMPECTOMY Left 2016    CHOLECYSTECTOMY      COLON SURGERY      stage T1 N0 M0    EYE SURGERY      HYSTERECTOMY      KNEE SURGERY      arthroscopic    NASAL SEPTUM SURGERY      with removal of polyps    sigmoid colectomy      SINUS SURGERY      TOTAL ABDOMINAL HYSTERECTOMY W/ BILATERAL SALPINGOOPHORECTOMY      UPPER ENDOSCOPY W/ BANDING      Schatzki's ring dialated 2 sessile polyps in stomach     Current Outpatient Medications on File Prior to Visit   Medication Sig Dispense Refill    albuterol (PROVENTIL/VENTOLIN HFA) 90 mcg/actuation inhaler Inhale 2 puffs into the lungs every 6 (six) hours as needed for Wheezing. May substitute any albuterol inhaler on her plan. 18 g 3    atorvastatin (LIPITOR) 10 MG tablet Take 1 tablet (10 mg total) by mouth every evening. 90 tablet 1    cholecalciferol, vitamin D3, (VITAMIN D3) 25 mcg (1,000 unit) capsule Take 4,000 Units by mouth once daily. Per Dr. Flores      fluticasone-salmeterol diskus inhaler 100-50 mcg Inhale 1 puff into the lungs 2 (two) times daily. 60 each 6    LORazepam (ATIVAN) 1 MG  "tablet TAKE 1 TABLET(1 MG) BY MOUTH EVERY NIGHT AS NEEDED FOR ANXIETY 30 tablet 3    melatonin 1 mg Tab Take 1 tablet by mouth every evening.      pantoprazole (PROTONIX) 20 MG tablet Take 1 tablet (20 mg total) by mouth once daily. 90 tablet 3    blood sugar diagnostic (BLOOD GLUCOSE TEST) Strp One test strip per glucose testing twice a day: Insurance Brand Preference (Patient not taking: Reported on 2/28/2023) 180 strip 3    blood-glucose meter Misc Dispense one meter: Insurance Brand Preference (Patient not taking: Reported on 2/28/2023) 1 each 0    lancets Misc 1 each by Misc.(Non-Drug; Combo Route) route once daily. (Patient not taking: Reported on 2/28/2023) 100 each 11     No current facility-administered medications on file prior to visit.     Social History     Socioeconomic History    Marital status:    Tobacco Use    Smoking status: Never    Smokeless tobacco: Never   Substance and Sexual Activity    Alcohol use: No    Drug use: No    Sexual activity: Never   Other Topics Concern    Are you pregnant or think you may be? No    Breast-feeding No     Family History   Problem Relation Age of Onset    Alzheimer's disease Mother     Kidney disease Mother         kidney stone    Heart disease Father 41        MI    Stroke Father     Cancer Brother         leg    Cancer Brother         Kidney cancer    Cancer Maternal Uncle     Melanoma Neg Hx         Review of Systems  Constitutional:  Negative for chills and fever.   Respiratory:  Negative for shortness of breath.    Cardiovascular:  Negative for chest pain.   Gastrointestinal:  Negative for abdominal pain, nausea and vomiting.   Musculoskeletal:  Negative for back pain.   Neurological:  Negative for dizziness and syncope.     Objective:   BP (!) 142/67 (BP Location: Left arm, Patient Position: Sitting, BP Method: Medium (Automatic))   Pulse 76   Temp 98.4 °F (36.9 °C) (Oral)   Ht 5' 2" (1.575 m)   Wt 71.7 kg (158 lb)   SpO2 95%   BMI 28.90 kg/m² "     Physical Exam   Constitutional: She does not appear ill. No distress.   HENT:   Head: Normocephalic and atraumatic.   Eyes: Conjunctivae are normal.   Cardiovascular:  Normal rate and regular rhythm.            Pulmonary/Chest: Effort normal. No respiratory distress. Right breast exhibits no inverted nipple, no mass and no nipple discharge. Left breast exhibits inverted nipple. Left breast exhibits no mass and no nipple discharge.   Well healed scar on right and left breasts from pervious lumpectomy   Abdominal: Soft. Normal appearance. She exhibits no distension.   Musculoskeletal: Normal range of motion.   Neurological: She is alert.   Skin: Skin is warm and dry.       Radiology review: Images personally reviewed by me in the clinic.   Diagnostic Mammogram and Ultrasound: 2/6/23  Findings:  This procedure was performed using tomosynthesis. Computer-aided detection was utilized in the interpretation of this examination.  The breasts are heterogeneously dense, which may obscure small masses.  In the left breast at 03:00 o'clock posterior depth there is an oval, equal density mass with a slightly spiculated margin that measures approximately 13 mm.  It is located approximately 1 cm posterior to a focal asymmetry in this region that has been stable on multiple prior mammograms.  No additional suspicious finding is present in either breast.     Ultrasound images of the left breast at 03:00 o'clock 5 cm from the nipple demonstrate an irregular, hypoechoic mass with a microlobulated margin that measures 8 x 10 x 7 mm.  This corresponds to the new mammographic mass in this region.  Also in this region, at 03:00 o'clock 2 centimeters from the nipple there is an oval, heterogeneous mass that measures 12 x 6 x 8 mm; this corresponds to the focal asymmetry that is stable on mammography.  Images of the left axilla are unremarkable.     Impression:  Suspicious mass in the left breast at 03:00 o'clock 5 cm from the nipple.   Ultrasound-guided biopsy is recommended.     BI-RADS Category:   Overall: 4 - Suspicious    Assessment:       1. Malignant neoplasm of overlapping sites of left breast in female, estrogen receptor positive    2. Pre-op testing    3. History of ductal carcinoma in situ (DCIS) of breast          Plan:     Options for management were discussed with the patient and her family. We reviewed the existing data noting the equivalency of breast conserving surgery with radiation therapy and mastectomy. We also reviewed the guidelines of the National Comprehensive Cancer Network for Stage 1A breast carcinoma. We discussed the need for lumpectomy margins to be negative for carcinoma, the necessity for postoperative radiation therapy after breast conservation in most cases, the possibility of a failed or false negative sentinel lymph node biopsy and the potential need for complete lymphadenectomy for a failed or positive sentinel lymph node biopsy were fully discussed. In the setting of mastectomy, delayed or immediate reconstruction options are available and were discussed.     In the setting of lumpectomy, radiation therapy would be recommended majority of the time.  The duration and treatment side effects were discussed with the patient.  This will coordinated with the radiation oncologist pending final pathology.    We also discussed the role of systemic therapy in the treatment of early stage breast cancer.  We discussed that this is based on tumor biology and emma status and will be determined based on final pathology.  We discussed that if the cancer is hormone positive, endocrine therapy would be recommended in most cases and its use can reduce the risk of recurrence as well as improve survival. Side effects of treatment were briefly discussed. We also discussed the potential role for chemotherapy based on a number of factors such as tumor phenotype (ER+ vs. triple negative vs. Bbx1epc+) and this would be determined in  coordination with the medical oncologist.    The patient, in consultation with her family, has elected to proceed with left total mastectomy and sentinel lymph node biopsy. The operative risks of bleeding, infection, recurrence, scarring, and anesthetic complications and the possibility of requiring further surgery were all noted and informed consent obtained.    Surgery scheduled. Follow-up in clinic roughly 14 days after surgery.     Patient was educated on breast cancer, receptors, wire localization lumpectomy, mastectomy, sentinel lymph node mapping and biopsy, axillary lymph node dissection, reconstruction, breast prosthesis with post-mastectomy bra and radiation therapy. Patient was given patient information binder including [facility] breast cancer treatment brochure.  All her questions were answered.    Will plan for left total mastectomy with SLNB, possible ALND. Also discussed genetic testing with patient which was performed today.    Total time spent with the patient: 65 minutes.  45 minutes of face to face consultation and 20 minutes of chart review and coordination of care.

## 2023-02-28 ENCOUNTER — DOCUMENTATION ONLY (OUTPATIENT)
Dept: SURGERY | Facility: CLINIC | Age: 78
End: 2023-02-28
Payer: MEDICARE

## 2023-02-28 ENCOUNTER — OFFICE VISIT (OUTPATIENT)
Dept: SURGERY | Facility: CLINIC | Age: 78
End: 2023-02-28
Payer: MEDICARE

## 2023-02-28 ENCOUNTER — LAB VISIT (OUTPATIENT)
Dept: LAB | Facility: HOSPITAL | Age: 78
End: 2023-02-28
Attending: SURGERY
Payer: MEDICARE

## 2023-02-28 VITALS
BODY MASS INDEX: 29.08 KG/M2 | DIASTOLIC BLOOD PRESSURE: 67 MMHG | HEIGHT: 62 IN | SYSTOLIC BLOOD PRESSURE: 142 MMHG | OXYGEN SATURATION: 95 % | WEIGHT: 158 LBS | HEART RATE: 76 BPM | TEMPERATURE: 98 F

## 2023-02-28 DIAGNOSIS — Z86.000 HISTORY OF DUCTAL CARCINOMA IN SITU (DCIS) OF BREAST: ICD-10-CM

## 2023-02-28 DIAGNOSIS — C50.912 INVASIVE DUCTAL CARCINOMA OF BREAST, LEFT: ICD-10-CM

## 2023-02-28 DIAGNOSIS — Z17.0 MALIGNANT NEOPLASM OF OVERLAPPING SITES OF LEFT BREAST IN FEMALE, ESTROGEN RECEPTOR POSITIVE: Primary | ICD-10-CM

## 2023-02-28 DIAGNOSIS — C50.812 MALIGNANT NEOPLASM OF OVERLAPPING SITES OF LEFT BREAST IN FEMALE, ESTROGEN RECEPTOR POSITIVE: Primary | ICD-10-CM

## 2023-02-28 DIAGNOSIS — C50.912 INVASIVE DUCTAL CARCINOMA OF BREAST, LEFT: Primary | ICD-10-CM

## 2023-02-28 DIAGNOSIS — Z01.818 PRE-OP TESTING: ICD-10-CM

## 2023-02-28 LAB
ALBUMIN SERPL BCP-MCNC: 4.2 G/DL (ref 3.5–5.2)
ALP SERPL-CCNC: 79 U/L (ref 55–135)
ALT SERPL W/O P-5'-P-CCNC: 13 U/L (ref 10–44)
ANION GAP SERPL CALC-SCNC: 9 MMOL/L (ref 8–16)
AST SERPL-CCNC: 17 U/L (ref 10–40)
BASOPHILS # BLD AUTO: 0.05 K/UL (ref 0–0.2)
BASOPHILS NFR BLD: 0.8 % (ref 0–1.9)
BILIRUB SERPL-MCNC: 1.8 MG/DL (ref 0.1–1)
BUN SERPL-MCNC: 9 MG/DL (ref 8–23)
CALCIUM SERPL-MCNC: 10 MG/DL (ref 8.7–10.5)
CHLORIDE SERPL-SCNC: 104 MMOL/L (ref 95–110)
CO2 SERPL-SCNC: 28 MMOL/L (ref 23–29)
CREAT SERPL-MCNC: 0.8 MG/DL (ref 0.5–1.4)
DIFFERENTIAL METHOD: ABNORMAL
EOSINOPHIL # BLD AUTO: 0.2 K/UL (ref 0–0.5)
EOSINOPHIL NFR BLD: 3.5 % (ref 0–8)
ERYTHROCYTE [DISTWIDTH] IN BLOOD BY AUTOMATED COUNT: 12 % (ref 11.5–14.5)
EST. GFR  (NO RACE VARIABLE): >60 ML/MIN/1.73 M^2
GLUCOSE SERPL-MCNC: 170 MG/DL (ref 70–110)
HCT VFR BLD AUTO: 40.6 % (ref 37–48.5)
HGB BLD-MCNC: 13.3 G/DL (ref 12–16)
IMM GRANULOCYTES # BLD AUTO: 0.03 K/UL (ref 0–0.04)
IMM GRANULOCYTES NFR BLD AUTO: 0.5 % (ref 0–0.5)
LYMPHOCYTES # BLD AUTO: 1.6 K/UL (ref 1–4.8)
LYMPHOCYTES NFR BLD: 24.3 % (ref 18–48)
MCH RBC QN AUTO: 32.4 PG (ref 27–31)
MCHC RBC AUTO-ENTMCNC: 32.8 G/DL (ref 32–36)
MCV RBC AUTO: 99 FL (ref 82–98)
MONOCYTES # BLD AUTO: 0.4 K/UL (ref 0.3–1)
MONOCYTES NFR BLD: 6.3 % (ref 4–15)
NEUTROPHILS # BLD AUTO: 4.2 K/UL (ref 1.8–7.7)
NEUTROPHILS NFR BLD: 64.6 % (ref 38–73)
NRBC BLD-RTO: 0 /100 WBC
PLATELET # BLD AUTO: 176 K/UL (ref 150–450)
PMV BLD AUTO: 10.9 FL (ref 9.2–12.9)
POTASSIUM SERPL-SCNC: 4 MMOL/L (ref 3.5–5.1)
PROT SERPL-MCNC: 7.5 G/DL (ref 6–8.4)
RBC # BLD AUTO: 4.1 M/UL (ref 4–5.4)
SODIUM SERPL-SCNC: 141 MMOL/L (ref 136–145)
WBC # BLD AUTO: 6.54 K/UL (ref 3.9–12.7)

## 2023-02-28 PROCEDURE — 1159F PR MEDICATION LIST DOCUMENTED IN MEDICAL RECORD: ICD-10-PCS | Mod: CPTII,S$GLB,, | Performed by: SURGERY

## 2023-02-28 PROCEDURE — 1101F PT FALLS ASSESS-DOCD LE1/YR: CPT | Mod: CPTII,S$GLB,, | Performed by: SURGERY

## 2023-02-28 PROCEDURE — 1126F PR PAIN SEVERITY QUANTIFIED, NO PAIN PRESENT: ICD-10-PCS | Mod: CPTII,S$GLB,, | Performed by: SURGERY

## 2023-02-28 PROCEDURE — 3078F PR MOST RECENT DIASTOLIC BLOOD PRESSURE < 80 MM HG: ICD-10-PCS | Mod: CPTII,S$GLB,, | Performed by: SURGERY

## 2023-02-28 PROCEDURE — 3078F DIAST BP <80 MM HG: CPT | Mod: CPTII,S$GLB,, | Performed by: SURGERY

## 2023-02-28 PROCEDURE — 99999 PR PBB SHADOW E&M-EST. PATIENT-LVL V: ICD-10-PCS | Mod: PBBFAC,,, | Performed by: SURGERY

## 2023-02-28 PROCEDURE — 3077F SYST BP >= 140 MM HG: CPT | Mod: CPTII,S$GLB,, | Performed by: SURGERY

## 2023-02-28 PROCEDURE — 3288F PR FALLS RISK ASSESSMENT DOCUMENTED: ICD-10-PCS | Mod: CPTII,S$GLB,, | Performed by: SURGERY

## 2023-02-28 PROCEDURE — 99205 PR OFFICE/OUTPT VISIT, NEW, LEVL V, 60-74 MIN: ICD-10-PCS | Mod: S$GLB,,, | Performed by: SURGERY

## 2023-02-28 PROCEDURE — 3072F PR LOW RISK FOR RETINOPATHY: ICD-10-PCS | Mod: CPTII,S$GLB,, | Performed by: SURGERY

## 2023-02-28 PROCEDURE — 3077F PR MOST RECENT SYSTOLIC BLOOD PRESSURE >= 140 MM HG: ICD-10-PCS | Mod: CPTII,S$GLB,, | Performed by: SURGERY

## 2023-02-28 PROCEDURE — 99999 PR PBB SHADOW E&M-EST. PATIENT-LVL V: CPT | Mod: PBBFAC,,, | Performed by: SURGERY

## 2023-02-28 PROCEDURE — 3072F LOW RISK FOR RETINOPATHY: CPT | Mod: CPTII,S$GLB,, | Performed by: SURGERY

## 2023-02-28 PROCEDURE — 1159F MED LIST DOCD IN RCRD: CPT | Mod: CPTII,S$GLB,, | Performed by: SURGERY

## 2023-02-28 PROCEDURE — 3288F FALL RISK ASSESSMENT DOCD: CPT | Mod: CPTII,S$GLB,, | Performed by: SURGERY

## 2023-02-28 PROCEDURE — 1160F RVW MEDS BY RX/DR IN RCRD: CPT | Mod: CPTII,S$GLB,, | Performed by: SURGERY

## 2023-02-28 PROCEDURE — 99205 OFFICE O/P NEW HI 60 MIN: CPT | Mod: S$GLB,,, | Performed by: SURGERY

## 2023-02-28 PROCEDURE — 36415 COLL VENOUS BLD VENIPUNCTURE: CPT | Performed by: SURGERY

## 2023-02-28 PROCEDURE — 80053 COMPREHEN METABOLIC PANEL: CPT | Performed by: SURGERY

## 2023-02-28 PROCEDURE — 85025 COMPLETE CBC W/AUTO DIFF WBC: CPT | Performed by: SURGERY

## 2023-02-28 PROCEDURE — 1126F AMNT PAIN NOTED NONE PRSNT: CPT | Mod: CPTII,S$GLB,, | Performed by: SURGERY

## 2023-02-28 PROCEDURE — 1160F PR REVIEW ALL MEDS BY PRESCRIBER/CLIN PHARMACIST DOCUMENTED: ICD-10-PCS | Mod: CPTII,S$GLB,, | Performed by: SURGERY

## 2023-02-28 PROCEDURE — 1101F PR PT FALLS ASSESS DOC 0-1 FALLS W/OUT INJ PAST YR: ICD-10-PCS | Mod: CPTII,S$GLB,, | Performed by: SURGERY

## 2023-02-28 NOTE — PROGRESS NOTES
Genetics Lay Navigator Note:    Nurse Navigator : DARYL Ingram RN    Met with patient at her consult with Dr. Cabrera (2/28/2023), to facilitate genetic testing. Set patient up with [email or web address] genetic counselor over the phone to complete counseling prior to testing. Patient verbalized understanding to all counseling information. [email or web address] brochure with number to call with questions or concerns provided to patient as well as my card. Encouraged patient to call me or [email or web address] at any time.     Lab appointment made and patient escorted with [email or web address] kit to lab for specimen draw and processing. Patient instructed that results will be provided as soon as they are available. No questions or concerns from patient about plan of care.     [email or web address] Genetic Pedigree scanned in media and attached to this documentation note.    [employer] Tracking # [id number]

## 2023-02-28 NOTE — Clinical Note
I am seeing her for a new area of breast cancer.  Since she had lumpectomy and radiation for her prior DCIS, we are proceeding with a mastectomy.  This does look small, early, and favorable so I expect her to do very well.  Thanks for sending her! Yokasta Cabrera MD Breast Surgical Oncology

## 2023-03-01 RX ORDER — SODIUM CHLORIDE 9 MG/ML
INJECTION, SOLUTION INTRAVENOUS CONTINUOUS
Status: CANCELLED | OUTPATIENT
Start: 2023-03-01

## 2023-03-01 RX ORDER — CLINDAMYCIN PHOSPHATE 900 MG/50ML
900 INJECTION, SOLUTION INTRAVENOUS
Status: CANCELLED | OUTPATIENT
Start: 2023-03-01

## 2023-03-03 ENCOUNTER — DOCUMENTATION ONLY (OUTPATIENT)
Dept: SURGERY | Facility: CLINIC | Age: 78
End: 2023-03-03
Payer: MEDICARE

## 2023-03-03 DIAGNOSIS — C50.812 MALIGNANT NEOPLASM OF OVERLAPPING SITES OF LEFT BREAST IN FEMALE, ESTROGEN RECEPTOR POSITIVE: Primary | ICD-10-CM

## 2023-03-03 DIAGNOSIS — Z17.0 MALIGNANT NEOPLASM OF OVERLAPPING SITES OF LEFT BREAST IN FEMALE, ESTROGEN RECEPTOR POSITIVE: Primary | ICD-10-CM

## 2023-03-03 NOTE — NURSING
Nurse Navigator Note:     Met with patient during her consult with Dr. Cabrera.  Patient and I reviewed the information she discussed with Dr. Cabrera, including treatment options, diagnosis, and future plans for workup. Patient and I went through the new patient binder, explained some of the information and why it is provided.     Also offered patient consults with our other specialty clinics: Dr. Patel for Integrative Therapies, Survivorship and/or Women's Gynecologic needs, our breast physical therapy department for pre-op and post-operative assessments, Oncologic Psychology for psychological support, and Oncologic Nutrition for nutritional counseling. Explained to patient that all of these support services are completely optional. Discussed that physical therapy may call patient to offer pre-op appt, and what that appt would entail.     Patient was given a copy of her appointments, Dr. Cabrera's card, and my card. Encouraged her to call me if she has any questions or concerns or would like to schedule any additional appointments. Verbalized understanding of all information.   Genetics done at visit. PT and integrative referrals placed. E mail added to support group.  Oncology Navigation   Intake  Date of Diagnosis: 02/16/23  Cancer Type: Breast  Internal / External Referral: Internal  Date of Referral: 02/07/23  Initial Nurse Navigator Contact: 02/20/23  Referral to Initial Contact Timeline (days): 13  Date Worked: 02/28/23  First Appointment Available: 02/28/23  Appointment Date: 02/28/23  First Available Date vs. Scheduled Date (days): 0     Treatment  Current Status: Staging work-up    Surgery: Planned  Surgical Oncologist: Deborah  Type of Surgery: Left mastectomy with DLNB and possible ALND  Consult Date: 02/28/23          Procedures: Genetic test  Biopsy Schedule Date: 02/16/23  Genetic Testing Date Sent: 02/28/23  Mammo Schedule Date: 02/16/23  Ultrasound Schedule Date: 02/16/23    Physical Therapy Referral  Date: 03/03/23          Support Systems: Family members     [employer]      Follow Up  Follow up in about 20 days (around 3/23/2023) for f/u post op.

## 2023-03-06 ENCOUNTER — HOSPITAL ENCOUNTER (OUTPATIENT)
Dept: PREADMISSION TESTING | Facility: OTHER | Age: 78
Discharge: HOME | End: 2023-03-06
Attending: SURGERY
Payer: MEDICARE

## 2023-03-06 ENCOUNTER — ANESTHESIA EVENT (OUTPATIENT)
Dept: SURGERY | Facility: OTHER | Age: 78
End: 2023-03-06
Payer: MEDICARE

## 2023-03-06 VITALS
RESPIRATION RATE: 16 BRPM | DIASTOLIC BLOOD PRESSURE: 72 MMHG | SYSTOLIC BLOOD PRESSURE: 143 MMHG | OXYGEN SATURATION: 95 % | BODY MASS INDEX: 29.08 KG/M2 | HEART RATE: 74 BPM | TEMPERATURE: 98 F | HEIGHT: 62 IN | WEIGHT: 158 LBS

## 2023-03-06 DIAGNOSIS — Z01.818 PRE-OP TESTING: ICD-10-CM

## 2023-03-06 PROCEDURE — 93010 EKG 12-LEAD: ICD-10-PCS | Mod: ,,, | Performed by: INTERNAL MEDICINE

## 2023-03-06 PROCEDURE — 93005 ELECTROCARDIOGRAM TRACING: CPT

## 2023-03-06 PROCEDURE — 93010 ELECTROCARDIOGRAM REPORT: CPT | Mod: ,,, | Performed by: INTERNAL MEDICINE

## 2023-03-06 RX ORDER — SODIUM CHLORIDE, SODIUM LACTATE, POTASSIUM CHLORIDE, CALCIUM CHLORIDE 600; 310; 30; 20 MG/100ML; MG/100ML; MG/100ML; MG/100ML
INJECTION, SOLUTION INTRAVENOUS CONTINUOUS
Status: CANCELLED | OUTPATIENT
Start: 2023-03-06

## 2023-03-06 RX ORDER — LIDOCAINE HYDROCHLORIDE 10 MG/ML
0.5 INJECTION, SOLUTION EPIDURAL; INFILTRATION; INTRACAUDAL; PERINEURAL ONCE
Status: CANCELLED | OUTPATIENT
Start: 2023-03-06 | End: 2023-03-06

## 2023-03-06 RX ORDER — FAMOTIDINE 20 MG/1
20 TABLET, FILM COATED ORAL
Status: CANCELLED | OUTPATIENT
Start: 2023-03-06 | End: 2023-03-06

## 2023-03-06 RX ORDER — ACETAMINOPHEN 325 MG/1
975 TABLET ORAL
Status: CANCELLED | OUTPATIENT
Start: 2023-03-06 | End: 2023-03-06

## 2023-03-06 NOTE — DISCHARGE INSTRUCTIONS
Information to Prepare you for your Surgery    PRE-ADMIT TESTING -  [phone]    [facility]          Your surgery has been scheduled at [facility]. We are pleased to have the opportunity to serve you. For Further Information please call [phone].    On the day of surgery please report to the Information Desk on the 1st floor.    CONTACT YOUR PHYSICIAN'S OFFICE THE DAY PRIOR TO YOUR SURGERY TO OBTAIN YOUR ARRIVAL TIME.     The evening before surgery do not eat anything after 9 p.m. ( this includes hard candy, chewing gum and mints).  You may only have GATORADE, POWERADE AND WATER  from 9 p.m. until you leave your home.   DO NOT DRINK ANY LIQUIDS ON THE WAY TO THE HOSPITAL.      Why does your anesthesiologist allow you to drink Gatorade/Powerade before surgery?  Gatorade/Powerade helps to increase your comfort before surgery and to decrease your nausea after surgery. The carbohydrates in Gatorade/Powerade help reduce your body's stress response to surgery.  If you are a diabetic-drink only water prior to surgery.      Current Visitor policy(12/27/2021) - Patients may have 2 visitors pre and post procedure. Only 2 visitors will be allowed in the Surgical building with the patient. No one under the age of 12 will be allowed into the facility.    SPECIAL MEDICATION INSTRUCTIONS: TAKE medications checked off by the Anesthesiologist on your Medication List.    Angiogram Patients: Take medications as instructed by your physician, including aspirin.     Surgery Patients:    If you take ASPIRIN - Your PHYSICIAN/SURGEON will need to inform you IF/OR when you need to stop taking aspirin prior to your surgery.     The week prior to surgery do not ot take any medications containing IBUPROFEN or NSAIDS ( Advil, Motrin, Goodys, BC, Aleve, Naproxen etc) If you are not sure if you should take a medicine please call your surgeon's office.  Ok to take  Tylenol    Do Not Wear any make-up (especially eye make-up) to surgery. Please remove any false eyelashes or eyelash extensions. If you arrive the day of surgery with makeup/eyelashes on you will be required to remove prior to surgery. (There is a risk of corneal abrasions if eye makeup/eyelash extensions are not removed)      Leave all valuables at home.   Do Not wear any jewelry or watches, including any metal in body piercings. Jewelry must be removed prior to coming to the hospital.  There is a possibility that rings that are unable to be removed may be cut off if they are on the surgical extremity.    Please remove all hair extensions, wigs, clips and any other metal accessories/ ornaments from your hair.  These items may pose a flammable/fire risk in Surgery and must be removed.    Do not shave your surgical area at least 5 days prior to your surgery. The surgical prep will be performed at the hospital according to Infection Control regulations.    Contact Lens must be removed before surgery. Either do not wear the contact lens or bring a case and solution for storage.  Please bring a container for eyeglasses or dentures as required.  Bring any paperwork your physician has provided, such as consent forms,  history and physicals, doctor's orders, etc.   Bring comfortable clothes that are loose fitting to wear upon discharge. Take into consideration the type of surgery being performed.  Maintain your diet as advised per your physician the day prior to surgery.      Adequate rest the night before surgery is advised.   Park in the Parking lot behind the hospital or in the [place] [facility] across the street from the parking lot. Parking is complimentary.  If you will be discharged the same day as your procedure, please arrange for a responsible adult to drive you home or to accompany you if traveling by taxi.   YOU WILL NOT BE PERMITTED TO DRIVE OR TO LEAVE THE HOSPITAL ALONE AFTER SURGERY.   If you are  being discharged the same day, it is strongly recommended that you arrange for someone to remain with you for the first 24 hrs following your surgery.    The Surgeon will speak to your family/visitor after your surgery regarding the outcome of your surgery and post op care.  The Surgeon may speak to you after your surgery, but there is a possibility you may not remember the details.  Please check with your family members regarding the conversation with the Surgeon.    We strongly recommend whoever is bringing you home be present for discharge instructions.  This will ensure a thorough understanding for your post op home care.    ALL CHILDREN MUST ALWAYS BE ACCOMPANIED BY AN ADULT.    Visitors-Refer to current Visitor policy handouts.    Thank you for your cooperation.  The Staff of [facility].            Bathing Instructions with Hibiclens    Shower the evening before and morning of your procedure with Chlorhexidine (Hibiclens)  do not use Chlorhexidine on your face or genitals. Do not get in your eyes.  Wash your face with water and your regular face wash/soap  Use your regular shampoo  Apply Chlorhexidine (Hibiclens) directly on your skin or on a wet washcloth and wash gently. When showering: Move away from the shower stream when applying Chlorhexidine (Hibiclens) to avoid rinsing off too soon.  Rinse thoroughly with warm water  Do not dilute Chlorhexidine (Hibiclens)   Dry off as usual, do not use any deodorant, powder, body lotions, perfume, after shave or cologne.

## 2023-03-06 NOTE — ANESTHESIA PREPROCEDURE EVALUATION
03/06/2023  Winnie Ngo is a 78 y.o., female.      Pre-op Assessment    I have reviewed the Patient Summary Reports.     I have reviewed the Nursing Notes.    I have reviewed the Medications.     Review of Systems  Anesthesia Hx:  No problems with previous Anesthesia    Social:  Non-Smoker    Hematology/Oncology:        Current/Recent Cancer. Breast left   Cardiovascular:   Exercise tolerance: good    Pulmonary:   COPD, mild Asthma mild    Hepatic/GI:   GERD, well controlled    Musculoskeletal:   Arthritis     Neurological:  Neurology Normal    Endocrine:   Diabetes, well controlled    Psych:   Psychiatric History anxiety depression          Physical Exam  General: Well nourished, Cooperative and Alert    Airway:  Mallampati: II   Mouth Opening: Normal  TM Distance: Normal  Tongue: Normal  Neck ROM: Normal ROM    Dental:  Intact        Anesthesia Plan  Type of Anesthesia, risks & benefits discussed:    Anesthesia Type: Gen ETT, Gen Supraglottic Airway  Intra-op Monitoring Plan: Standard ASA Monitors  Post Op Pain Control Plan: multimodal analgesia  Induction:  IV  Informed Consent: Informed consent signed with the Patient and all parties understand the risks and agree with anesthesia plan.  All questions answered.   ASA Score: 3  Anesthesia Plan Notes: Surgery on the left.  Has recent labs.  Sees pcp regularly, issues addressed    Ready For Surgery From Anesthesia Perspective.     .

## 2023-03-08 PROBLEM — Z17.0 MALIGNANT NEOPLASM OF OVERLAPPING SITES OF LEFT BREAST IN FEMALE, ESTROGEN RECEPTOR POSITIVE: Status: ACTIVE | Noted: 2023-03-08

## 2023-03-08 PROBLEM — C50.812 MALIGNANT NEOPLASM OF OVERLAPPING SITES OF LEFT BREAST IN FEMALE, ESTROGEN RECEPTOR POSITIVE: Status: ACTIVE | Noted: 2023-03-08

## 2023-03-10 ENCOUNTER — TELEPHONE (OUTPATIENT)
Dept: SURGERY | Facility: CLINIC | Age: 78
End: 2023-03-10
Payer: MEDICARE

## 2023-03-10 NOTE — TELEPHONE ENCOUNTER
Genetic Lay Navigation Note:    Called patient with the results of her genetic testing. Informed patient that results were negative for any notable mutation. Instructed patient that we would ensure she received a copy of her results via [employer] or mail, and to call us with any questions or concerns regarding the full report. Patient verbalized understanding to all information, no questions at this time.     Patient's ordering physician made aware of results and that patient was informed of them.

## 2023-03-13 LAB — INTEGRATED BRAC ANALYSIS: NORMAL

## 2023-03-21 ENCOUNTER — TELEPHONE (OUTPATIENT)
Dept: SURGERY | Facility: CLINIC | Age: 78
End: 2023-03-21
Payer: MEDICARE

## 2023-03-21 NOTE — TELEPHONE ENCOUNTER
Spoke to patient to give surgery arrival time of 0600 tomorrow at [facility].  Pt verbalized understanding of arrival time and location.  Patient had no other concerns at this time.

## 2023-03-22 ENCOUNTER — HOSPITAL ENCOUNTER (OUTPATIENT)
Dept: RADIOLOGY | Facility: OTHER | Age: 78
Discharge: HOME | End: 2023-03-22
Attending: SURGERY | Admitting: SURGERY
Payer: MEDICARE

## 2023-03-22 ENCOUNTER — ANESTHESIA (OUTPATIENT)
Dept: SURGERY | Facility: OTHER | Age: 78
End: 2023-03-22
Payer: MEDICARE

## 2023-03-22 ENCOUNTER — HOSPITAL ENCOUNTER (OUTPATIENT)
Dept: RADIOLOGY | Facility: OTHER | Age: 78
Discharge: HOME | End: 2023-03-22
Attending: SURGERY
Payer: MEDICARE

## 2023-03-22 ENCOUNTER — HOSPITAL ENCOUNTER (OUTPATIENT)
Facility: OTHER | Age: 78
Discharge: HOME | End: 2023-03-22
Attending: SURGERY | Admitting: SURGERY
Payer: MEDICARE

## 2023-03-22 VITALS
SYSTOLIC BLOOD PRESSURE: 123 MMHG | OXYGEN SATURATION: 96 % | RESPIRATION RATE: 16 BRPM | TEMPERATURE: 98 F | HEART RATE: 80 BPM | DIASTOLIC BLOOD PRESSURE: 59 MMHG

## 2023-03-22 DIAGNOSIS — C50.812 MALIGNANT NEOPLASM OF OVERLAPPING SITES OF LEFT BREAST IN FEMALE, ESTROGEN RECEPTOR POSITIVE: ICD-10-CM

## 2023-03-22 DIAGNOSIS — Z17.0 MALIGNANT NEOPLASM OF OVERLAPPING SITES OF LEFT BREAST IN FEMALE, ESTROGEN RECEPTOR POSITIVE: ICD-10-CM

## 2023-03-22 LAB — POCT GLUCOSE: 225 MG/DL (ref 70–110)

## 2023-03-22 PROCEDURE — 38792 PR IDENTIFY SENTINEL 2DE: ICD-10-PCS | Mod: 51,LT,, | Performed by: SURGERY

## 2023-03-22 PROCEDURE — 25000003 PHARM REV CODE 250: Performed by: ANESTHESIOLOGY

## 2023-03-22 PROCEDURE — 88307 TISSUE EXAM BY PATHOLOGIST: CPT | Mod: 59 | Performed by: PATHOLOGY

## 2023-03-22 PROCEDURE — 71000016 HC POSTOP RECOV ADDL HR: Performed by: SURGERY

## 2023-03-22 PROCEDURE — 38792 RA TRACER ID OF SENTINL NODE: CPT | Mod: 51,LT,, | Performed by: SURGERY

## 2023-03-22 PROCEDURE — 38525 PR BIOPSY/REM LYMPH NODES, AXILLARY: ICD-10-PCS | Mod: 51,LT,, | Performed by: SURGERY

## 2023-03-22 PROCEDURE — 71000015 HC POSTOP RECOV 1ST HR: Performed by: SURGERY

## 2023-03-22 PROCEDURE — 88342 CHG IMMUNOCYTOCHEMISTRY: ICD-10-PCS | Mod: 26,,, | Performed by: PATHOLOGY

## 2023-03-22 PROCEDURE — 25000003 PHARM REV CODE 250: Performed by: SURGERY

## 2023-03-22 PROCEDURE — 19303 MAST SIMPLE COMPLETE: CPT | Mod: LT,,, | Performed by: SURGERY

## 2023-03-22 PROCEDURE — 27201423 OPTIME MED/SURG SUP & DEVICES STERILE SUPPLY: Performed by: SURGERY

## 2023-03-22 PROCEDURE — 36000707: Performed by: SURGERY

## 2023-03-22 PROCEDURE — 88342 IMHCHEM/IMCYTCHM 1ST ANTB: CPT | Mod: 26,,, | Performed by: PATHOLOGY

## 2023-03-22 PROCEDURE — 71000039 HC RECOVERY, EACH ADD'L HOUR: Performed by: SURGERY

## 2023-03-22 PROCEDURE — 63600175 PHARM REV CODE 636 W HCPCS: Performed by: NURSE ANESTHETIST, CERTIFIED REGISTERED

## 2023-03-22 PROCEDURE — 37000009 HC ANESTHESIA EA ADD 15 MINS: Performed by: SURGERY

## 2023-03-22 PROCEDURE — 71000033 HC RECOVERY, INTIAL HOUR: Performed by: SURGERY

## 2023-03-22 PROCEDURE — 19303 PR MASTECTOMY, SIMPLE, COMPLETE: ICD-10-PCS | Mod: LT,,, | Performed by: SURGERY

## 2023-03-22 PROCEDURE — 63600175 PHARM REV CODE 636 W HCPCS: Performed by: ANESTHESIOLOGY

## 2023-03-22 PROCEDURE — 37000008 HC ANESTHESIA 1ST 15 MINUTES: Performed by: SURGERY

## 2023-03-22 PROCEDURE — 88341 PR IHC OR ICC EACH ADD'L SINGLE ANTIBODY  STAINPR: ICD-10-PCS | Mod: 26,,, | Performed by: PATHOLOGY

## 2023-03-22 PROCEDURE — 88341 IMHCHEM/IMCYTCHM EA ADD ANTB: CPT | Mod: 59 | Performed by: PATHOLOGY

## 2023-03-22 PROCEDURE — 63600175 PHARM REV CODE 636 W HCPCS: Performed by: SURGERY

## 2023-03-22 PROCEDURE — 88341 IMHCHEM/IMCYTCHM EA ADD ANTB: CPT | Mod: 26,,, | Performed by: PATHOLOGY

## 2023-03-22 PROCEDURE — C9290 INJ, BUPIVACAINE LIPOSOME: HCPCS | Performed by: SURGERY

## 2023-03-22 PROCEDURE — 36000706: Performed by: SURGERY

## 2023-03-22 PROCEDURE — C1729 CATH, DRAINAGE: HCPCS | Performed by: SURGERY

## 2023-03-22 PROCEDURE — 25000003 PHARM REV CODE 250: Performed by: NURSE ANESTHETIST, CERTIFIED REGISTERED

## 2023-03-22 PROCEDURE — 88307 TISSUE EXAM BY PATHOLOGIST: CPT | Mod: 26,,, | Performed by: PATHOLOGY

## 2023-03-22 PROCEDURE — 76098 X-RAY EXAM SURGICAL SPECIMEN: CPT | Mod: TC

## 2023-03-22 PROCEDURE — 38525 BIOPSY/REMOVAL LYMPH NODES: CPT | Mod: 51,LT,, | Performed by: SURGERY

## 2023-03-22 PROCEDURE — 88307 PR  SURG PATH,LEVEL V: ICD-10-PCS | Mod: 26,,, | Performed by: PATHOLOGY

## 2023-03-22 PROCEDURE — 88342 IMHCHEM/IMCYTCHM 1ST ANTB: CPT | Performed by: PATHOLOGY

## 2023-03-22 RX ORDER — LIDOCAINE HYDROCHLORIDE 20 MG/ML
INJECTION INTRAVENOUS
Status: DISCONTINUED | OUTPATIENT
Start: 2023-03-22 | End: 2023-03-22

## 2023-03-22 RX ORDER — HYDROMORPHONE HYDROCHLORIDE 2 MG/ML
0.4 INJECTION, SOLUTION INTRAMUSCULAR; INTRAVENOUS; SUBCUTANEOUS EVERY 5 MIN PRN
Status: DISCONTINUED | OUTPATIENT
Start: 2023-03-22 | End: 2023-03-22 | Stop reason: HOSPADM

## 2023-03-22 RX ORDER — PHENYLEPHRINE HYDROCHLORIDE 10 MG/ML
INJECTION INTRAVENOUS
Status: DISCONTINUED | OUTPATIENT
Start: 2023-03-22 | End: 2023-03-22

## 2023-03-22 RX ORDER — DEXAMETHASONE SODIUM PHOSPHATE 4 MG/ML
INJECTION, SOLUTION INTRA-ARTICULAR; INTRALESIONAL; INTRAMUSCULAR; INTRAVENOUS; SOFT TISSUE
Status: DISCONTINUED | OUTPATIENT
Start: 2023-03-22 | End: 2023-03-22

## 2023-03-22 RX ORDER — ACETAMINOPHEN 325 MG/1
975 TABLET ORAL
Status: COMPLETED | OUTPATIENT
Start: 2023-03-22 | End: 2023-03-22

## 2023-03-22 RX ORDER — MUPIROCIN 20 MG/G
OINTMENT TOPICAL 2 TIMES DAILY
Status: DISCONTINUED | OUTPATIENT
Start: 2023-03-22 | End: 2023-03-22 | Stop reason: HOSPADM

## 2023-03-22 RX ORDER — FAMOTIDINE 20 MG/1
20 TABLET, FILM COATED ORAL
Status: COMPLETED | OUTPATIENT
Start: 2023-03-22 | End: 2023-03-22

## 2023-03-22 RX ORDER — SODIUM CHLORIDE 0.9 % (FLUSH) 0.9 %
3 SYRINGE (ML) INJECTION
Status: DISCONTINUED | OUTPATIENT
Start: 2023-03-22 | End: 2023-03-22 | Stop reason: HOSPADM

## 2023-03-22 RX ORDER — HYDROCODONE BITARTRATE AND ACETAMINOPHEN 5; 325 MG/1; MG/1
1 TABLET ORAL EVERY 4 HOURS PRN
Status: DISCONTINUED | OUTPATIENT
Start: 2023-03-22 | End: 2023-03-22 | Stop reason: HOSPADM

## 2023-03-22 RX ORDER — SODIUM CHLORIDE, SODIUM LACTATE, POTASSIUM CHLORIDE, CALCIUM CHLORIDE 600; 310; 30; 20 MG/100ML; MG/100ML; MG/100ML; MG/100ML
INJECTION, SOLUTION INTRAVENOUS CONTINUOUS
Status: DISCONTINUED | OUTPATIENT
Start: 2023-03-22 | End: 2023-03-22 | Stop reason: HOSPADM

## 2023-03-22 RX ORDER — ROCURONIUM BROMIDE 10 MG/ML
INJECTION, SOLUTION INTRAVENOUS
Status: DISCONTINUED | OUTPATIENT
Start: 2023-03-22 | End: 2023-03-22

## 2023-03-22 RX ORDER — DIPHENHYDRAMINE HYDROCHLORIDE 50 MG/ML
25 INJECTION INTRAMUSCULAR; INTRAVENOUS EVERY 6 HOURS PRN
Status: DISCONTINUED | OUTPATIENT
Start: 2023-03-22 | End: 2023-03-22 | Stop reason: HOSPADM

## 2023-03-22 RX ORDER — SUCCINYLCHOLINE CHLORIDE 20 MG/ML
INJECTION INTRAMUSCULAR; INTRAVENOUS
Status: DISCONTINUED | OUTPATIENT
Start: 2023-03-22 | End: 2023-03-22

## 2023-03-22 RX ORDER — LIDOCAINE HYDROCHLORIDE 10 MG/ML
0.5 INJECTION, SOLUTION EPIDURAL; INFILTRATION; INTRACAUDAL; PERINEURAL ONCE
Status: DISCONTINUED | OUTPATIENT
Start: 2023-03-22 | End: 2023-03-22 | Stop reason: HOSPADM

## 2023-03-22 RX ORDER — PROCHLORPERAZINE EDISYLATE 5 MG/ML
5 INJECTION INTRAMUSCULAR; INTRAVENOUS EVERY 30 MIN PRN
Status: DISCONTINUED | OUTPATIENT
Start: 2023-03-22 | End: 2023-03-22 | Stop reason: HOSPADM

## 2023-03-22 RX ORDER — CLINDAMYCIN PHOSPHATE 900 MG/50ML
900 INJECTION, SOLUTION INTRAVENOUS
Status: COMPLETED | OUTPATIENT
Start: 2023-03-22 | End: 2023-03-22

## 2023-03-22 RX ORDER — MEPERIDINE HYDROCHLORIDE 25 MG/ML
12.5 INJECTION INTRAMUSCULAR; INTRAVENOUS; SUBCUTANEOUS ONCE AS NEEDED
Status: DISCONTINUED | OUTPATIENT
Start: 2023-03-22 | End: 2023-03-22 | Stop reason: HOSPADM

## 2023-03-22 RX ORDER — OXYCODONE HYDROCHLORIDE 5 MG/1
5 TABLET ORAL EVERY 4 HOURS PRN
Qty: 15 TABLET | Refills: 0 | Status: SHIPPED | OUTPATIENT
Start: 2023-03-22 | End: 2023-04-17

## 2023-03-22 RX ORDER — OXYCODONE HYDROCHLORIDE 5 MG/1
5 TABLET ORAL
Status: DISCONTINUED | OUTPATIENT
Start: 2023-03-22 | End: 2023-03-22 | Stop reason: HOSPADM

## 2023-03-22 RX ORDER — BUPIVACAINE HYDROCHLORIDE 2.5 MG/ML
INJECTION, SOLUTION EPIDURAL; INFILTRATION; INTRACAUDAL
Status: DISCONTINUED | OUTPATIENT
Start: 2023-03-22 | End: 2023-03-22 | Stop reason: HOSPADM

## 2023-03-22 RX ORDER — ONDANSETRON 2 MG/ML
4 INJECTION INTRAMUSCULAR; INTRAVENOUS EVERY 12 HOURS PRN
Status: DISCONTINUED | OUTPATIENT
Start: 2023-03-22 | End: 2023-03-22 | Stop reason: HOSPADM

## 2023-03-22 RX ORDER — FENTANYL CITRATE 50 UG/ML
INJECTION, SOLUTION INTRAMUSCULAR; INTRAVENOUS
Status: DISCONTINUED | OUTPATIENT
Start: 2023-03-22 | End: 2023-03-22

## 2023-03-22 RX ORDER — SODIUM CHLORIDE 9 MG/ML
INJECTION, SOLUTION INTRAVENOUS CONTINUOUS
Status: DISCONTINUED | OUTPATIENT
Start: 2023-03-22 | End: 2023-03-22 | Stop reason: HOSPADM

## 2023-03-22 RX ORDER — ISOSULFAN BLUE 50 MG/5ML
INJECTION, SOLUTION SUBCUTANEOUS
Status: DISCONTINUED | OUTPATIENT
Start: 2023-03-22 | End: 2023-03-22 | Stop reason: HOSPADM

## 2023-03-22 RX ORDER — PROPOFOL 10 MG/ML
VIAL (ML) INTRAVENOUS
Status: DISCONTINUED | OUTPATIENT
Start: 2023-03-22 | End: 2023-03-22

## 2023-03-22 RX ORDER — ONDANSETRON 2 MG/ML
INJECTION INTRAMUSCULAR; INTRAVENOUS
Status: DISCONTINUED | OUTPATIENT
Start: 2023-03-22 | End: 2023-03-22

## 2023-03-22 RX ORDER — PROPOFOL 10 MG/ML
VIAL (ML) INTRAVENOUS CONTINUOUS PRN
Status: DISCONTINUED | OUTPATIENT
Start: 2023-03-22 | End: 2023-03-22

## 2023-03-22 RX ADMIN — PROCHLORPERAZINE EDISYLATE 5 MG: 5 INJECTION INTRAMUSCULAR; INTRAVENOUS at 10:03

## 2023-03-22 RX ADMIN — SUCCINYLCHOLINE CHLORIDE 140 MG: 20 INJECTION, SOLUTION INTRAMUSCULAR; INTRAVENOUS at 08:03

## 2023-03-22 RX ADMIN — PROPOFOL 50 MG: 10 INJECTION, EMULSION INTRAVENOUS at 08:03

## 2023-03-22 RX ADMIN — LIDOCAINE HYDROCHLORIDE 80 MG: 20 INJECTION, SOLUTION INTRAVENOUS at 08:03

## 2023-03-22 RX ADMIN — SUGAMMADEX 200 MG: 100 INJECTION, SOLUTION INTRAVENOUS at 10:03

## 2023-03-22 RX ADMIN — CLINDAMYCIN PHOSPHATE 900 MG: 18 INJECTION, SOLUTION INTRAVENOUS at 08:03

## 2023-03-22 RX ADMIN — OXYCODONE HYDROCHLORIDE 5 MG: 5 TABLET ORAL at 10:03

## 2023-03-22 RX ADMIN — FAMOTIDINE 20 MG: 20 TABLET, FILM COATED ORAL at 06:03

## 2023-03-22 RX ADMIN — SODIUM CHLORIDE, SODIUM LACTATE, POTASSIUM CHLORIDE, AND CALCIUM CHLORIDE: .6; .31; .03; .02 INJECTION, SOLUTION INTRAVENOUS at 07:03

## 2023-03-22 RX ADMIN — ROCURONIUM BROMIDE 5 MG: 10 INJECTION, SOLUTION INTRAVENOUS at 08:03

## 2023-03-22 RX ADMIN — FENTANYL CITRATE 50 MCG: 50 INJECTION, SOLUTION INTRAMUSCULAR; INTRAVENOUS at 08:03

## 2023-03-22 RX ADMIN — DEXAMETHASONE SODIUM PHOSPHATE 4 MG: 4 INJECTION, SOLUTION INTRAMUSCULAR; INTRAVENOUS at 08:03

## 2023-03-22 RX ADMIN — PROPOFOL 50 MCG/KG/MIN: 10 INJECTION, EMULSION INTRAVENOUS at 08:03

## 2023-03-22 RX ADMIN — ROCURONIUM BROMIDE 20 MG: 10 INJECTION, SOLUTION INTRAVENOUS at 09:03

## 2023-03-22 RX ADMIN — ACETAMINOPHEN 975 MG: 325 TABLET, FILM COATED ORAL at 06:03

## 2023-03-22 RX ADMIN — ONDANSETRON HYDROCHLORIDE 4 MG: 2 INJECTION INTRAMUSCULAR; INTRAVENOUS at 08:03

## 2023-03-22 RX ADMIN — PHENYLEPHRINE HYDROCHLORIDE 100 MCG: 10 INJECTION INTRAVENOUS at 08:03

## 2023-03-22 RX ADMIN — PROPOFOL 150 MG: 10 INJECTION, EMULSION INTRAVENOUS at 08:03

## 2023-03-22 NOTE — DISCHARGE INSTRUCTIONS
Your Surgeon Gave You EXPAREL® (bupivacaine liposome injectable suspension)    To help control your pain after surgery, your surgeon injected EXPAREL into your surgical incision just before the end of the procedure.   EXPAREL is a local analgesic that contains the local anesthetic bupivacaine. Local anesthetics provide pain relief by numbing the tissue  around the surgical site.   EXPAREL is specifically designed to release pain medication over time and can control pain for up to 72 hours.   In addition to EXPAREL, your surgeon may provide other pain medications to control your pain.   Each patient is different and responds differently to painmedication. Depending on how you respond to EXPAREL, you may require less  additional pain medication during your recovery.    Possible Side Effects    Side effects can occur with any medication and it is important not to ignore anything you may be experiencing. Some patients who  received EXPAREL experienced nausea, vomiting, or constipation. Rarely, patients who receive bupivacaine (the active ingredient in  EXPAREL) have experienced numbness and tingling in their mouth or lips, lightheadedness, or anxiety. Speak with your doctor right  away if you think you may be experiencing any of these sensations, or if you have other questions regarding possible side effects.    Your Recovery    When your pain is under control, your body can better focus on healing. This is not the time to test your pain tolerance, or grin and  bear it.Work with your surgeon and nurse to make your recovery as speedy and pain-free as possible.   Follow the post-op orders your nurse gave you.   Eat a healthy diet and drink plenty of water. Surgery stresses your body; your body responds by needing more energy to heal.      Important Information About EXPAREL  Products that contain bupivacaine, like EXPAREL, may cause a temporary loss of  sensation or the ability to move in the area where bupivacaine  was injected.    Date Administered: 10/22/23  Time Administered: 09:45a    Other Forms of Bupivicaine should not be administered within 96hrs following administration of EXPAREL.

## 2023-03-22 NOTE — PLAN OF CARE
Winnie Ngo has met all discharge criteria from Phase II. Vital Signs are stable, ambulating  without difficulty. Discharge instructions given, patient verbalized understanding. Discharged from facility via wheelchair in stable condition.

## 2023-03-22 NOTE — TRANSFER OF CARE
Anesthesia Transfer of Care Note    Patient: Winnie Ngo    Procedure(s) Performed: Procedure(s) (LRB):  MASTECTOMY-left (Left)  BIOPSY, LYMPH NODE, SENTINEL-left (Left)  INJECTION, FOR SENTINEL NODE IDENTIFICATION-left (Left)  LYMPHADENECTOMY, AXILLARY-left (Left)    Patient location: PACU    Anesthesia Type: general    Transport from OR: Transported from OR on 2-3 L/min O2 by NC with adequate spontaneous ventilation    Post pain: adequate analgesia    Post assessment: no apparent anesthetic complications and tolerated procedure well    Post vital signs: stable    Level of consciousness: awake    Nausea/Vomiting: no nausea/vomiting    Complications: none    Transfer of care protocol was followed      Last vitals:   Visit Vitals  BP (!) 146/68   Pulse 81   Temp 36.7 °C (98.1 °F) (Oral)   Resp 18   SpO2 100%   Breastfeeding No

## 2023-03-22 NOTE — ANESTHESIA POSTPROCEDURE EVALUATION
Anesthesia Post Evaluation    Patient: Winnie Ngo    Procedure(s) Performed: Procedure(s) (LRB):  MASTECTOMY-left (Left)  BIOPSY, LYMPH NODE, SENTINEL-left (Left)  INJECTION, FOR SENTINEL NODE IDENTIFICATION-left (Left)  LYMPHADENECTOMY, AXILLARY-left (Left)    Final Anesthesia Type: general      Patient location during evaluation: PACU  Patient participation: Yes- Able to Participate  Level of consciousness: awake and alert  Post-procedure vital signs: reviewed and stable  Pain management: adequate  Airway patency: patent    PONV status at discharge: No PONV  Anesthetic complications: no      Cardiovascular status: blood pressure returned to baseline  Respiratory status: unassisted  Hydration status: euvolemic  Follow-up not needed.          Vitals Value Taken Time   /64 03/22/23 1202   Temp 36.7 °C (98 °F) 03/22/23 1127   Pulse 88 03/22/23 1218   Resp 18 03/22/23 1127   SpO2 95 % 03/22/23 1218   Vitals shown include unvalidated device data.      Event Time   Out of Recovery 11:34:05         Pain/Laney Score: Pain Rating Prior to Med Admin: 4 (3/22/2023 10:42 AM)  Pain Rating Post Med Admin: 0 (3/22/2023 11:39 AM)  Laney Score: 10 (3/22/2023 11:39 AM)

## 2023-03-22 NOTE — PLAN OF CARE
Post op instructions provided and family member instructed on drain care.  Return demonstration by son completed, all questions answered.

## 2023-03-22 NOTE — ANESTHESIA PROCEDURE NOTES
Intubation    Date/Time: 3/22/2023 8:09 AM  Performed by: Rita Cordero  Authorized by: Lux Sheffield MD     Intubation:     Induction:  Intravenous    Intubated:  Postinduction    Mask Ventilation:  Easy with oral airway    Attempts:  1    Attempted By:  CRNA    Method of Intubation:  Video laryngoscopy    Blade:  Rivero 3    Laryngeal View Grade: Grade IIb - only the arytenoids and epiglottis seen      Difficult Airway Encountered?: No      Complications:  None    Airway Device:  Oral endotracheal tube    Airway Device Size:  7.0    Style/Cuff Inflation:  Cuffed (inflated to minimal occlusive pressure)    Tube secured:  20    Secured at:  The lips    Placement Verified By:  Capnometry    Complicating Factors:  None    Findings Post-Intubation:  BS equal bilateral and atraumatic/condition of teeth unchanged

## 2023-03-22 NOTE — PATIENT INSTRUCTIONS
POSTOPERATIVE INSTRUCTIONS FOLLOWING   MASTECTOMY AND/OR AXILLARY LYMPH NODE DISSECTION    The following are post-operative instructions that will help you to recover from your surgery.  Please read over these instructions carefully and contact us if we can answer any of your questions or concerns.    Post-op care/Dressing/breast binder (surgi-bra)  A surgical bra may be placed around your chest after your surgery.  If you are given the bra, please wear it for the first 48 hours after surgery. After 48 hours you can remove your surgical bra and dressing to shower/cleanse the chest wall with antibacterial soap and warm water. Do not take a tub bath and do not soak the surgical site for at least 2 weeks.     The final pathology report will be available approximately 7-10 days after your surgery.  Our office will call you with your pathology report when it becomes available.    If blue dye was used to locate your sentinel lymph nodes, your urine and stool may be blue-green in color for 1 or 2 days.    Please wear the surgical bra as close to 24 hours a day as possible until your post-operative clinic appointment.  If the elastic around the bra irritates your skin, you may wear a soft t-shirt underneath the bra. You may shower AFTER the drains are removed.  Please sponge bathe until then. Please do not remove the white strips of tape (steri-strips) that cover your incision.  They will be removed at your clinic visit. You may go without wearing the bra long enough to bath, to launder and dry the bra. If you have fluffy filler placed inside the bra, the filler should be removed whenever the bra is taken off. Please reinsert the fluffy filler, or insert the new soft filler, under the bra when you put the bra back on.  If the bra is extremely uncomfortable, you may wear a supportive sports bra instead after 2 days.    Activity   You will be able to do much of your own personal care, such as bathing, dressing, preparing  simple meals, etc.  A short walk each day will help with your recovery  You may find that you need to take rest breaks between activities, but you should not need to stay in bed for prolonged periods of time during the day. A good rule during this time is to listen to your body, do what is comfortable, and stop and rest when your feel tired.  If it hurts, dont do it.  Return to taking your daily medications as prescribed  Please avoid activities that require moderate to heavy lifting (grocery shopping) or pushing/pulling (vacuuming) and repetitive motions (such as washing windows). Do not lift anything heavier than a gallon of milk.  Following a lymph node dissection, dont avoid using your arm, but dont exercise your arm until after your first post-operative visit.  At your first post-op visit, you will be given arm exercises to regain movement and flexibility.  You may be referred to physical therapy if needed.  You may restart driving when you are no longer on narcotics and you feel safe turning the wheel and stopping quickly.  You will need to be out of work approximately 2-6 weeks depending on your particular surgery and how well you are recovering.  We will evaluate how you are doing at the first post-op appointment.  This is a good time to ask when you may return to work and what activities you may do.    Medication for pain  You will be given a prescription for pain medication. You should not drive or operate machinery while taking these.  Please take prescription pain medication (narcotics) with food.  Narcotics can cause, or worsen, constipation.  You will need to increase your fluid intake, eat high fiber foods (such as fruits and bran) and make sure that you are up and walking. You may need to take an over the counter stool softener for constipation.  Short term use of an icepack may be helpful to decrease discomfort and swelling, particularly to the armpit after lymph node surgery.  A small pillow  positioned in the armpit may also decrease discomfort after lymph node surgery.  If you are given a prescription for antibiotics, take them as prescribed.    How to care for your Drain(s)  Wash hands--STRIP or milk the drainage tube as it comes out of your body toward the bulb.   Beginning where the drain comes out of your body, hold drainage tubing with one hand and with the other, stretch and release tubing an inch at time while moving downward with both hands toward the bulb.  Do this 2-3 times before emptying the bulb.  Remove the stopper from the bulbs port  (drainage port)  Pour the drainage in the measuring cup provided by the nurse  Flatten/squeeze the bulb to create a vacuum and replace the stopper before letting go of the bulb.  Record the date, time and amount of drainage in ccs (not ounces) each time bulb is emptied. If you have more than one drain, record each separately.  Discard the drainage into the toilet after measuring and then wash hands.  Empty bulbs 2-3 times/day or as needed if it fills up before 8 hours.  Remember to bring the output record with you to your doctors appointment.    Please report the following:  Temperature greater than 101 degrees  Discharge or bad odor from the wound  Excessive bleeding, such as saturated bloody dressing or extreme bruising  Redness at incision and/or drain sites  Swelling or buildup of fluid around incision  Persistent fevers, chills, nausea, vomiting, or diarrhea    Additional information  Your surgeon will see you approximately 2 weeks following your surgery.  If this follow-up appointment has not been made, please call the office.    If you have any questions or problems, please call my office or my nurse.    Dr. Carmen Lopez, NAINA Bonilla, NAINA Bonilla, RN Vianney Hernandez, RN  [phone]    Clemencia Cesar PA-C   [phone]  Carmen Max RN  [phone]    After hours and on weekends, you may call the main [facility] line at [phone] and ask to have the general surgery resident paged or have me paged      Lymphedema Risk Reduction    Lymphedema is a swelling of a part of the body, caused by an insufficient lymphatic system and an accumulation of fluid in the bodys tissues.  Lymphedema may occur when normal drainage of fluid is disrupted, such as an infection, injury, cancer, scar tissue, or removal of lymph nodes.    For those patients having a sentinel lymph node biopsy, these risks may be smaller and the recommendations are provided for your review and consideration.    The following list contains recommendations for reducing your risk of developing lymphedema.    Skin Care--avoid trauma/injury to reduce infection risk  Keep the hand and arm on the side of surgery clean and dry  Pay attention to nail care and do not cut cuticles  Avoid punctures, such as injections and blood draws from you on the side of your surgery  Wear gloves while doing activities that may cause skin injury (washing dishes, gardening, etc.)  If scratches or punctures occur, wash area with soap and water, and observe for signs of infections (redness, drainage, swelling)  If a rash, itching, redness, pain, increased skin temperatures, fever, or flu-like symptoms occur, contact your physician immediately for early treatment of a possible infection  Activity/Lifestyle  Gradually build up the duration and intensity of any activity or exercise  Take frequent rest periods during activity to allow for arm recovery  Monitor your arm and upper body during and after activity for any change in size, shape, tissue, texture, soreness, heaviness, or firmness  Avoid constriction of your arm on the side of your surgery  Avoid having blood pressure taken on the arm on the side of your surgery  Wear loose fitting jewelry and clothing  Be careful not to rest a  heavy purse, luggage, or grocery bags on that arm  When you return to wearing a bra, make sure that it is well fitted and not too tight

## 2023-03-22 NOTE — OP NOTE
Operative Note     3/22/2023    PRE-OP DIAGNOSIS: Malignant neoplasm of overlapping sites of left breast in female, estrogen receptor positive [C50.812, Z17.0]      POST-OP DIAGNOSIS: Post-Op Diagnosis Codes:     * Malignant neoplasm of overlapping sites of left breast in female, estrogen receptor positive [C50.812, Z17.0]    Procedure(s):  MASTECTOMY-left total  BIOPSY, LYMPH NODE, SENTINEL-left  INJECTION, FOR SENTINEL NODE IDENTIFICATION-left      SURGEON: Surgeon(s) and Role:     * Yokasta Cabrera MD - Primary    ANESTHESIA: General     OPERATIVE FINDINGS: Healthy appearing skin flaps at the completion of the mastectomy.  [place] node identified and excised.    INDICATION FOR PROCEDURE: This patient presents with a history of invasive carcinoma of the left breast    PROCEDURE IN DETAIL:  Winnie Ngo is a 78 y.o. female brought to the operating room for definitive surgery of invasive carcinoma of the left breast.  The patient has elected to undergo left simple mastectomy with sentinel lymph node biopsy for emma assessment. The patient was informed of the possible risks and complications of the procedure, including but not limited to anesthetic risks, bleeding, infection, and need for additional surgery.  The patient concurred with the proposed plan, and has given informed consent.  The site of surgery was properly noted/marked in the preoperative holding area.    The patient was then brought to the operating room and placed in the supine position with both upper extremities extended.  local and general anesthesia was administered. Perioperative antibiotics were administered consisting of Ancef and a time out was performed confirming the patient, site, and procedure.   The patient's left breast was injected with technetium to facilitate sentinel lymph node identification.The left chest and axilla was then prepped and draped in the usual sterile fashion.    We then turned our attention to the left breast  where an elliptical incision was fashioned to incorporate the nipple areolar complex.  The incision was made with a 10-blade and extended through the subcutaneous tissues with Bovie electrocautery.  Skin flaps were raised to the clavicle superiorly.  We then  turned our attention to the left axilla.  The gamma probe was used to identify an area of increased radioactivity within the lower axilla. The clavipectoral sheath was sharply incised to reveal the level I axillary lymph nodes. The probe was used to identify a single node with increased radioactivity.  This node was brought into the operative field and carefully dissected free of the surrounding lymphovascular structures.  The highest ex vivo count of the node was 111.  The node was then sent to pathology for frozen section evaluation, labeled as sentinel node #1.  A total of 1 axillary sentinel nodes and 0 axillary non-sentinel nodes were identified, excised and submitted to pathology.  Bed counts were obtained to confirm that the 10% rule had not been violated.   The wound was irrigated with normal saline, and all bleeding points were secured with Bovie electrocautery.     We then proceeded to raise the remainder of the flaps to the lateral border of the sternum medially, to the inframammary fold inferiorly, and to the anterior border of the latissimus dorsi muscle laterally. The breast tissue was sharply excised off the chest wall taking care to incorporate the pectoralis fascia while leaving the serratus fascia behind.  The resulting mastectomy specimen was marked using a short stitch superiorly and long stitch laterally.  The breast was sent to pathology for permanent evaluation.      Frozen section emma evaluation revealed no evidence of metastatic disease.  Therefore, the operative field was irrigated with normal saline and all bleeding points were secured with Bovie electrocautery.  A 19 Fr albert drain was placed under the mastectomy flap. The incision  was closed using an interrupted 3-0 vicryl deep dermal stitch followed by a running 4-0 vicryl subcuticular.      Dermabond was applied. A post surgical bra was placed on the patient. At the end of the operation, all sponge, instrument, and needle counts x 2 were correct.    ESTIMATED BLOOD LOSS: Minimal    COMPLICATIONS: none    DISPOSITION: PACU - hemodynamically stable.    ATTESTATION:   I performed the procedure.    [place] Node Biopsy for Breast Cancer - Left  Operation performed with curative intent Yes   Tracer(s) used to identify sentinel nodes in the upfront surgery (non-neoadjuvant) setting Radioactive tracer   Tracer(s) used to identify sentinel nodes in the neoadjuvant setting N/A   All nodes (colored or non-colored) present at the end of a dye-filled lymphatic channel were removed N/A   All significantly radioactive nodes were removed Yes   All palpably suspicious nodes were removed N/A   Biopsy-proven positive nodes marked with clips prior to chemotherapy were identified and removed N/A

## 2023-03-22 NOTE — BRIEF OP NOTE
[facility] - Surgery ([place])  Brief Operative Note    Surgery Date: 3/22/2023     Surgeon(s) and Role:     * Yokasta Pena MD - Primary    Assisting Surgeon: Fly Mcleod MD - Resident    Pre-op Diagnosis:  Malignant neoplasm of overlapping sites of left breast in female, estrogen receptor positive [C50.812, Z17.0]    Post-op Diagnosis:  Post-Op Diagnosis Codes:     * Malignant neoplasm of overlapping sites of left breast in female, estrogen receptor positive [C50.812, Z17.0]    Procedure(s) (LRB):  MASTECTOMY-left (Left)  BIOPSY, LYMPH NODE, SENTINEL-left (Left)  INJECTION, FOR SENTINEL NODE IDENTIFICATION-left (Left)      Anesthesia: General    Operative Findings: L sided mastectomy with L SNLB performed. OSKAR drain left in place. Adequate hemostasis achieved.     Estimated Blood Loss: 25cc         Specimens:   Specimen (24h ago, onward)       Start     Ordered    03/22/23 0904  Specimen to Pathology, Surgery General Surgery  Once        Comments: Pre-op Diagnosis: Malignant neoplasm of overlapping sites of left breast in female, estrogen receptor positive [C50.812, Z17.0]Procedure(s):MASTECTOMY-leftBIOPSY, LYMPH NODE, SENTINEL-leftINJECTION, FOR SENTINEL NODE IDENTIFICATION-leftLYMPHADENECTOMY, AXILLARY-left Number of specimens: 2Name of specimens: 1).  LEFT AXILLARY SENTINEL LYMPH NODE HOT 1112).  LEFT BREAST SHORT STITCH SUPERIOR LONG STITCH LATERAL     References:    Click here for ordering Quick Tip   Question Answer Comment   Procedure Type: General Surgery    Which provider would you like to cc? YOKASTA PENA    Release to patient Immediate        03/22/23 0950                      Discharge Note    OUTCOME: Patient tolerated treatment/procedure well without complication and is now ready for discharge.    DISPOSITION: Home or Self Care    FINAL DIAGNOSIS:  Post-Op Diagnosis Codes:     * Malignant neoplasm of overlapping sites of left breast in female, estrogen receptor positive [C50.812, Z17.0]    FOLLOWUP:  In clinic    DISCHARGE INSTRUCTIONS:    POSTOPERATIVE INSTRUCTIONS FOLLOWING   MASTECTOMY AND/OR AXILLARY LYMPH NODE DISSECTION    The following are post-operative instructions that will help you to recover from your surgery.  Please read over these instructions carefully and contact us if we can answer any of your questions or concerns.    Post-op care/Dressing/breast binder (surgi-bra)  A surgical bra may be placed around your chest after your surgery.  If you are given the bra, please wear it for the first 48 hours after surgery. After 48 hours you can remove your surgical bra and dressing to shower/cleanse the chest wall with antibacterial soap and warm water. Do not take a tub bath and do not soak the surgical site for at least 2 weeks.     The final pathology report will be available approximately 7-10 days after your surgery.  Our office will call you with your pathology report when it becomes available.    If blue dye was used to locate your sentinel lymph nodes, your urine and stool may be blue-green in color for 1 or 2 days.    Please wear the surgical bra as close to 24 hours a day as possible until your post-operative clinic appointment.  If the elastic around the bra irritates your skin, you may wear a soft t-shirt underneath the bra. You may shower AFTER the drains are removed.  Please sponge bathe until then. Please do not remove the white strips of tape (steri-strips) that cover your incision.  They will be removed at your clinic visit. You may go without wearing the bra long enough to bath, to launder and dry the bra. If you have fluffy filler placed inside the bra, the filler should be removed whenever the bra is taken off. Please reinsert the fluffy filler, or insert the new soft filler, under the bra when you put the bra back on.  If the bra is extremely uncomfortable, you may wear a supportive sports bra instead after 2 days.    Activity   You will be able to do much of your own personal care,  such as bathing, dressing, preparing simple meals, etc.  A short walk each day will help with your recovery  You may find that you need to take rest breaks between activities, but you should not need to stay in bed for prolonged periods of time during the day. A good rule during this time is to listen to your body, do what is comfortable, and stop and rest when your feel tired.  If it hurts, dont do it.  Return to taking your daily medications as prescribed  Please avoid activities that require moderate to heavy lifting (grocery shopping) or pushing/pulling (vacuuming) and repetitive motions (such as washing windows). Do not lift anything heavier than a gallon of milk.  Following a lymph node dissection, dont avoid using your arm, but dont exercise your arm until after your first post-operative visit.  At your first post-op visit, you will be given arm exercises to regain movement and flexibility.  You may be referred to physical therapy if needed.  You may restart driving when you are no longer on narcotics and you feel safe turning the wheel and stopping quickly.  You will need to be out of work approximately 2-6 weeks depending on your particular surgery and how well you are recovering.  We will evaluate how you are doing at the first post-op appointment.  This is a good time to ask when you may return to work and what activities you may do.    Medication for pain  You will be given a prescription for pain medication. You should not drive or operate machinery while taking these.  Please take prescription pain medication (narcotics) with food.  Narcotics can cause, or worsen, constipation.  You will need to increase your fluid intake, eat high fiber foods (such as fruits and bran) and make sure that you are up and walking. You may need to take an over the counter stool softener for constipation.  Short term use of an icepack may be helpful to decrease discomfort and swelling, particularly to the armpit after  lymph node surgery.  A small pillow positioned in the armpit may also decrease discomfort after lymph node surgery.  If you are given a prescription for antibiotics, take them as prescribed.    How to care for your Drain(s)  Wash hands--STRIP or milk the drainage tube as it comes out of your body toward the bulb.   Beginning where the drain comes out of your body, hold drainage tubing with one hand and with the other, stretch and release tubing an inch at time while moving downward with both hands toward the bulb.  Do this 2-3 times before emptying the bulb.  Remove the stopper from the bulbs port  (drainage port)  Pour the drainage in the measuring cup provided by the nurse  Flatten/squeeze the bulb to create a vacuum and replace the stopper before letting go of the bulb.  Record the date, time and amount of drainage in ccs (not ounces) each time bulb is emptied. If you have more than one drain, record each separately.  Discard the drainage into the toilet after measuring and then wash hands.  Empty bulbs 2-3 times/day or as needed if it fills up before 8 hours.  Remember to bring the output record with you to your doctors appointment.    Please report the following:  Temperature greater than 101 degrees  Discharge or bad odor from the wound  Excessive bleeding, such as saturated bloody dressing or extreme bruising  Redness at incision and/or drain sites  Swelling or buildup of fluid around incision  Persistent fevers, chills, nausea, vomiting, or diarrhea    Additional information  Your surgeon will see you approximately 2 weeks following your surgery.  If this follow-up appointment has not been made, please call the office.    If you have any questions or problems, please call my office or my nurse.    Dr. Carmen Morfin, NAINA Coleman RN   [phone]  [phone]  [phone]   [phone]    After hours and on weekends, you may call the main [facility] line at  [phone] and ask to have the general surgery resident paged or have me paged      Lymphedema Risk Reduction    Lymphedema is a swelling of a part of the body, caused by an insufficient lymphatic system and an accumulation of fluid in the bodys tissues.  Lymphedema may occur when normal drainage of fluid is disrupted, such as an infection, injury, cancer, scar tissue, or removal of lymph nodes.    For those patients having a sentinel lymph node biopsy, these risks may be smaller and the recommendations are provided for your review and consideration.    The following list contains recommendations for reducing your risk of developing lymphedema.    Skin Care--avoid trauma/injury to reduce infection risk  Keep the hand and arm on the side of surgery clean and dry  Pay attention to nail care and do not cut cuticles  Avoid punctures, such as injections and blood draws from you on the side of your surgery  Wear gloves while doing activities that may cause skin injury (washing dishes, gardening, etc.)  If scratches or punctures occur, wash area with soap and water, and observe for signs of infections (redness, drainage, swelling)  If a rash, itching, redness, pain, increased skin temperatures, fever, or flu-like symptoms occur, contact your physician immediately for early treatment of a possible infection  Activity/Lifestyle  Gradually build up the duration and intensity of any activity or exercise  Take frequent rest periods during activity to allow for arm recovery  Monitor your arm and upper body during and after activity for any change in size, shape, tissue, texture, soreness, heaviness, or firmness  Avoid constriction of your arm on the side of your surgery  Avoid having blood pressure taken on the arm on the side of your surgery  Wear loose fitting jewelry and clothing  Be careful not to rest a heavy purse, luggage, or grocery bags on that arm  When you return to wearing a bra, make sure that it is well fitted  and not too tight

## 2023-03-25 ENCOUNTER — NURSE TRIAGE (OUTPATIENT)
Dept: ADMINISTRATIVE | Facility: CLINIC | Age: 78
End: 2023-03-25
Payer: MEDICARE

## 2023-03-25 NOTE — TELEPHONE ENCOUNTER
Pt calling with c/o diarrhea and she said that she had a mastectomy on wed this week and it started on thurs. Yesterday she had it worse not as bad today but wondering if result of medication given. Pt care advice to see Md within 24 hours if continues. Pt will do imodium only took 1/2 as she didn't want to get stopped up but told would try a whole and diet and yogurt etc will reach back out if any worsening condition and will route message to MD             Reason for Disposition   [1] SEVERE diarrhea (e.g., 7 or more times / day more than normal) AND [2] present > 24 hours (1 day)    Additional Information   Negative: Shock suspected (e.g., cold/pale/clammy skin, too weak to stand, low BP, rapid pulse)   Negative: Difficult to awaken or acting confused (e.g., disoriented, slurred speech)   Negative: Sounds like a life-threatening emergency to the triager   Negative: [1] SEVERE abdominal pain (e.g., excruciating) AND [2] present > 1 hour   Negative: [1] SEVERE abdominal pain AND [2] age > 60 years   Negative: [1] Blood in the stool AND [2] moderate or large amount of blood   Negative: Black or tarry bowel movements (Exception: chronic-unchanged black-grey bowel movements AND is taking iron pills or Pepto-bismol)   Negative: [1] Drinking very little AND [2] dehydration suspected (e.g., no urine > 12 hours, very dry mouth, very lightheaded)   Negative: Patient sounds very sick or weak to the triager   Negative: [1] SEVERE diarrhea (e.g., 7 or more times / day more than normal) AND [2] age > 60 years   Negative: [1] Constant abdominal pain AND [2] present > 2 hours   Negative: [1] Fever > 103 F (39.4 C) AND [2] not able to get the fever down using Fever Care Advice    Protocols used: Diarrhea-A-AH

## 2023-03-27 ENCOUNTER — TELEPHONE (OUTPATIENT)
Dept: SURGERY | Facility: CLINIC | Age: 78
End: 2023-03-27
Payer: MEDICARE

## 2023-03-27 NOTE — TELEPHONE ENCOUNTER
Spoke with patient regarding phone room message received. No concerns regarding diarrhea. Patient's pain well managed, only taking Tylenol PRN.   Ambulating throughout home. Fitted bra worn. Drain produced 55 cc yesterday. No additional concerns at this time.

## 2023-03-29 LAB
FINAL PATHOLOGIC DIAGNOSIS: NORMAL
GROSS: NORMAL
Lab: NORMAL

## 2023-03-31 ENCOUNTER — OFFICE VISIT (OUTPATIENT)
Dept: SURGERY | Facility: CLINIC | Age: 78
End: 2023-03-31
Payer: MEDICARE

## 2023-03-31 VITALS
WEIGHT: 156 LBS | HEART RATE: 79 BPM | BODY MASS INDEX: 28.71 KG/M2 | DIASTOLIC BLOOD PRESSURE: 74 MMHG | HEIGHT: 62 IN | SYSTOLIC BLOOD PRESSURE: 171 MMHG | OXYGEN SATURATION: 95 % | TEMPERATURE: 99 F

## 2023-03-31 DIAGNOSIS — Z17.0 MALIGNANT NEOPLASM OF OVERLAPPING SITES OF LEFT BREAST IN FEMALE, ESTROGEN RECEPTOR POSITIVE: ICD-10-CM

## 2023-03-31 DIAGNOSIS — Z09 POSTOP CHECK: Primary | ICD-10-CM

## 2023-03-31 DIAGNOSIS — C50.812 MALIGNANT NEOPLASM OF OVERLAPPING SITES OF LEFT BREAST IN FEMALE, ESTROGEN RECEPTOR POSITIVE: ICD-10-CM

## 2023-03-31 PROCEDURE — 3077F PR MOST RECENT SYSTOLIC BLOOD PRESSURE >= 140 MM HG: ICD-10-PCS | Mod: CPTII,S$GLB,, | Performed by: NURSE PRACTITIONER

## 2023-03-31 PROCEDURE — 3072F PR LOW RISK FOR RETINOPATHY: ICD-10-PCS | Mod: CPTII,S$GLB,, | Performed by: NURSE PRACTITIONER

## 2023-03-31 PROCEDURE — 99999 PR PBB SHADOW E&M-EST. PATIENT-LVL III: ICD-10-PCS | Mod: PBBFAC,,, | Performed by: NURSE PRACTITIONER

## 2023-03-31 PROCEDURE — 3078F DIAST BP <80 MM HG: CPT | Mod: CPTII,S$GLB,, | Performed by: NURSE PRACTITIONER

## 2023-03-31 PROCEDURE — 99024 PR POST-OP FOLLOW-UP VISIT: ICD-10-PCS | Mod: S$GLB,,, | Performed by: NURSE PRACTITIONER

## 2023-03-31 PROCEDURE — 99024 POSTOP FOLLOW-UP VISIT: CPT | Mod: S$GLB,,, | Performed by: NURSE PRACTITIONER

## 2023-03-31 PROCEDURE — 3078F PR MOST RECENT DIASTOLIC BLOOD PRESSURE < 80 MM HG: ICD-10-PCS | Mod: CPTII,S$GLB,, | Performed by: NURSE PRACTITIONER

## 2023-03-31 PROCEDURE — 1101F PT FALLS ASSESS-DOCD LE1/YR: CPT | Mod: CPTII,S$GLB,, | Performed by: NURSE PRACTITIONER

## 2023-03-31 PROCEDURE — 3072F LOW RISK FOR RETINOPATHY: CPT | Mod: CPTII,S$GLB,, | Performed by: NURSE PRACTITIONER

## 2023-03-31 PROCEDURE — 1101F PR PT FALLS ASSESS DOC 0-1 FALLS W/OUT INJ PAST YR: ICD-10-PCS | Mod: CPTII,S$GLB,, | Performed by: NURSE PRACTITIONER

## 2023-03-31 PROCEDURE — 1159F MED LIST DOCD IN RCRD: CPT | Mod: CPTII,S$GLB,, | Performed by: NURSE PRACTITIONER

## 2023-03-31 PROCEDURE — 3077F SYST BP >= 140 MM HG: CPT | Mod: CPTII,S$GLB,, | Performed by: NURSE PRACTITIONER

## 2023-03-31 PROCEDURE — 1125F PR PAIN SEVERITY QUANTIFIED, PAIN PRESENT: ICD-10-PCS | Mod: CPTII,S$GLB,, | Performed by: NURSE PRACTITIONER

## 2023-03-31 PROCEDURE — 1125F AMNT PAIN NOTED PAIN PRSNT: CPT | Mod: CPTII,S$GLB,, | Performed by: NURSE PRACTITIONER

## 2023-03-31 PROCEDURE — 99999 PR PBB SHADOW E&M-EST. PATIENT-LVL III: CPT | Mod: PBBFAC,,, | Performed by: NURSE PRACTITIONER

## 2023-03-31 PROCEDURE — 3288F FALL RISK ASSESSMENT DOCD: CPT | Mod: CPTII,S$GLB,, | Performed by: NURSE PRACTITIONER

## 2023-03-31 PROCEDURE — 3288F PR FALLS RISK ASSESSMENT DOCUMENTED: ICD-10-PCS | Mod: CPTII,S$GLB,, | Performed by: NURSE PRACTITIONER

## 2023-03-31 PROCEDURE — 1159F PR MEDICATION LIST DOCUMENTED IN MEDICAL RECORD: ICD-10-PCS | Mod: CPTII,S$GLB,, | Performed by: NURSE PRACTITIONER

## 2023-03-31 NOTE — PROGRESS NOTES
Post-Op  [facility]  Department of Surgery    REFERRING PROVIDER: No referring provider defined for this encounter. Gill Campbell MD    MEDICAL ONCOLOGIST:    Pending   RADIATION ONCOLOGIST:   Pending     DIAGNOSIS:    This is a 78 y.o. female with a stage pT1b N0 grade 1 ER + [place] + HER2 - IDC of the left breast.    TREATMENT SUMMARY:  The patient is status post left total mastectomy and sentinel node biopsy on 3/22/2023.  Final pathology showed (1 cm) IDC and DCIS with clear margins; invasive cancer is 2 mm from posterior margin and all other margins are greater than 10 mm. 1 lymph node was negative.     INTERVAL HISTORY:   Winnie Ngo comes in for a post-op check.  She denies fever, chills, chest pain or shortness of breath.  Her pain is well controlled. Does report have diarrhea since surgery. Has used imodium with some improvement. Over the past few days she has been doing BRAT diet with improvement. Her last BM yesterday was more formed so she reports will try other food today. Patient reports her drain output is decreasing but unsure how much the output has been over the last 2 days.     MEDICATIONS:  Current Outpatient Medications   Medication Sig Dispense Refill    atorvastatin (LIPITOR) 10 MG tablet Take 1 tablet (10 mg total) by mouth every evening. 90 tablet 1    cholecalciferol, vitamin D3, (VITAMIN D3) 25 mcg (1,000 unit) capsule Take 4,000 Units by mouth once daily. Per Dr. Flores      fluticasone-salmeterol diskus inhaler 100-50 mcg Inhale 1 puff into the lungs 2 (two) times daily. 60 each 6    LORazepam (ATIVAN) 1 MG tablet TAKE 1 TABLET(1 MG) BY MOUTH EVERY NIGHT AS NEEDED FOR ANXIETY 30 tablet 3    melatonin 1 mg Tab Take 1 tablet by mouth every evening.      pantoprazole (PROTONIX) 20 MG tablet Take 1 tablet (20 mg total) by mouth once daily. 90 tablet 3    albuterol (PROVENTIL/VENTOLIN HFA) 90 mcg/actuation inhaler Inhale 2 puffs into the lungs every 6 (six) hours as needed for  Wheezing. May substitute any albuterol inhaler on her plan. (Patient not taking: Reported on 3/31/2023) 18 g 3    blood sugar diagnostic (BLOOD GLUCOSE TEST) Strp One test strip per glucose testing twice a day: Insurance Brand Preference (Patient not taking: Reported on 3/31/2023) 180 strip 3    blood-glucose meter Misc Dispense one meter: Insurance Brand Preference (Patient not taking: Reported on 3/31/2023) 1 each 0    lancets Misc 1 each by Misc.(Non-Drug; Combo Route) route once daily. (Patient not taking: Reported on 2/28/2023) 100 each 11    oxyCODONE (ROXICODONE) 5 MG immediate release tablet Take 1 tablet (5 mg total) by mouth every 4 (four) hours as needed for Pain. (Patient not taking: Reported on 3/31/2023) 15 tablet 0     No current facility-administered medications for this visit.       ALLERGIES:   Review of patient's allergies indicates:   Allergen Reactions    Montelukast Diarrhea     Other reaction(s): Diarrhea  Other reaction(s): Diarrhea    Adhesive      Other reaction(s): Rash / blisters - can use paper tape    Alendronate      Other reaction(s): Stomach upset  Other reaction(s): Stomach upset    Aspirin      Other reaction(s): Wheezing    Aspirin      Other reaction(s): Wheezing    Azithromycin Other (See Comments)    Macrodantin [nitrofurantoin macrocrystalline]     Moxifloxacin      Other reaction(s): Diarrhea  Other reaction(s): Diarrhea    Nitrofurantoin monohyd/m-cryst      Other reaction(s): Nausea  Other reaction(s): Nausea    Penicillins      Other reaction(s): Rash / can take Cipro - and Doxycycline    Sulfa (sulfonamide antibiotics)      Other reaction(s): Swelling  Other reaction(s): Hives    Sulfur      Other reaction(s): Hives  Other reaction(s): Swelling       PHYSICAL EXAMINATION:   General:  This is a well appearing female with appropriate speech, affect and gait.     Breast:  Incision clean, dry, and intact. OSKAR drain intact and patent.       IMPRESSION:   The patient has had an  uneventful postoperative course. OSKAR bulb changed.     PLAN:   1. Path reviewed   2. return Thursday for follow up with Dr. Cabrera  3. Encouraged to continue with compression bra, drain care, and pain medication regimen.

## 2023-04-06 ENCOUNTER — PATIENT MESSAGE (OUTPATIENT)
Dept: HEMATOLOGY/ONCOLOGY | Facility: CLINIC | Age: 78
End: 2023-04-06
Payer: MEDICARE

## 2023-04-06 ENCOUNTER — OFFICE VISIT (OUTPATIENT)
Dept: SURGERY | Facility: CLINIC | Age: 78
End: 2023-04-06
Payer: MEDICARE

## 2023-04-06 VITALS
HEART RATE: 71 BPM | DIASTOLIC BLOOD PRESSURE: 63 MMHG | TEMPERATURE: 98 F | SYSTOLIC BLOOD PRESSURE: 135 MMHG | OXYGEN SATURATION: 94 % | HEIGHT: 62 IN | WEIGHT: 156 LBS | BODY MASS INDEX: 28.71 KG/M2

## 2023-04-06 DIAGNOSIS — C50.812 MALIGNANT NEOPLASM OF OVERLAPPING SITES OF LEFT BREAST IN FEMALE, ESTROGEN RECEPTOR POSITIVE: ICD-10-CM

## 2023-04-06 DIAGNOSIS — Z17.0 MALIGNANT NEOPLASM OF OVERLAPPING SITES OF LEFT BREAST IN FEMALE, ESTROGEN RECEPTOR POSITIVE: ICD-10-CM

## 2023-04-06 DIAGNOSIS — Z12.31 SCREENING MAMMOGRAM, ENCOUNTER FOR: Primary | ICD-10-CM

## 2023-04-06 PROCEDURE — 3288F FALL RISK ASSESSMENT DOCD: CPT | Mod: CPTII,S$GLB,, | Performed by: SURGERY

## 2023-04-06 PROCEDURE — 1101F PR PT FALLS ASSESS DOC 0-1 FALLS W/OUT INJ PAST YR: ICD-10-PCS | Mod: CPTII,S$GLB,, | Performed by: SURGERY

## 2023-04-06 PROCEDURE — 1159F PR MEDICATION LIST DOCUMENTED IN MEDICAL RECORD: ICD-10-PCS | Mod: CPTII,S$GLB,, | Performed by: SURGERY

## 2023-04-06 PROCEDURE — 3075F PR MOST RECENT SYSTOLIC BLOOD PRESS GE 130-139MM HG: ICD-10-PCS | Mod: CPTII,S$GLB,, | Performed by: SURGERY

## 2023-04-06 PROCEDURE — 99024 PR POST-OP FOLLOW-UP VISIT: ICD-10-PCS | Mod: S$GLB,,, | Performed by: SURGERY

## 2023-04-06 PROCEDURE — 3075F SYST BP GE 130 - 139MM HG: CPT | Mod: CPTII,S$GLB,, | Performed by: SURGERY

## 2023-04-06 PROCEDURE — 1160F RVW MEDS BY RX/DR IN RCRD: CPT | Mod: CPTII,S$GLB,, | Performed by: SURGERY

## 2023-04-06 PROCEDURE — 99024 POSTOP FOLLOW-UP VISIT: CPT | Mod: S$GLB,,, | Performed by: SURGERY

## 2023-04-06 PROCEDURE — 1160F PR REVIEW ALL MEDS BY PRESCRIBER/CLIN PHARMACIST DOCUMENTED: ICD-10-PCS | Mod: CPTII,S$GLB,, | Performed by: SURGERY

## 2023-04-06 PROCEDURE — 99999 PR PBB SHADOW E&M-EST. PATIENT-LVL IV: CPT | Mod: PBBFAC,,, | Performed by: SURGERY

## 2023-04-06 PROCEDURE — 1125F PR PAIN SEVERITY QUANTIFIED, PAIN PRESENT: ICD-10-PCS | Mod: CPTII,S$GLB,, | Performed by: SURGERY

## 2023-04-06 PROCEDURE — 3072F PR LOW RISK FOR RETINOPATHY: ICD-10-PCS | Mod: CPTII,S$GLB,, | Performed by: SURGERY

## 2023-04-06 PROCEDURE — 1159F MED LIST DOCD IN RCRD: CPT | Mod: CPTII,S$GLB,, | Performed by: SURGERY

## 2023-04-06 PROCEDURE — 99999 PR PBB SHADOW E&M-EST. PATIENT-LVL IV: ICD-10-PCS | Mod: PBBFAC,,, | Performed by: SURGERY

## 2023-04-06 PROCEDURE — 3078F PR MOST RECENT DIASTOLIC BLOOD PRESSURE < 80 MM HG: ICD-10-PCS | Mod: CPTII,S$GLB,, | Performed by: SURGERY

## 2023-04-06 PROCEDURE — 1101F PT FALLS ASSESS-DOCD LE1/YR: CPT | Mod: CPTII,S$GLB,, | Performed by: SURGERY

## 2023-04-06 PROCEDURE — 3078F DIAST BP <80 MM HG: CPT | Mod: CPTII,S$GLB,, | Performed by: SURGERY

## 2023-04-06 PROCEDURE — 3288F PR FALLS RISK ASSESSMENT DOCUMENTED: ICD-10-PCS | Mod: CPTII,S$GLB,, | Performed by: SURGERY

## 2023-04-06 PROCEDURE — 1125F AMNT PAIN NOTED PAIN PRSNT: CPT | Mod: CPTII,S$GLB,, | Performed by: SURGERY

## 2023-04-06 PROCEDURE — 3072F LOW RISK FOR RETINOPATHY: CPT | Mod: CPTII,S$GLB,, | Performed by: SURGERY

## 2023-04-06 NOTE — Clinical Note
She is recovering well from her mastectomy surgery for a small, early stage I and favorable breast cancer.   I expect her to have an excellent prognosis.  Thanks again for sending her! Yokasta Cabrera MD Breast Surgical Oncology

## 2023-04-06 NOTE — PROGRESS NOTES
[facility]       Post-Op        REFERRING PHYSICIAN:         Gill Campbell MD    MEDICAL ONCOLOGIST:    pending  RADIATION ONCOLOGIST:   pending    DIAGNOSIS:    This is a 78 y.o. female with a stage pT1b N0 M0 grade 1 ER + [place] + HER2 - IDC of the left breast.    TREATMENT SUMMARY:  The patient is status post left total mastectomy and sentinel node biopsy on 3/22/2023.  Final pathology showed 10 mm IDC with clear margins. DCIS 9 mm from the posterior margin and greater than 10 mm from all other margins. 1/1 sentinel lymph node negative.    INTERVAL HISTORY:   Winnie Ngo comes in for a post-op check.  She denies fever, chills, chest pain or shortness of breath.  Her pain is well controlled. She endorses some "tightness" in her left shoulder.     MEDICATIONS:  Current Outpatient Medications   Medication Sig Dispense Refill    albuterol (PROVENTIL/VENTOLIN HFA) 90 mcg/actuation inhaler Inhale 2 puffs into the lungs every 6 (six) hours as needed for Wheezing. May substitute any albuterol inhaler on her plan. (Patient not taking: Reported on 3/31/2023) 18 g 3    atorvastatin (LIPITOR) 10 MG tablet Take 1 tablet (10 mg total) by mouth every evening. 90 tablet 1    blood sugar diagnostic (BLOOD GLUCOSE TEST) Strp One test strip per glucose testing twice a day: Insurance Brand Preference (Patient not taking: Reported on 3/31/2023) 180 strip 3    blood-glucose meter Misc Dispense one meter: Insurance Brand Preference (Patient not taking: Reported on 3/31/2023) 1 each 0    cholecalciferol, vitamin D3, (VITAMIN D3) 25 mcg (1,000 unit) capsule Take 4,000 Units by mouth once daily. Per Dr. Flores      fluticasone-salmeterol diskus inhaler 100-50 mcg Inhale 1 puff into the lungs 2 (two) times daily. 60 each 6    lancets Misc 1 each by Misc.(Non-Drug; Combo Route) route once daily. (Patient not taking: Reported on 2/28/2023) 100 each 11    LORazepam (ATIVAN) 1 MG tablet TAKE 1 TABLET(1 MG) BY MOUTH EVERY NIGHT AS " NEEDED FOR ANXIETY 30 tablet 3    melatonin 1 mg Tab Take 1 tablet by mouth every evening.      oxyCODONE (ROXICODONE) 5 MG immediate release tablet Take 1 tablet (5 mg total) by mouth every 4 (four) hours as needed for Pain. (Patient not taking: Reported on 3/31/2023) 15 tablet 0    pantoprazole (PROTONIX) 20 MG tablet Take 1 tablet (20 mg total) by mouth once daily. 90 tablet 3     No current facility-administered medications for this visit.       ALLERGIES:   Review of patient's allergies indicates:   Allergen Reactions    Montelukast Diarrhea     Other reaction(s): Diarrhea  Other reaction(s): Diarrhea    Adhesive      Other reaction(s): Rash / blisters - can use paper tape    Alendronate      Other reaction(s): Stomach upset  Other reaction(s): Stomach upset    Aspirin      Other reaction(s): Wheezing    Aspirin      Other reaction(s): Wheezing    Azithromycin Other (See Comments)    Macrodantin [nitrofurantoin macrocrystalline]     Moxifloxacin      Other reaction(s): Diarrhea  Other reaction(s): Diarrhea    Nitrofurantoin monohyd/m-cryst      Other reaction(s): Nausea  Other reaction(s): Nausea    Penicillins      Other reaction(s): Rash / can take Cipro - and Doxycycline    Sulfa (sulfonamide antibiotics)      Other reaction(s): Swelling  Other reaction(s): Hives    Sulfur      Other reaction(s): Hives  Other reaction(s): Swelling       PHYSICAL EXAMINATION:   General:  This is a well appearing female with appropriate speech, affect and gait.     Breast:  Incision healing well; it's clean, dry, and intact. Dermabond intact. OSKAR drain with minimal serous output.    IMPRESSION:   The patient has had an uneventful postoperative course.    PLAN:   1. return in 1 year for a follow up office visit and breast exam  2. right mammogram in February of next year  3. The patient is advised in continued exam of the breast chest wall and to report to this office sooner should she note any areas of abnormality or concern.    4. She has been instructed to meet with med onc for discussion of adjuvant treatment recommendations  5. Exercises recommended to improve tightness, she will reach out to us if she would like a referral to physical therapy  6. OSKAR drain removed in clinic

## 2023-04-11 ENCOUNTER — DOCUMENTATION ONLY (OUTPATIENT)
Dept: HEMATOLOGY/ONCOLOGY | Facility: CLINIC | Age: 78
End: 2023-04-11
Payer: MEDICARE

## 2023-04-11 NOTE — NURSING
Pt met with Dr Cabrera for post op appointment.  Med onc appt made, reviewed location, date and time, pt verbalized understanding.  No additional needs at present.   Oncology Navigation   Intake  Date of Diagnosis: 02/16/23  Cancer Type: Breast  Internal / External Referral: Internal  Date of Referral: 02/07/23  Initial Nurse Navigator Contact: 02/20/23  Referral to Initial Contact Timeline (days): 13  Date Worked: 04/07/23  First Appointment Available: 02/28/23  Appointment Date: 02/28/23  First Available Date vs. Scheduled Date (days): 0     Treatment  Current Status: Staging work-up    Surgery: Planned  Surgical Oncologist: Deborah  Type of Surgery: Left mastectomy with DLNB and possible ALND  Consult Date: 02/28/23    Medical Oncologist: Jamal  Consult Date: 04/19/23       Procedures: Genetic test  Biopsy Schedule Date: 02/16/23  Genetic Testing Date Sent: 02/28/23  Mammo Schedule Date: 02/16/23  Ultrasound Schedule Date: 02/16/23    Physical Therapy Referral Date: 03/03/23          Support Systems: Family members     Acuity  Hospitalization Within the Past Month: 0   Needed: 0  Support: 0  Verbalizes Financial Concerns: 0  Transportation: 0  History of noncompliance/frequent no shows and cancellations: 0  Verbalizes the need for more education: 0  Navigation Acuity: 0     Follow Up  No follow-ups on file.

## 2023-04-12 ENCOUNTER — PATIENT MESSAGE (OUTPATIENT)
Dept: HEMATOLOGY/ONCOLOGY | Facility: CLINIC | Age: 78
End: 2023-04-12
Payer: MEDICARE

## 2023-04-14 ENCOUNTER — PATIENT MESSAGE (OUTPATIENT)
Dept: HEMATOLOGY/ONCOLOGY | Facility: CLINIC | Age: 78
End: 2023-04-14
Payer: MEDICARE

## 2023-04-25 ENCOUNTER — PATIENT MESSAGE (OUTPATIENT)
Dept: INTERNAL MEDICINE | Facility: CLINIC | Age: 78
End: 2023-04-25
Payer: MEDICARE

## 2023-04-25 DIAGNOSIS — Z85.3 HX: BREAST CANCER: ICD-10-CM

## 2023-04-25 DIAGNOSIS — E55.9 VITAMIN D DEFICIENCY, UNSPECIFIED: ICD-10-CM

## 2023-04-25 DIAGNOSIS — E11.9 CONTROLLED TYPE 2 DIABETES MELLITUS WITHOUT COMPLICATION, WITHOUT LONG-TERM CURRENT USE OF INSULIN: Primary | ICD-10-CM

## 2023-04-28 ENCOUNTER — PES CALL (OUTPATIENT)
Dept: ADMINISTRATIVE | Facility: CLINIC | Age: 78
End: 2023-04-28
Payer: MEDICARE

## 2023-05-01 ENCOUNTER — LAB VISIT (OUTPATIENT)
Dept: LAB | Facility: HOSPITAL | Age: 78
End: 2023-05-01
Attending: INTERNAL MEDICINE
Payer: MEDICARE

## 2023-05-01 DIAGNOSIS — E11.9 CONTROLLED TYPE 2 DIABETES MELLITUS WITHOUT COMPLICATION, WITHOUT LONG-TERM CURRENT USE OF INSULIN: ICD-10-CM

## 2023-05-01 DIAGNOSIS — E55.9 VITAMIN D DEFICIENCY, UNSPECIFIED: ICD-10-CM

## 2023-05-01 DIAGNOSIS — Z85.3 HX: BREAST CANCER: ICD-10-CM

## 2023-05-01 LAB
25(OH)D3+25(OH)D2 SERPL-MCNC: 47 NG/ML (ref 30–96)
CEA SERPL-MCNC: 1.7 NG/ML (ref 0–5)
CHOLEST SERPL-MCNC: 136 MG/DL (ref 120–199)
CHOLEST/HDLC SERPL: 2.8 {RATIO} (ref 2–5)
ESTIMATED AVG GLUCOSE: 151 MG/DL (ref 68–131)
HBA1C MFR BLD: 6.9 % (ref 4–5.6)
HDLC SERPL-MCNC: 49 MG/DL (ref 40–75)
HDLC SERPL: 36 % (ref 20–50)
LDLC SERPL CALC-MCNC: 65.6 MG/DL (ref 63–159)
NONHDLC SERPL-MCNC: 87 MG/DL
TRIGL SERPL-MCNC: 107 MG/DL (ref 30–150)

## 2023-05-01 PROCEDURE — 80061 LIPID PANEL: CPT | Performed by: INTERNAL MEDICINE

## 2023-05-01 PROCEDURE — 36415 COLL VENOUS BLD VENIPUNCTURE: CPT | Mod: PO | Performed by: INTERNAL MEDICINE

## 2023-05-01 PROCEDURE — 82306 VITAMIN D 25 HYDROXY: CPT | Performed by: INTERNAL MEDICINE

## 2023-05-01 PROCEDURE — 82378 CARCINOEMBRYONIC ANTIGEN: CPT | Performed by: INTERNAL MEDICINE

## 2023-05-01 PROCEDURE — 83036 HEMOGLOBIN GLYCOSYLATED A1C: CPT | Performed by: INTERNAL MEDICINE

## 2023-05-03 ENCOUNTER — OFFICE VISIT (OUTPATIENT)
Dept: INTERNAL MEDICINE | Facility: CLINIC | Age: 78
End: 2023-05-03
Payer: MEDICARE

## 2023-05-03 VITALS
HEART RATE: 80 BPM | WEIGHT: 152.31 LBS | OXYGEN SATURATION: 96 % | DIASTOLIC BLOOD PRESSURE: 70 MMHG | HEIGHT: 62 IN | BODY MASS INDEX: 28.03 KG/M2 | SYSTOLIC BLOOD PRESSURE: 128 MMHG

## 2023-05-03 DIAGNOSIS — M81.0 OSTEOPOROSIS, UNSPECIFIED OSTEOPOROSIS TYPE, UNSPECIFIED PATHOLOGICAL FRACTURE PRESENCE: ICD-10-CM

## 2023-05-03 DIAGNOSIS — E11.9 DIET-CONTROLLED DIABETES MELLITUS: Primary | ICD-10-CM

## 2023-05-03 DIAGNOSIS — E11.9 CONTROLLED TYPE 2 DIABETES MELLITUS WITHOUT COMPLICATION, WITHOUT LONG-TERM CURRENT USE OF INSULIN: ICD-10-CM

## 2023-05-03 DIAGNOSIS — R19.7 DIARRHEA, UNSPECIFIED TYPE: ICD-10-CM

## 2023-05-03 DIAGNOSIS — E78.5 DYSLIPIDEMIA ASSOCIATED WITH TYPE 2 DIABETES MELLITUS: ICD-10-CM

## 2023-05-03 DIAGNOSIS — E11.69 DYSLIPIDEMIA ASSOCIATED WITH TYPE 2 DIABETES MELLITUS: ICD-10-CM

## 2023-05-03 DIAGNOSIS — K21.9 GASTROESOPHAGEAL REFLUX DISEASE WITHOUT ESOPHAGITIS: ICD-10-CM

## 2023-05-03 DIAGNOSIS — C50.812 MALIGNANT NEOPLASM OF OVERLAPPING SITES OF LEFT BREAST IN FEMALE, ESTROGEN RECEPTOR POSITIVE: ICD-10-CM

## 2023-05-03 DIAGNOSIS — Z17.0 MALIGNANT NEOPLASM OF OVERLAPPING SITES OF LEFT BREAST IN FEMALE, ESTROGEN RECEPTOR POSITIVE: ICD-10-CM

## 2023-05-03 PROCEDURE — 3074F PR MOST RECENT SYSTOLIC BLOOD PRESSURE < 130 MM HG: ICD-10-PCS | Mod: CPTII,S$GLB,, | Performed by: INTERNAL MEDICINE

## 2023-05-03 PROCEDURE — 99999 PR PBB SHADOW E&M-EST. PATIENT-LVL IV: CPT | Mod: PBBFAC,,, | Performed by: INTERNAL MEDICINE

## 2023-05-03 PROCEDURE — 3288F PR FALLS RISK ASSESSMENT DOCUMENTED: ICD-10-PCS | Mod: CPTII,S$GLB,, | Performed by: INTERNAL MEDICINE

## 2023-05-03 PROCEDURE — 1159F PR MEDICATION LIST DOCUMENTED IN MEDICAL RECORD: ICD-10-PCS | Mod: CPTII,S$GLB,, | Performed by: INTERNAL MEDICINE

## 2023-05-03 PROCEDURE — 1126F PR PAIN SEVERITY QUANTIFIED, NO PAIN PRESENT: ICD-10-PCS | Mod: CPTII,S$GLB,, | Performed by: INTERNAL MEDICINE

## 2023-05-03 PROCEDURE — 1160F PR REVIEW ALL MEDS BY PRESCRIBER/CLIN PHARMACIST DOCUMENTED: ICD-10-PCS | Mod: CPTII,S$GLB,, | Performed by: INTERNAL MEDICINE

## 2023-05-03 PROCEDURE — 1101F PR PT FALLS ASSESS DOC 0-1 FALLS W/OUT INJ PAST YR: ICD-10-PCS | Mod: CPTII,S$GLB,, | Performed by: INTERNAL MEDICINE

## 2023-05-03 PROCEDURE — 1126F AMNT PAIN NOTED NONE PRSNT: CPT | Mod: CPTII,S$GLB,, | Performed by: INTERNAL MEDICINE

## 2023-05-03 PROCEDURE — 3072F PR LOW RISK FOR RETINOPATHY: ICD-10-PCS | Mod: CPTII,S$GLB,, | Performed by: INTERNAL MEDICINE

## 2023-05-03 PROCEDURE — 99214 PR OFFICE/OUTPT VISIT, EST, LEVL IV, 30-39 MIN: ICD-10-PCS | Mod: S$GLB,,, | Performed by: INTERNAL MEDICINE

## 2023-05-03 PROCEDURE — 3078F PR MOST RECENT DIASTOLIC BLOOD PRESSURE < 80 MM HG: ICD-10-PCS | Mod: CPTII,S$GLB,, | Performed by: INTERNAL MEDICINE

## 2023-05-03 PROCEDURE — 1160F RVW MEDS BY RX/DR IN RCRD: CPT | Mod: CPTII,S$GLB,, | Performed by: INTERNAL MEDICINE

## 2023-05-03 PROCEDURE — 3078F DIAST BP <80 MM HG: CPT | Mod: CPTII,S$GLB,, | Performed by: INTERNAL MEDICINE

## 2023-05-03 PROCEDURE — 99999 PR PBB SHADOW E&M-EST. PATIENT-LVL IV: ICD-10-PCS | Mod: PBBFAC,,, | Performed by: INTERNAL MEDICINE

## 2023-05-03 PROCEDURE — 3288F FALL RISK ASSESSMENT DOCD: CPT | Mod: CPTII,S$GLB,, | Performed by: INTERNAL MEDICINE

## 2023-05-03 PROCEDURE — 1101F PT FALLS ASSESS-DOCD LE1/YR: CPT | Mod: CPTII,S$GLB,, | Performed by: INTERNAL MEDICINE

## 2023-05-03 PROCEDURE — 1159F MED LIST DOCD IN RCRD: CPT | Mod: CPTII,S$GLB,, | Performed by: INTERNAL MEDICINE

## 2023-05-03 PROCEDURE — 3074F SYST BP LT 130 MM HG: CPT | Mod: CPTII,S$GLB,, | Performed by: INTERNAL MEDICINE

## 2023-05-03 PROCEDURE — 99214 OFFICE O/P EST MOD 30 MIN: CPT | Mod: S$GLB,,, | Performed by: INTERNAL MEDICINE

## 2023-05-03 PROCEDURE — 3072F LOW RISK FOR RETINOPATHY: CPT | Mod: CPTII,S$GLB,, | Performed by: INTERNAL MEDICINE

## 2023-05-03 RX ORDER — DICYCLOMINE HYDROCHLORIDE 10 MG/1
10 CAPSULE ORAL
Qty: 120 CAPSULE | Refills: 0 | Status: SHIPPED | OUTPATIENT
Start: 2023-05-03 | End: 2023-06-02

## 2023-05-03 NOTE — PROGRESS NOTES
Subjective     Patient ID: Winnie Ngo is a 78 y.o. female.    Chief Complaint: Diabetes    HPI 78-year-old female presents to clinic today for follow-up diet-controlled diabetes dyslipidemia.  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  She is also starting physical therapy to work on range of motion to her left upper arm.  Her initial DCIS was in 2016.  She does report having some diarrhea initially after the surgery seems to have gotten a little bit better with increased fiber intake.  She does report a little bit of vaginal discharge denies any purulent discoloration or odor no itching no burning  Review of Systems  .  Otherwise negative     Objective     Physical Exam  General: Well-appearing, well-nourished.  No distress  HEENT: conjunctivae are normal.  Pupils are equal and reative to light.  TM's are clear and intact bilaterally.  Hearing is grossly normal.  Nasopharynx is clear.  Oropharynx is clear.  Neck: Supple.  No thyroid megaly.  No bruits.  Lymph: No cervical or supraclavicular adenopathy.  Heart: Regular rate and rhythm, without murmur, rub or gallop.  Lungs: Clear to auscultation; respiratory effort normal.  Abdomen: Soft, nontender, nondistended.  Normoactive bowel sounds.  No hepatomegaly.  No masses.  Extremities: Good distal pulses.  No edema.  Psych: Oriented to time person place.  Judgment and insight seem unimpaired.  Mood and affect are appropriate.  Surgical sites examined she still has some adhesive from the Dermabond     Assessment and Plan     Problem List Items Addressed This Visit       GERD (gastroesophageal reflux disease)    Malignant neoplasm of overlapping sites of left breast in female, estrogen receptor positive    Diet-controlled diabetes mellitus - Primary    Relevant Orders    CBC Auto Differential     Comprehensive Metabolic Panel    Lipid Panel    TSH    Hemoglobin A1C    Microalbumin/Creatinine Ratio, Urine     Other Visit Diagnoses       Dyslipidemia associated with type 2 diabetes mellitus        Relevant Orders    CBC Auto Differential    Comprehensive Metabolic Panel    Lipid Panel    TSH    Hemoglobin A1C    Microalbumin/Creatinine Ratio, Urine    Controlled type 2 diabetes mellitus without complication, without long-term current use of insulin        Osteoporosis, unspecified osteoporosis type, unspecified pathological fracture presence        Diarrhea, unspecified type        Relevant Medications    dicyclomine (BENTYL) 10 MG capsule            Winnie was seen today for diabetes.    Diagnoses and all orders for this visit:    Diet-controlled diabetes mellitus  -     CBC Auto Differential; Future  -     Comprehensive Metabolic Panel; Future  -     Lipid Panel; Future  -     TSH; Future  -     Hemoglobin A1C; Standing  -     Microalbumin/Creatinine Ratio, Urine; Standing  Controlled.  Continue current medical regimen.  Prescription refills addressed.  Followup advised. See after visit summary.  Malignant neoplasm of overlapping sites of left breast in female, estrogen receptor positive  Continue plans for follow-up with Surgical Oncology and upcoming consultation with Oncology she is also doing physical therapy to help with her stiffness and range of motion of her arm  Gastroesophageal reflux disease without esophagitis  Patient's symptoms are well controlled with PPI therapy discuss use benefit as well as potential adverse side effects.  Symptoms recurrence without medication.  Dyslipidemia associated with type 2 diabetes mellitus  -     CBC Auto Differential; Future  -     Comprehensive Metabolic Panel; Future  -     Lipid Panel; Future  -     TSH; Future  -     Hemoglobin A1C; Standing  -     Microalbumin/Creatinine Ratio, Urine; Standing  Controlled.  Continue current medical regimen.  Prescription  refills addressed.  Followup advised. See after visit summary.  Controlled type 2 diabetes mellitus without complication, without long-term current use of insulin    Osteoporosis, unspecified osteoporosis type, unspecified pathological fracture presence  Patient will consider follow-up with Endocrinology she is contemplating medication treatment such as Reclast  Diarrhea, unspecified type  -     dicyclomine (BENTYL) 10 MG capsule; Take 1 capsule (10 mg total) by mouth 4 (four) times daily before meals and nightly.  Discuss use benefit as well as potential adverse side effects possible IBS with diarrhea predominance follow-up if persistent refractory continue fiber

## 2023-05-08 ENCOUNTER — CLINICAL SUPPORT (OUTPATIENT)
Dept: REHABILITATION | Facility: HOSPITAL | Age: 78
End: 2023-05-08
Attending: SURGERY
Payer: MEDICARE

## 2023-05-08 DIAGNOSIS — M25.60 STIFFNESS DUE TO IMMOBILITY: ICD-10-CM

## 2023-05-08 DIAGNOSIS — M79.602 PAIN OF LEFT UPPER EXTREMITY: ICD-10-CM

## 2023-05-08 DIAGNOSIS — Z17.0 MALIGNANT NEOPLASM OF OVERLAPPING SITES OF LEFT BREAST IN FEMALE, ESTROGEN RECEPTOR POSITIVE: ICD-10-CM

## 2023-05-08 DIAGNOSIS — R53.1 WEAKNESS: ICD-10-CM

## 2023-05-08 DIAGNOSIS — C50.812 MALIGNANT NEOPLASM OF OVERLAPPING SITES OF LEFT BREAST IN FEMALE, ESTROGEN RECEPTOR POSITIVE: ICD-10-CM

## 2023-05-08 DIAGNOSIS — Z74.09 STIFFNESS DUE TO IMMOBILITY: ICD-10-CM

## 2023-05-08 PROCEDURE — 97110 THERAPEUTIC EXERCISES: CPT | Mod: PN

## 2023-05-08 PROCEDURE — 97165 OT EVAL LOW COMPLEX 30 MIN: CPT | Mod: PN

## 2023-05-08 NOTE — PLAN OF CARE
"[facility]  Evaluation     Date: 5/8/2023  Patient: Winnie Ngo  Chart Number: [id number]    Therapy Diagnosis:   Encounter Diagnosis   Name Primary?    Malignant neoplasm of overlapping sites of left breast in female, estrogen receptor positive      Physician: Yokasta Cabrera MD    Physician Orders: OT evaluate and treat  Medical Diagnosis: C50.812,Z17.0 (ICD-10-CM) - Malignant neoplasm of overlapping sites of left breast in female, estrogen receptor positive   Evaluation Date: 5/8/2023  Insurance Authorization period Expiration: 12/31/2023  Plan of Care Expiration Period: 7/28/2023    Visit # / Visits Authortized: 1 / 1  Time In:9:30  Time Out: 10:00  Total Billable Time: 30 minutes    Precautions: Standard and cancer      Subjective   History of Present Illness: Winnie is a 78 y.o. female that presents to [facility] at the [facility] [place] s/p Left breast surgery.   Pt states: "I have some stiffness in this arm but it's not too bad."    Surgery: MASTECTOMY-left (Left)  BIOPSY, LYMPH NODE, SENTINEL-left (Left)  INJECTION, FOR SENTINEL NODE IDENTIFICATION-left (Left)  Chemotherapy: not currently  Radiation: not currently  Chief complaint: stiffness    Patient's Goals for Therapy: to return to prior level of function     Hand dominance: Right    Pain:  Functional Pain Scale Rating 0-10:   0 /10 on average  0 /10 at best  0 /10 at worst    Occupation:  Retired    Functional Limitations/Social History:    Previous functional status includes: Independent with all ADLs.     Current FunctionalStatus   Home/Living environment : lives with their son      Limitation of Functional Status as follows:   ADLs/IADLs:     - Feeding: Independent    - Bathing: Independent    - Dressing/Grooming: Independent    - Driving: Independent     Leisure: Independent    Past Medical History:   Diagnosis Date    Allergy     Anxiety     Asthma     " Benign neoplasm of colon     Breast cancer     Cancer     colon    Cataract     Chronic instability of knee, unspecified knee     rt knee cap displaced    Colon cancer     COPD (chronic obstructive pulmonary disease)     Diabetes mellitus     Dry eye syndrome     Fever blister     GERD (gastroesophageal reflux disease)     Hyperlipidemia     Joint pain     Osteopenia     Personal history of malignant neoplasm of large intestine     PONV (postoperative nausea and vomiting)      Winnie Ngo  has a past surgical history that includes Colon surgery; Hysterectomy; Total abdominal hysterectomy w/ bilateral salpingoophorectomy; Upper endoscopy w/ banding (2004); Appendectomy; Cholecystectomy; Sinus surgery; Knee surgery; Nasal septum surgery; sigmoid colectomy; Breast lumpectomy (Left, 2016); Breast biopsy (Right, 1980's); Breast biopsy (Left, 2016); Breast biopsy (Right, 2021); Eye surgery; Mastectomy (Left, 3/22/2023); [place] lymph node biopsy (Left, 3/22/2023); Injection for sentinel node identification (Left, 3/22/2023); and Axillary node dissection (Left, 3/22/2023).    Winnie has a current medication list which includes the following prescription(s): albuterol, atorvastatin, blood sugar diagnostic, blood-glucose meter, cholecalciferol (vitamin d3), dicyclomine, fluticasone-salmeterol 100-50 mcg/dose, lancets, lorazepam, melatonin, and pantoprazole.    Review of patient's allergies indicates:   Allergen Reactions    Montelukast Diarrhea     Other reaction(s): Diarrhea  Other reaction(s): Diarrhea    Adhesive      Other reaction(s): Rash / blisters - can use paper tape    Alendronate      Other reaction(s): Stomach upset  Other reaction(s): Stomach upset    Aspirin      Other reaction(s): Wheezing    Aspirin      Other reaction(s): Wheezing    Azithromycin Other (See Comments)    Macrodantin [nitrofurantoin macrocrystalline]     Moxifloxacin      Other reaction(s): Diarrhea  Other reaction(s): Diarrhea     Nitrofurantoin monohyd/m-cryst      Other reaction(s): Nausea  Other reaction(s): Nausea    Penicillins      Other reaction(s): Rash / can take Cipro - and Doxycycline    Sulfa (sulfonamide antibiotics)      Other reaction(s): Swelling  Other reaction(s): Hives    Sulfur      Other reaction(s): Hives  Other reaction(s): Swelling        Objective   Mental status :alert    Postural examination/scapula alignment: Rounded shoulder    Skin Integrity:   Scar Location: Left chest  Appearance: healing  Signs of infection: No    Edema: none noted    Axillary Web Syndrome/Cording: none noted    Sensation: WNL         Range of Motion:     Range of Motion/Strength:   Shoulder  Left   Right  Pain/Dysfunction with Movement    AROM PROM MMT AROM PROM MMT    Flexion 125 WNL 4/5 135 WNL 5/5    Extension 60 WNL 4/5 60 WNL 5/5    Abduction 100 WNL 4/5 135 WNL 5/5    HorizAdduction 15 WNL 4/5 30 WNL 5/5    Internal rotation L4 WNL 4/5 L4 WNL 5/5    ER at 90° abd 80 WNL 4/5 85 WNL 5/5    ER at 0° abd 90 WNL 4/5 90 WNL 5/5      ROM Comments: Pain at end range    Painful Arc: none noted    Tenderness upon Palpation:      Positive: around incisions    Scapular Control/Dyskinesis:    Normal / Subtle / Obvious  Comments    Left  normal -   Right  normal -       CMS Impairment/Limitation/Restriction for FOTO Cancer Survey    Therapist reviewed FOTO scores for Winnie Ngo on 5/8/2023.   FOTO documents entered into [employer] - see Media section.    Limitation Score: 22%  Category: Self Care       TREATMENT   Treatment Time In: 10:22  Treatment Time Out: 10:30  Total Treatment time separate from Evaluation: 8 minutes    Winnie received therapeutic exercises to develop ROM for 8 minutes including:  Shoulder flexion with the wand 5 repetitions, Sidelying Abduction 5 repetitions, Theraband pull downs 5 repetitions, Theraband Rows 5 repetitions, Theraband External Rotation 5 repetitions, Theraband Internal Rotation 5 repetitions, Theraband Horizontal  "Abduction 5 repetitions, wall pushups 5 repetitions, corner pectoralis stretch 1/30", Lumbar stretch 1/30", Lower Trunk rotation with Butterfly stretch 3/30"      Home Exercises and Patient Education Provided    Education provided:   -- role of therapy in multi - disciplinary team, goals for therapy  - Pt was educated in lymphedema etiology and management plans.    - Pt was provided with written risk reductions and precautions for managing lymphedema.     Written Home Exercises Provided: Patient instructed to cont prior HEP.  Exercises were reviewed and Winnie was able to demonstrate them prior to the end of the session.  Winnie demonstrated good  understanding of the education provided.     See EMR under Patient Instructions for exercises provided prior visit.    Assessment     Winnie Ngo is a 78 y.o. female referred to outpatient occupational therapy and presents with a medical diagnosis of Left breast cancer , resulting in Decreased ROM, Decreased muscle strength, and Scar Adhesions and demonstrates limitations as described in the chart below. Following medical record review it is determined that pt will benefit from occupational therapy services in order to maximize pain free and/or functional use of left shoulder. The following goals were discussed with the patient and patient is in agreement with them as to be addressed in the treatment plan. The patient's rehab potential is Good.     Anticipated barriers to occupational therapy: none    Pt has no cultural, educational or language barriers to learning provided.    Profile and History Assessment of Occupational Performance Level of Clinical Decision Making Complexity Score   Occupational Profile:   Winnie Ngo is a 78 y.o. female who lives with their son and is currently retired. Winnie Ngo has difficulty with  housework/household chores  affecting his/her daily functional abilities. His/her main goal for therapy is to return to prior level of " function.     Comorbidities:   anxiety and history of cancer    Medical and Therapy History Review:   Expanded               Performance Deficits    Physical:  Muscle Power/Strength  Muscle Endurance  Skin Integrity/Scar Formation    Cognitive:  No Deficits    Psychosocial:    No Deficits     Clinical Decision Making:  low    Assessment Process:  Problem-Focused Assessments    Modification/Need for Assistance:  Not Necessary    Intervention Selection:  Several Treatment Options       low  Based on PMHX, co morbidities , data from assessments and functional level of assistance required with task and clinical presentation directly impacting function.       The following goals were discussed with the patient and patient is in agreement with them as to be addressed in the treatment plan.     Goals:     Long Term Goals: 4 weeks   1. Pt will demonstrate AROM WNL grossly on left shoulder for [place] with IADL's and ADL's  2. Pt will demonstrate MMT WNL grossly on left shoulder to improve tolerance to all functional activities pain free.  3. Pt will demonstrate full/maximized tissue mobility to increase ROM and promote healthy tissue to be pain free at discharge.   4. Pt will report decrease in overall worst pain to 1-2/10 at discharge.   5. Patient will be able to achieve less than or equal to 15% on FOTO shoulder survey demonstrating overall improved functional ability with upper extremity.   6. Patient will report compliance with walking program 5x week for 10 min each day to improve overall cardiovascular function and decrease cancer related fatigue at discharge.    Plan     Certification Period/Plan of care expiration: 5/8/2023 to 7/28/2023.    Pt to trial home exercise program on her own at this time. Should she have difficulty performing exercises on her own she could benefit from skilled Outpatient Occupational Therapy 1 times weekly for 10 weeks to include the following interventions: Manual therapy/joint  mobilizations, Modalities for pain management, Therapeutic exercises/activities., Strengthening, and Scar Management.      ANGEL Liao

## 2023-05-08 NOTE — PATIENT INSTRUCTIONS
Lumbar Rotation    Put your arms out to the side - 90 degrees - your arms should be in a T formation.   Feet on floor, slowly rock knees from side to side in small, pain-free range of motion. Allow lower back to rotate slightly, but keep shoulders flat on ground. Hold 15 seconds. Repeat each side. Repeat 5 times per side. Do 1-2 sessions per day.        Gentle Lower trunk rotation with butterfly stretch - only bring legs 1/2 the distance of the picture   3 x 10 reps each side

## 2023-05-10 ENCOUNTER — TELEPHONE (OUTPATIENT)
Dept: DERMATOLOGY | Facility: CLINIC | Age: 78
End: 2023-05-10
Payer: MEDICARE

## 2023-05-11 ENCOUNTER — OFFICE VISIT (OUTPATIENT)
Dept: PODIATRY | Facility: CLINIC | Age: 78
End: 2023-05-11
Payer: MEDICARE

## 2023-05-11 VITALS
BODY MASS INDEX: 27.86 KG/M2 | HEART RATE: 74 BPM | SYSTOLIC BLOOD PRESSURE: 130 MMHG | HEIGHT: 62 IN | DIASTOLIC BLOOD PRESSURE: 69 MMHG

## 2023-05-11 DIAGNOSIS — L60.9 DISEASE OF NAIL: Primary | ICD-10-CM

## 2023-05-11 DIAGNOSIS — I73.9 PVD (PERIPHERAL VASCULAR DISEASE): ICD-10-CM

## 2023-05-11 DIAGNOSIS — E11.9 ENCOUNTER FOR COMPREHENSIVE DIABETIC FOOT EXAMINATION, TYPE 2 DIABETES MELLITUS: ICD-10-CM

## 2023-05-11 PROCEDURE — 3072F LOW RISK FOR RETINOPATHY: CPT | Mod: CPTII,S$GLB,, | Performed by: STUDENT IN AN ORGANIZED HEALTH CARE EDUCATION/TRAINING PROGRAM

## 2023-05-11 PROCEDURE — 1126F AMNT PAIN NOTED NONE PRSNT: CPT | Mod: CPTII,S$GLB,, | Performed by: STUDENT IN AN ORGANIZED HEALTH CARE EDUCATION/TRAINING PROGRAM

## 2023-05-11 PROCEDURE — 99999 PR PBB SHADOW E&M-EST. PATIENT-LVL III: CPT | Mod: PBBFAC,,, | Performed by: STUDENT IN AN ORGANIZED HEALTH CARE EDUCATION/TRAINING PROGRAM

## 2023-05-11 PROCEDURE — 1126F PR PAIN SEVERITY QUANTIFIED, NO PAIN PRESENT: ICD-10-PCS | Mod: CPTII,S$GLB,, | Performed by: STUDENT IN AN ORGANIZED HEALTH CARE EDUCATION/TRAINING PROGRAM

## 2023-05-11 PROCEDURE — 99999 PR PBB SHADOW E&M-EST. PATIENT-LVL III: ICD-10-PCS | Mod: PBBFAC,,, | Performed by: STUDENT IN AN ORGANIZED HEALTH CARE EDUCATION/TRAINING PROGRAM

## 2023-05-11 PROCEDURE — 99203 PR OFFICE/OUTPT VISIT, NEW, LEVL III, 30-44 MIN: ICD-10-PCS | Mod: 25,S$GLB,, | Performed by: STUDENT IN AN ORGANIZED HEALTH CARE EDUCATION/TRAINING PROGRAM

## 2023-05-11 PROCEDURE — 11721 DEBRIDE NAIL 6 OR MORE: CPT | Mod: Q8,S$GLB,, | Performed by: STUDENT IN AN ORGANIZED HEALTH CARE EDUCATION/TRAINING PROGRAM

## 2023-05-11 PROCEDURE — 3078F DIAST BP <80 MM HG: CPT | Mod: CPTII,S$GLB,, | Performed by: STUDENT IN AN ORGANIZED HEALTH CARE EDUCATION/TRAINING PROGRAM

## 2023-05-11 PROCEDURE — 3078F PR MOST RECENT DIASTOLIC BLOOD PRESSURE < 80 MM HG: ICD-10-PCS | Mod: CPTII,S$GLB,, | Performed by: STUDENT IN AN ORGANIZED HEALTH CARE EDUCATION/TRAINING PROGRAM

## 2023-05-11 PROCEDURE — 99203 OFFICE O/P NEW LOW 30 MIN: CPT | Mod: 25,S$GLB,, | Performed by: STUDENT IN AN ORGANIZED HEALTH CARE EDUCATION/TRAINING PROGRAM

## 2023-05-11 PROCEDURE — 3072F PR LOW RISK FOR RETINOPATHY: ICD-10-PCS | Mod: CPTII,S$GLB,, | Performed by: STUDENT IN AN ORGANIZED HEALTH CARE EDUCATION/TRAINING PROGRAM

## 2023-05-11 PROCEDURE — 3075F PR MOST RECENT SYSTOLIC BLOOD PRESS GE 130-139MM HG: ICD-10-PCS | Mod: CPTII,S$GLB,, | Performed by: STUDENT IN AN ORGANIZED HEALTH CARE EDUCATION/TRAINING PROGRAM

## 2023-05-11 PROCEDURE — 1159F PR MEDICATION LIST DOCUMENTED IN MEDICAL RECORD: ICD-10-PCS | Mod: CPTII,S$GLB,, | Performed by: STUDENT IN AN ORGANIZED HEALTH CARE EDUCATION/TRAINING PROGRAM

## 2023-05-11 PROCEDURE — 1159F MED LIST DOCD IN RCRD: CPT | Mod: CPTII,S$GLB,, | Performed by: STUDENT IN AN ORGANIZED HEALTH CARE EDUCATION/TRAINING PROGRAM

## 2023-05-11 PROCEDURE — 11721 ROUTINE FOOT CARE: ICD-10-PCS | Mod: Q8,S$GLB,, | Performed by: STUDENT IN AN ORGANIZED HEALTH CARE EDUCATION/TRAINING PROGRAM

## 2023-05-11 PROCEDURE — 3075F SYST BP GE 130 - 139MM HG: CPT | Mod: CPTII,S$GLB,, | Performed by: STUDENT IN AN ORGANIZED HEALTH CARE EDUCATION/TRAINING PROGRAM

## 2023-05-11 NOTE — PROCEDURES
"Routine Foot Care    Date/Time: 5/11/2023 1:00 PM  Performed by: Caro Baird DPM  Authorized by: Caro Baird DPM     Time out: Immediately prior to procedure a "time out" was called to verify the correct patient, procedure, equipment, support staff and site/side marked as required.    Consent Done?:  Yes (Verbal)  Hyperkeratotic Skin Lesions?: No      Nail Care Type:  Debride  Location(s): All  (Left 1st Toe, Left 3rd Toe, Left 2nd Toe, Left 4th Toe, Left 5th Toe, Right 1st Toe, Right 2nd Toe, Right 3rd Toe, Right 4th Toe and Right 5th Toe)  Patient tolerance:  Patient tolerated the procedure well with no immediate complications  "

## 2023-05-11 NOTE — PROGRESS NOTES
Subjective:     Patient ID: Winnie Ngo is a 78 y.o. female.    Chief Complaint: Diabetes Mellitus (5/3/23 PCP Dr. Campbell), Diabetic Foot Exam, Routine Foot Care, and Nail Problem (Toenail fungus)    Winnie is a 78 y.o. female who presents to the clinic upon referral from Dr. Nichole  for evaluation and treatment of diabetic feet. Winnie has a past medical history of Allergy, Anxiety, Asthma, Benign neoplasm of colon, Breast cancer, Cancer, Cataract, Chronic instability of knee, unspecified knee, Colon cancer, COPD (chronic obstructive pulmonary disease), Diabetes mellitus, Dry eye syndrome, Fever blister, GERD (gastroesophageal reflux disease), Hyperlipidemia, Joint pain, Osteopenia, Personal history of malignant neoplasm of large intestine, and PONV (postoperative nausea and vomiting). Patient relates no major problem with feet. Only complaints today consist of here for routine foot care. States has thickened toenails. No further pedal complaints.    PCP: Gill Campbell MD    Date Last Seen by PCP: 5/2/23    Current shoe gear: Casual shoes    Hemoglobin A1C   Date Value Ref Range Status   05/01/2023 6.9 (H) 4.0 - 5.6 % Final     Comment:     ADA Screening Guidelines:  5.7-6.4%  Consistent with prediabetes  >or=6.5%  Consistent with diabetes    High levels of fetal hemoglobin interfere with the HbA1C  assay. Heterozygous hemoglobin variants (HbS, HgC, etc)do  not significantly interfere with this assay.   However, presence of multiple variants may affect accuracy.     08/05/2022 7.0 (H) 4.0 - 5.6 % Final     Comment:     ADA Screening Guidelines:  5.7-6.4%  Consistent with prediabetes  >or=6.5%  Consistent with diabetes    High levels of fetal hemoglobin interfere with the HbA1C  assay. Heterozygous hemoglobin variants (HbS, HgC, etc)do  not significantly interfere with this assay.   However, presence of multiple variants may affect accuracy.     11/09/2021 6.1 (H) 4.0 - 5.6 % Final     Comment:     ADA Screening  Guidelines:  5.7-6.4%  Consistent with prediabetes  >or=6.5%  Consistent with diabetes    High levels of fetal hemoglobin interfere with the HbA1C  assay. Heterozygous hemoglobin variants (HbS, HgC, etc)do  not significantly interfere with this assay.   However, presence of multiple variants may affect accuracy.           Review of Systems   Constitutional: Negative for chills, decreased appetite, diaphoresis and fever.   HENT:  Negative for congestion and hearing loss.    Cardiovascular:  Positive for leg swelling. Negative for chest pain, claudication and syncope.   Respiratory:  Negative for cough and shortness of breath.    Skin:  Positive for dry skin and nail changes. Negative for color change, flushing, itching, poor wound healing and rash.   Musculoskeletal:  Negative for arthritis, back pain, joint pain and joint swelling.   Gastrointestinal:  Negative for nausea and vomiting.   Neurological:  Negative for focal weakness, numbness, paresthesias and weakness.   Psychiatric/Behavioral:  Negative for altered mental status. The patient is not nervous/anxious.       Objective:     Physical Exam  Constitutional:       General: She is not in acute distress.     Appearance: She is well-developed. She is not diaphoretic.   Cardiovascular:      Comments: Dorsalis pedis and posterior tibial pulses are mildly diminished. Skin temperature is within normal limits. Toes are cool to touch and feet are warm proximally. Hair growth is diminished. Skin is mildly atrophic and with mild hyperpigmentation. Mild edema noted, bilaterally. Telangiectasias bilaterally.  Musculoskeletal:         General: No tenderness.      Comments: Adequate joint range of motion without pain, limitation, nor crepitation to bilateral feet and ankle joints. Muscle strength is 5/5 in all groups bilaterally.       Lymphadenopathy:      Comments: Negative lymphangitic streaking    Skin:     General: Skin is warm and dry.      Findings: No lesion.       Comments: Skin is warm and dry, no acute signs of infection noted. No open wounds, macerations or hyperkeratotic lesions, bilaterally.     Toenails are thickened by 2-4 mm's, dystrophic, and are darkened in coloration with subungual fungal debris, bilaterally.  Skin is very dry, bilaterally.      Neurological:      Mental Status: She is alert and oriented to person, place, and time.      Sensory: No sensory deficit.      Motor: No abnormal muscle tone.      Comments: Light touch within normal limits. [place] 5.07 monofilamant testing is within normal limits.    Psychiatric:         Behavior: Behavior normal.         Thought Content: Thought content normal.         Judgment: Judgment normal.         Assessment:      Encounter Diagnoses   Name Primary?    Disease of nail Yes    PVD (peripheral vascular disease)     Encounter for comprehensive diabetic foot examination, type 2 diabetes mellitus      Plan:     Winnie was seen today for diabetes mellitus, diabetic foot exam, routine foot care and nail problem.    Diagnoses and all orders for this visit:    Disease of nail  -     Routine Foot Care    PVD (peripheral vascular disease)  -     Routine Foot Care    Encounter for comprehensive diabetic foot examination, type 2 diabetes mellitus      I counseled the patient on her conditions, their implications and medical management.  Routine foot care per attached note  Recommend OTC treatment for thickened toenails  Shoe inspection. Diabetic Foot Education. Patient reminded of the importance of good nutrition and blood sugar control to help prevent podiatric complications of diabetes. Patient instructed on proper foot hygeine. We discussed wearing proper shoe gear, daily foot inspections, never walking without protective shoe gear, never putting sharp instruments to feet, routine podiatric nail visits    Return to clinic in 3 mo, sooner PRN

## 2023-05-15 ENCOUNTER — OFFICE VISIT (OUTPATIENT)
Dept: HEMATOLOGY/ONCOLOGY | Facility: CLINIC | Age: 78
End: 2023-05-15
Payer: MEDICARE

## 2023-05-15 VITALS
BODY MASS INDEX: 28.34 KG/M2 | DIASTOLIC BLOOD PRESSURE: 61 MMHG | TEMPERATURE: 97 F | RESPIRATION RATE: 17 BRPM | OXYGEN SATURATION: 97 % | HEART RATE: 73 BPM | WEIGHT: 154 LBS | HEIGHT: 62 IN | SYSTOLIC BLOOD PRESSURE: 134 MMHG

## 2023-05-15 DIAGNOSIS — Z17.0 MALIGNANT NEOPLASM OF LOWER-INNER QUADRANT OF LEFT BREAST IN FEMALE, ESTROGEN RECEPTOR POSITIVE: Primary | ICD-10-CM

## 2023-05-15 DIAGNOSIS — C50.812 MALIGNANT NEOPLASM OF OVERLAPPING SITES OF LEFT BREAST IN FEMALE, ESTROGEN RECEPTOR POSITIVE: ICD-10-CM

## 2023-05-15 DIAGNOSIS — Z17.0 MALIGNANT NEOPLASM OF OVERLAPPING SITES OF LEFT BREAST IN FEMALE, ESTROGEN RECEPTOR POSITIVE: ICD-10-CM

## 2023-05-15 DIAGNOSIS — C50.312 MALIGNANT NEOPLASM OF LOWER-INNER QUADRANT OF LEFT BREAST IN FEMALE, ESTROGEN RECEPTOR POSITIVE: Primary | ICD-10-CM

## 2023-05-15 DIAGNOSIS — Z85.038 HISTORY OF COLON CANCER: ICD-10-CM

## 2023-05-15 DIAGNOSIS — Z86.000 HISTORY OF DUCTAL CARCINOMA IN SITU (DCIS) OF BREAST: ICD-10-CM

## 2023-05-15 PROCEDURE — 3288F FALL RISK ASSESSMENT DOCD: CPT | Mod: CPTII,S$GLB,, | Performed by: INTERNAL MEDICINE

## 2023-05-15 PROCEDURE — 1160F PR REVIEW ALL MEDS BY PRESCRIBER/CLIN PHARMACIST DOCUMENTED: ICD-10-PCS | Mod: CPTII,S$GLB,, | Performed by: INTERNAL MEDICINE

## 2023-05-15 PROCEDURE — 99205 PR OFFICE/OUTPT VISIT, NEW, LEVL V, 60-74 MIN: ICD-10-PCS | Mod: S$GLB,,, | Performed by: INTERNAL MEDICINE

## 2023-05-15 PROCEDURE — 3288F PR FALLS RISK ASSESSMENT DOCUMENTED: ICD-10-PCS | Mod: CPTII,S$GLB,, | Performed by: INTERNAL MEDICINE

## 2023-05-15 PROCEDURE — 1126F AMNT PAIN NOTED NONE PRSNT: CPT | Mod: CPTII,S$GLB,, | Performed by: INTERNAL MEDICINE

## 2023-05-15 PROCEDURE — 99205 OFFICE O/P NEW HI 60 MIN: CPT | Mod: S$GLB,,, | Performed by: INTERNAL MEDICINE

## 2023-05-15 PROCEDURE — 1160F RVW MEDS BY RX/DR IN RCRD: CPT | Mod: CPTII,S$GLB,, | Performed by: INTERNAL MEDICINE

## 2023-05-15 PROCEDURE — 3072F LOW RISK FOR RETINOPATHY: CPT | Mod: CPTII,S$GLB,, | Performed by: INTERNAL MEDICINE

## 2023-05-15 PROCEDURE — 1101F PT FALLS ASSESS-DOCD LE1/YR: CPT | Mod: CPTII,S$GLB,, | Performed by: INTERNAL MEDICINE

## 2023-05-15 PROCEDURE — 3078F PR MOST RECENT DIASTOLIC BLOOD PRESSURE < 80 MM HG: ICD-10-PCS | Mod: CPTII,S$GLB,, | Performed by: INTERNAL MEDICINE

## 2023-05-15 PROCEDURE — 99999 PR PBB SHADOW E&M-EST. PATIENT-LVL IV: CPT | Mod: PBBFAC,,, | Performed by: INTERNAL MEDICINE

## 2023-05-15 PROCEDURE — 99499 UNLISTED E&M SERVICE: CPT | Mod: S$GLB,,, | Performed by: INTERNAL MEDICINE

## 2023-05-15 PROCEDURE — 1159F MED LIST DOCD IN RCRD: CPT | Mod: CPTII,S$GLB,, | Performed by: INTERNAL MEDICINE

## 2023-05-15 PROCEDURE — 3075F SYST BP GE 130 - 139MM HG: CPT | Mod: CPTII,S$GLB,, | Performed by: INTERNAL MEDICINE

## 2023-05-15 PROCEDURE — 3075F PR MOST RECENT SYSTOLIC BLOOD PRESS GE 130-139MM HG: ICD-10-PCS | Mod: CPTII,S$GLB,, | Performed by: INTERNAL MEDICINE

## 2023-05-15 PROCEDURE — 3072F PR LOW RISK FOR RETINOPATHY: ICD-10-PCS | Mod: CPTII,S$GLB,, | Performed by: INTERNAL MEDICINE

## 2023-05-15 PROCEDURE — 99499 RISK ADDL DX/OHS AUDIT: ICD-10-PCS | Mod: S$GLB,,, | Performed by: INTERNAL MEDICINE

## 2023-05-15 PROCEDURE — 1126F PR PAIN SEVERITY QUANTIFIED, NO PAIN PRESENT: ICD-10-PCS | Mod: CPTII,S$GLB,, | Performed by: INTERNAL MEDICINE

## 2023-05-15 PROCEDURE — 1101F PR PT FALLS ASSESS DOC 0-1 FALLS W/OUT INJ PAST YR: ICD-10-PCS | Mod: CPTII,S$GLB,, | Performed by: INTERNAL MEDICINE

## 2023-05-15 PROCEDURE — 3078F DIAST BP <80 MM HG: CPT | Mod: CPTII,S$GLB,, | Performed by: INTERNAL MEDICINE

## 2023-05-15 PROCEDURE — 1159F PR MEDICATION LIST DOCUMENTED IN MEDICAL RECORD: ICD-10-PCS | Mod: CPTII,S$GLB,, | Performed by: INTERNAL MEDICINE

## 2023-05-15 PROCEDURE — 99999 PR PBB SHADOW E&M-EST. PATIENT-LVL IV: ICD-10-PCS | Mod: PBBFAC,,, | Performed by: INTERNAL MEDICINE

## 2023-05-15 RX ORDER — TAMOXIFEN CITRATE 20 MG/1
20 TABLET ORAL DAILY
Qty: 30 TABLET | Refills: 1 | Status: SHIPPED | OUTPATIENT
Start: 2023-05-15 | End: 2023-08-08 | Stop reason: SDUPTHER

## 2023-05-15 NOTE — PROGRESS NOTES
Subjective     Patient ID: Winnie Ngo is a 78 y.o. female.    Chief Complaint: Breast Cancer    HPI    DIAGNOSIS:    This is a 78 y.o. female recently diagnosed with Stage I, pT1b N0 M0, Grade 1, ER + CO + HER2 - IDC of the left breast.  Prior breast biopsies- and a prior lumpectomy years ago for benign finding right breast (1980s)    She also has a history of prior colon cancer-   T1N0M0 sigmoid colectomy on 9/18/2000 with Dr. Samuel  No chemotherapy required    Oncology History:  - 2/6/2023 Diagnostic Mammogram:  Findings:  This procedure was performed using tomosynthesis. Computer-aided detection was utilized in the interpretation of this examination.  The breasts are heterogeneously dense, which may obscure small masses.  In the left breast at 03:00 o'clock posterior depth there is an oval, equal density mass with a slightly spiculated margin that measures approximately 13 mm.  It is located approximately 1 cm posterior to a focal asymmetry in this region that has been stable on multiple prior mammograms.  No additional suspicious finding is present in either breast.  Ultrasound images of the left breast at 03:00 o'clock 5 cm from the nipple demonstrate an irregular, hypoechoic mass with a microlobulated margin that measures 8 x 10 x 7 mm.  This corresponds to the new mammographic mass in this region. Also in this region, at 03:00 o'clock 2 centimeters from the nipple there is an oval, heterogeneous mass that measures 12 x 6 x 8 mm; this corresponds to the focal asymmetry that is stable on mammography.  Images of the left axilla are unremarkable.  Impression:  Suspicious mass in the left breast at 03:00 o'clock 5 cm from the nipple.  Ultrasound-guided biopsy is recommended.  BI-RADS Category:   Overall: 4 - Suspicious  Recommendation:  Biopsy is recommended.    - 2/16/2023 Breast Biopsy:  LEFT BREAST, MASS AT 3 O'CLOCK, BIOPSY:   Invasive ductal carcinoma, [place] histologic grade 1 (tubule formation:   2,  nuclear pleomorphism:  1, mitotic activity:  1).   Comment:  The largest continuous focus of invasive carcinoma in the biopsy   measures 8 mm.  BREAST BIOMARKER RESULTS   Estrogen receptor (ER):  Positive (100%, strong)   Progesterone receptor (CA):  Positive (100%, strong)   HER2 IHC:  Negative (1+)   Ki-67 proliferation index:  20%     - 3/22/2023 Left Mastectomy   Pathology  Comment:  Synoptic report   Specimen:   Procedure:  Total mastectomy   Specimen laterality:  Left   Tumor:   Histologic type:  Invasive carcinoma of no special type (ductal)   Histologic grade (Gu[place] histologic score) Glandular/tubular   differentiation:  Score 2          Nuclear pleomorphism:  Score 2          Mitotic rate:  Score 1          Overall grade:  Grade 1   Tumor size:  Greatest dimension of largest invasive focus:  10 mm (1 cm)   Tumor focality:  Single focus of invasive carcinoma   Ductal carcinoma in Situ (DCIS):  Present Negative for extensive intraductal   component (EIC).          Architectural patterns:  Solid          Nuclear grade:  Grade II (intermediate)          Necrosis:  Not identified   Lobular carcinoma in Situ (LCIS):  Not identified   Tumor extent: Skin is present and uninvolved by tumor          Nipple is present and uninvolved by tumor          Skeletal muscle is not present for evaluation   Lymphovascular invasion:  Not identified   Microcalcifications:  Present in the nonneoplastic tissue.   Treatment effect in the breast:  No known presurgical therapy   Margins:  All margins are negative for invasive carcinoma and DCIS.  -   Invasive tumor margins:  Invasive tumor is located 2 mm (0.2 cm) from the   posterior/black inked surgical margin.  All other          margins are greater than 10 mm (greater than 1 cm) from invasive tumor.           - DCIS margins:  DCIS is located 9 mm (0.9 cm) from the   posterior/black inked surgical margin.  All other margins are          greater than 10 mm (greater than 1 cm)  "from DCIS.   Regional lymph nodes:  Regional lymph nodes are present and all regional   lymph nodes are negative for tumor. Number of lymph nodes with   macrometastasis:  0          Number of lymph nodes with micrometastasis:  0          Number of lymph nodes with isolated tumor cells:  0     Total number of lymph nodes examined (sentinel and nonsentinel):  1          Number of sentinel lymph nodes examined:  1   Distant metastasis:  Not applicable   Pathologic stage classification:   TNM descriptors:  Not applicable   Primary tumor:        pT1b:  Tumor greater than 5 mm but less than or equal to 10 mm in   greatest dimension.   Regional lymph nodes:        (sn)pN0:  No regional lymph node metastasis identified.   Distant metastasis:        pMX:  Cannot be assessed.     Special studies/biomarker reporting:   Immunohistochemical stains for biomarkers were completed on the patient's   previous left breas mass biopsy from 2023, case number   [facility]-, with the following results:   "Estrogen receptor (ER): Positive (100%, strong)   Progesterone receptor ([place]): Positive (100%, strong)   HER2 IHC: Negative (1+)   Ki-67 proliferation index: 20%   All immunostain results react appropriately."     - negative genetics    FH:  Oldest brother- kidney cancer,  of a bile duct cancer later  Another brother- tumor in leg removed at [facility], recurred later and now also RCC  Notes- may have had environmental exposure (grew up in [place])  Paternal aunt- breast cancer,  of unrelated brain tumor    SH:  Retired teacher    Her son lives with her now  No tobacco or EtOH    PMH:  Active Ambulatory Problems     Diagnosis Date Noted    GERD (gastroesophageal reflux disease)     Osteopenia     Surgery, elective:Hysterectomy oophorectomy  2012    Schatzki's ring 2012    History of colon cancer 2012    Mild vitamin D deficiency 2013    Osteoarthritis of left knee, severe 2013    Abnormal " gastrointestinal PET scan 06/21/2016    Bilateral dry eyes 03/13/2019    Nuclear sclerotic cataract of both eyes 03/13/2019    Mild persistent asthma 08/19/2020    History of ductal carcinoma in situ (DCIS) of breast 08/19/2020    Insomnia 11/12/2020    Panic disorder (episodic paroxysmal anxiety) 11/12/2020    Overweight (BMI 25.0-29.9) 11/12/2020    Malignant neoplasm of overlapping sites of left breast in female, estrogen receptor positive 03/08/2023    Diet-controlled diabetes mellitus 05/03/2023    Pain of left upper extremity 05/08/2023    Weakness 05/08/2023    Stiffness due to immobility 05/08/2023     Resolved Ambulatory Problems     Diagnosis Date Noted    History of endoscopy 07/12/2012    Pulmonary nodules 07/12/2012    Annual physical exam 07/12/2012    Kidney stones 07/12/2012    Atrophic vaginitis 10/22/2012    Colon adenomas 11/20/2012    Diarrhea 11/20/2012    Nonspecific (abnormal) findings on radiological and other examination of abdominal area, including retroperitoneum 05/09/2013    Colon cancer 05/09/2013    DJD (degenerative joint disease) of lumbar spine, mild 05/16/2013    Chronic LBP 05/16/2013    Low back pain 06/05/2013    Hip pain 06/05/2013    Pain in limb 06/05/2013    Right lumbar radiculopathy 07/23/2013    Trochanteric bursitis of right hip 07/23/2013    Asthma, mild persistent 11/05/2014    Elevated LDL cholesterol level 06/17/2016    Elevated glucose: checking A1C 6/16/2016 06/17/2016    DCIS (ductal carcinoma in situ) 10/21/2016    DCIS (ductal carcinoma in situ) of breast 12/02/2016    Uncontrolled type 2 diabetes mellitus without complication, without long-term current use of insulin 03/27/2017    Controlled type 2 diabetes mellitus, without long-term current use of insulin 08/19/2020    Hyperlipidemia associated with type 2 diabetes mellitus 11/12/2020     Past Medical History:   Diagnosis Date    Allergy     Anxiety     Asthma     Benign neoplasm of colon     Breast cancer      Cancer     Cataract     Chronic instability of knee, unspecified knee     COPD (chronic obstructive pulmonary disease)     Diabetes mellitus     Dry eye syndrome     Fever blister     Hyperlipidemia     Joint pain     Personal history of malignant neoplasm of large intestine     PONV (postoperative nausea and vomiting)      Hysterectomy  Bilateral ovaries removed with colon cancer      Review of Systems   Constitutional:  Negative for activity change, appetite change, chills, fatigue, fever and unexpected weight change.   HENT:  Negative for nasal congestion and trouble swallowing.    Eyes:  Negative for visual disturbance.   Respiratory:  Negative for cough, shortness of breath and wheezing.    Cardiovascular:  Negative for chest pain, palpitations and leg swelling.   Gastrointestinal:  Negative for abdominal distention, abdominal pain, blood in stool, constipation, diarrhea, nausea and vomiting.   Genitourinary:  Negative for decreased urine volume, difficulty urinating, dysuria, frequency, hematuria and urgency.   Musculoskeletal:  Negative for arthralgias, back pain, gait problem, joint swelling and neck pain.   Integumentary:  Negative for pallor and rash.   Neurological:  Negative for dizziness, weakness, light-headedness, numbness and headaches.   Hematological:  Negative for adenopathy. Does not bruise/bleed easily.   Psychiatric/Behavioral:  Negative for dysphoric mood and sleep disturbance. The patient is not nervous/anxious.         Objective     Physical Exam  Vitals and nursing note reviewed.   Constitutional:       General: She is not in acute distress.     Appearance: Normal appearance. She is well-developed. She is not ill-appearing.   HENT:      Head: Normocephalic and atraumatic.   Eyes:      General: No scleral icterus.     Extraocular Movements: Extraocular movements intact.      Conjunctiva/sclera: Conjunctivae normal.      Pupils: Pupils are equal, round, and reactive to light.      Right  eye: Pupil is round and reactive.      Left eye: Pupil is round and reactive.   Neck:      Thyroid: No thyromegaly.      Vascular: No JVD.      Trachea: No tracheal deviation.   Cardiovascular:      Rate and Rhythm: Normal rate and regular rhythm.      Heart sounds: Normal heart sounds. No murmur heard.    No friction rub. No gallop.   Pulmonary:      Effort: Pulmonary effort is normal. No respiratory distress.      Breath sounds: Normal breath sounds. No wheezing, rhonchi or rales.   Abdominal:      General: Bowel sounds are normal. There is no distension.      Palpations: Abdomen is soft. There is no mass.      Tenderness: There is no abdominal tenderness. There is no guarding or rebound.   Musculoskeletal:         General: No swelling or tenderness. Normal range of motion.      Cervical back: Normal range of motion and neck supple.      Right lower leg: No edema.      Left lower leg: No edema.   Lymphadenopathy:      Head:      Right side of head: No submandibular adenopathy.      Left side of head: No submandibular adenopathy.      Cervical: No cervical adenopathy.      Right cervical: No superficial, deep or posterior cervical adenopathy.     Left cervical: No superficial, deep or posterior cervical adenopathy.      Upper Body:      Right upper body: No supraclavicular adenopathy.      Left upper body: No supraclavicular adenopathy.   Skin:     General: Skin is warm and dry.      Coloration: Skin is not jaundiced or pale.      Findings: No bruising, erythema, lesion, petechiae or rash.   Neurological:      General: No focal deficit present.      Mental Status: She is alert and oriented to person, place, and time.      Sensory: No sensory deficit.      Motor: No weakness.      Coordination: Coordination normal.      Gait: Gait normal.      Deep Tendon Reflexes: Reflexes are normal and symmetric.   Psychiatric:         Mood and Affect: Mood normal. Mood is not anxious or depressed.         Behavior: Behavior  normal.         Thought Content: Thought content normal.         Judgment: Judgment normal.     Labs- reviewed  Imaging- reviewed     Assessment and Plan     Problem List Items Addressed This Visit       Malignant neoplasm of overlapping sites of left breast in female, estrogen receptor positive    History of ductal carcinoma in situ (DCIS) of breast    History of colon cancer     Other Visit Diagnoses       Malignant neoplasm of lower-inner quadrant of left breast in female, estrogen receptor positive    -  Primary    Relevant Medications    tamoxifen (NOLVADEX) 20 MG Tab            Breast cancer  Stage 1 reviewed characteristics and discussed adjuvant endocrine therapy  Chemotherapy not indicated  Reviewed SERM versus aromatase inhibitors- opts for Tamoxifen post review of mechanism of action and potential side effects    She was hesitant to consider any adjuvant therapy but on further discussion wishes to see how tolerated  RTC 4 weels  Knows to call for any issues    Genetics Myriad done- with FH will message genetics team to see if needs to be extended     Route Chart for Scheduling    Med Onc Chart Routing      Follow up with physician    Follow up with VU 4 weeks. cbc, cmp prior   Infusion scheduling note    Injection scheduling note    Labs    Imaging    Pharmacy appointment    Other referrals       1 hour direct with patient completing H+P and discussion  20 additional minutes chart review

## 2023-05-19 ENCOUNTER — PES CALL (OUTPATIENT)
Dept: ADMINISTRATIVE | Facility: CLINIC | Age: 78
End: 2023-05-19
Payer: MEDICARE

## 2023-05-29 ENCOUNTER — TELEPHONE (OUTPATIENT)
Dept: FAMILY MEDICINE | Facility: CLINIC | Age: 78
End: 2023-05-29
Payer: MEDICARE

## 2023-06-02 ENCOUNTER — TELEPHONE (OUTPATIENT)
Dept: HEMATOLOGY/ONCOLOGY | Facility: CLINIC | Age: 78
End: 2023-06-02
Payer: MEDICARE

## 2023-06-02 NOTE — TELEPHONE ENCOUNTER
Spoke to pt   Encouraged to keep appt with prior lab   Pt needs morning appts rather than afternoon  Rescheduled with morning appts and with PJ in Dr Flores's absence/booked day instead of with Clemencia

## 2023-06-15 ENCOUNTER — LAB VISIT (OUTPATIENT)
Dept: LAB | Facility: HOSPITAL | Age: 78
End: 2023-06-15
Attending: INTERNAL MEDICINE
Payer: MEDICARE

## 2023-06-15 DIAGNOSIS — E78.5 DYSLIPIDEMIA ASSOCIATED WITH TYPE 2 DIABETES MELLITUS: ICD-10-CM

## 2023-06-15 DIAGNOSIS — E11.9 DIET-CONTROLLED DIABETES MELLITUS: ICD-10-CM

## 2023-06-15 DIAGNOSIS — E11.9 CONTROLLED TYPE 2 DIABETES MELLITUS WITHOUT COMPLICATION, WITHOUT LONG-TERM CURRENT USE OF INSULIN: ICD-10-CM

## 2023-06-15 DIAGNOSIS — E11.69 DYSLIPIDEMIA ASSOCIATED WITH TYPE 2 DIABETES MELLITUS: ICD-10-CM

## 2023-06-15 LAB
ALBUMIN/CREAT UR: 8.7 UG/MG (ref 0–30)
CHOLEST SERPL-MCNC: 143 MG/DL (ref 120–199)
CHOLEST/HDLC SERPL: 2.7 {RATIO} (ref 2–5)
CREAT UR-MCNC: 69 MG/DL (ref 15–325)
ESTIMATED AVG GLUCOSE: 157 MG/DL (ref 68–131)
HBA1C MFR BLD: 7.1 % (ref 4–5.6)
HDLC SERPL-MCNC: 53 MG/DL (ref 40–75)
HDLC SERPL: 37.1 % (ref 20–50)
LDLC SERPL CALC-MCNC: 71.2 MG/DL (ref 63–159)
MICROALBUMIN UR DL<=1MG/L-MCNC: 6 UG/ML
NONHDLC SERPL-MCNC: 90 MG/DL
TRIGL SERPL-MCNC: 94 MG/DL (ref 30–150)

## 2023-06-15 PROCEDURE — 80061 LIPID PANEL: CPT | Performed by: INTERNAL MEDICINE

## 2023-06-15 PROCEDURE — 83036 HEMOGLOBIN GLYCOSYLATED A1C: CPT | Performed by: INTERNAL MEDICINE

## 2023-06-15 PROCEDURE — 36415 COLL VENOUS BLD VENIPUNCTURE: CPT | Mod: PO | Performed by: INTERNAL MEDICINE

## 2023-06-15 PROCEDURE — 82570 ASSAY OF URINE CREATININE: CPT | Performed by: INTERNAL MEDICINE

## 2023-06-21 ENCOUNTER — OFFICE VISIT (OUTPATIENT)
Dept: DERMATOLOGY | Facility: CLINIC | Age: 78
End: 2023-06-21
Payer: MEDICARE

## 2023-06-21 DIAGNOSIS — L91.8 ST (SKIN TAG): ICD-10-CM

## 2023-06-21 DIAGNOSIS — L72.0 MILIA: ICD-10-CM

## 2023-06-21 DIAGNOSIS — L82.1 SK (SEBORRHEIC KERATOSIS): ICD-10-CM

## 2023-06-21 DIAGNOSIS — L71.9 ROSACEA: ICD-10-CM

## 2023-06-21 DIAGNOSIS — Z12.83 SKIN CANCER SCREENING: Primary | ICD-10-CM

## 2023-06-21 DIAGNOSIS — D18.01 CHERRY ANGIOMA: ICD-10-CM

## 2023-06-21 DIAGNOSIS — L57.0 AK (ACTINIC KERATOSIS): ICD-10-CM

## 2023-06-21 DIAGNOSIS — D22.9 MULTIPLE BENIGN NEVI: ICD-10-CM

## 2023-06-21 PROCEDURE — 17000 PR DESTRUCTION(LASER SURGERY,CRYOSURGERY,CHEMOSURGERY),PREMALIGNANT LESIONS,FIRST LESION: ICD-10-PCS | Mod: S$GLB,,, | Performed by: DERMATOLOGY

## 2023-06-21 PROCEDURE — 99999 PR PBB SHADOW E&M-EST. PATIENT-LVL III: ICD-10-PCS | Mod: PBBFAC,,, | Performed by: DERMATOLOGY

## 2023-06-21 PROCEDURE — 1126F PR PAIN SEVERITY QUANTIFIED, NO PAIN PRESENT: ICD-10-PCS | Mod: CPTII,S$GLB,, | Performed by: DERMATOLOGY

## 2023-06-21 PROCEDURE — 3288F PR FALLS RISK ASSESSMENT DOCUMENTED: ICD-10-PCS | Mod: CPTII,S$GLB,, | Performed by: DERMATOLOGY

## 2023-06-21 PROCEDURE — 99203 PR OFFICE/OUTPT VISIT, NEW, LEVL III, 30-44 MIN: ICD-10-PCS | Mod: 25,S$GLB,, | Performed by: DERMATOLOGY

## 2023-06-21 PROCEDURE — 99999 PR PBB SHADOW E&M-EST. PATIENT-LVL III: CPT | Mod: PBBFAC,,, | Performed by: DERMATOLOGY

## 2023-06-21 PROCEDURE — 1101F PR PT FALLS ASSESS DOC 0-1 FALLS W/OUT INJ PAST YR: ICD-10-PCS | Mod: CPTII,S$GLB,, | Performed by: DERMATOLOGY

## 2023-06-21 PROCEDURE — 17003 DESTRUCTION, PREMALIGNANT LESIONS; SECOND THROUGH 14 LESIONS: ICD-10-PCS | Mod: S$GLB,,, | Performed by: DERMATOLOGY

## 2023-06-21 PROCEDURE — 1126F AMNT PAIN NOTED NONE PRSNT: CPT | Mod: CPTII,S$GLB,, | Performed by: DERMATOLOGY

## 2023-06-21 PROCEDURE — 3288F FALL RISK ASSESSMENT DOCD: CPT | Mod: CPTII,S$GLB,, | Performed by: DERMATOLOGY

## 2023-06-21 PROCEDURE — 1101F PT FALLS ASSESS-DOCD LE1/YR: CPT | Mod: CPTII,S$GLB,, | Performed by: DERMATOLOGY

## 2023-06-21 PROCEDURE — 3072F PR LOW RISK FOR RETINOPATHY: ICD-10-PCS | Mod: CPTII,S$GLB,, | Performed by: DERMATOLOGY

## 2023-06-21 PROCEDURE — 17003 DESTRUCT PREMALG LES 2-14: CPT | Mod: S$GLB,,, | Performed by: DERMATOLOGY

## 2023-06-21 PROCEDURE — 99203 OFFICE O/P NEW LOW 30 MIN: CPT | Mod: 25,S$GLB,, | Performed by: DERMATOLOGY

## 2023-06-21 PROCEDURE — 3072F LOW RISK FOR RETINOPATHY: CPT | Mod: CPTII,S$GLB,, | Performed by: DERMATOLOGY

## 2023-06-21 PROCEDURE — 17000 DESTRUCT PREMALG LESION: CPT | Mod: S$GLB,,, | Performed by: DERMATOLOGY

## 2023-06-21 NOTE — PROGRESS NOTES
Subjective:      Patient ID:  Winnie Ngo is a 78 y.o. female who presents for   Chief Complaint   Patient presents with    Skin Check     TBSE      Pt is a 77 y/o female presenting to the clinic for a TBSE.  Pt has concerns about moles/growth in her groin area.    Lesions were recently noted. Asymptomatic and no prior treatment.    Review of Systems   Constitutional:  Negative for chills.   Skin:  Negative for daily sunscreen use, recent sunburn and wears hat.   Hematologic/Lymphatic: Bruises/bleeds easily.     Objective:   Physical Exam   Constitutional: She appears well-developed and well-nourished. No distress.   Genitourinary:         Neurological: She is alert and oriented to person, place, and time. She is not disoriented.   Psychiatric: She has a normal mood and affect.   Skin:   Areas Examined (abnormalities noted in diagram):   Scalp / Hair Palpated and Inspected  Head / Face Inspection Performed  Neck Inspection Performed  Chest / Axilla Inspection Performed  Abdomen Inspection Performed  Genitals / Buttocks / Groin Inspection Performed  Back Inspection Performed  RUE Inspected  LUE Inspection Performed  RLE Inspected  LLE Inspection Performed  Nails and Digits Inspection Performed                   Diagram Legend     Erythematous scaling macule/papule c/w actinic keratosis       Vascular papule c/w angioma      Pigmented verrucoid papule/plaque c/w seborrheic keratosis      Yellow umbilicated papule c/w sebaceous hyperplasia      Irregularly shaped tan macule c/w lentigo     1-2 mm smooth white papules consistent with Milia      Movable subcutaneous cyst with punctum c/w epidermal inclusion cyst      Subcutaneous movable cyst c/w pilar cyst      Firm pink to brown papule c/w dermatofibroma      Pedunculated fleshy papule(s) c/w skin tag(s)      Evenly pigmented macule c/w junctional nevus     Mildly variegated pigmented, slightly irregular-bordered macule c/w mildly atypical nevus      Flesh  colored to evenly pigmented papule c/w intradermal nevus       Pink pearly papule/plaque c/w basal cell carcinoma      Erythematous hyperkeratotic cursted plaque c/w SCC      Surgical scar with no sign of skin cancer recurrence      Open and closed comedones      Inflammatory papules and pustules      Verrucoid papule consistent consistent with wart     Erythematous eczematous patches and plaques     Dystrophic onycholytic nail with subungual debris c/w onychomycosis     Umbilicated papule    Erythematous-base heme-crusted tan verrucoid plaque consistent with inflamed seborrheic keratosis     Erythematous Silvery Scaling Plaque c/w Psoriasis     See annotation      Assessment / Plan:        Skin cancer screening  Total body skin examination performed today including at least 12 points as noted in physical examination. No lesions suspicious for malignancy noted.  Patient instructed in importance of daily broad spectrum sunscreen use with spf at least 30. Sun avoidance and topical protection/protective clothing discussed.    SK (seborrheic keratosis)  These are benign inherited growths without a malignant potential. Reassurance given to patient. No treatment is necessary.   Treatment of benign, asymptomatic lesions may be considered cosmetic.  Warned about risk of hypo- or hyperpigmentation with treatment and risk of recurrence.    Multiple benign nevi  Benign-appearing nevi present on exam today. Reassurance provided. Periodically examine moles and return to clinic if any moles change or become symptomatic (bleeding, itching, pain, etc).    Rosacea  Patient instructed in importance of daily broad spectrum sunscreen use with spf at least 30. Sun avoidance and topical protection/protective clothing discussed.  Not interested in tx currently.    ST (skin tag)  Reassurance given to patient. No treatment is necessary.   Treatment of benign, asymptomatic lesions may be considered cosmetic.    Cherry angioma  This is a  benign vascular lesion. Reassurance given. No treatment required. Treatment of benign, asymptomatic lesions may be considered cosmetic.    AK (actinic keratosis)  Cryosurgery Procedure Note    Verbal consent from the patient is obtained including, but not limited to, risk of hypopigmentation/hyperpigmentation, scar, recurrence of lesion. The patient is aware of the precancerous quality and need for treatment of these lesions. Liquid nitrogen cryosurgery is applied to the 2 actinic keratoses, as detailed in the physical exam, to produce a freeze injury. The patient is aware that blisters may form and is instructed on wound care with gentle cleansing and use of vaseline ointment to keep moist until healed. The patient is supplied a handout on cryosurgery and is instructed to call if lesions do not completely resolve.    Milia  This is a benign cyst. Reassurance provided. No treatment is necessary unless it is symptomatic. These may resolve on their own.    Follow up in about 1 year (around 6/21/2024) for skin check or sooner for any concerns.

## 2023-06-21 NOTE — PATIENT INSTRUCTIONS
Sun Protection      The [facility] would like to remind you of the importance of sun protection all year round and particularly during the summer when the suns rays are the strongest. It has been proven that both acute and chronic sun exposure damages our cells and leads to skin cancer. Beyond skin cancer, the sun causes 90% of the symptoms of premature skin aging, including wrinkles, lentigines (brown spots), and thin, easily bruised skin. Proper sun protection can help prevent these unwanted conditions.    Many patients report that they dont go in the sun. It has been shown that the average person receives 18 hours of incidental sun exposure per week during activities such as walking through parking lots, driving, or sitting next to windows. This accumulates to several bad sunburns per year!    In choosing sunscreen, you want one that protects against both UVA and UVB rays (broad spectrum). It is recommended that you use one of SPF 30 or higher. It is important to apply the sunscreen about 20 minutes prior to sun exposure. Most sunscreens are chemical sunscreens and a reaction must take place in the skin so that they are effective. If they are applied and then you are immediately exposed to the sun or start sweating, this reaction has not had time to take place and you are therefore unprotected. Sunscreen needs to be reapplied every 2 hours if you are participating in water sports or sweating. We recommend Elta MD or CeraVe sunscreens for daily use; however there are many options and it is most important for you to find one that you will use on a consistent basis.    If you have sensitive skin, you may do best with a sunscreen that contains only physical blockers in the active ingredient section. The only physical blockers available in the [place] currently are titanium dioxide or zinc oxide. These are typically thicker and harder to apply, however they afford very good protection.  Neutrogena Sensitive Skin, Blue Lizard Sensitive Skin (pink top) or Neutrogena Pure and Free are popular ones.     Aside from sunscreen, clothes with UV protection (UPF), wide brimmed hats, and sunglasses are other means of sun protection that we recommend.      Based on a recent study (6/2021) and out of an abundance of caution, we are recommending that you AVOID the following sunscreens as they may contain the carcinogen, benzene:    Spray and gel sunscreens  Any [employer] or [employer] brands as well as Max Block and TopCare brands   Neutrogena Ultra Sheer Dry-touch Water Resistant Sunscreen LOTION SPF 70   Neutrogena Sheer Zinc Dry-touch Face Sunscreen LOTION SPF 50   5.   Aveeno Baby Continuous Protection Sensitive Skin Sunscreen LOTION - Broad Spectrum SPF 50    Please note that Benzene is not an ingredient or the degradation product of any ingredient in any sunscreen. This study suggested that the findings are a result of contamination in the manufacturing process. At this point, we don't know how effectively Benzene gets through the skin, if it gets absorbed systemically, and what effects it may have.     We do know that ultraviolet radiation is a well-established carcinogen. Please use daily sun protection/avoidance and use of at least SPF 30, broad-spectrum sunscreen not listed above.                       [facility] - DERMATOLOGY [address]  [address]  [place] [address]  Dept: [phone]  Dept Fax: [phone]     CRYOSURGERY      Your doctor has used a method called cryosurgery to treat your skin condition. Cryosurgery refers to the use of very cold substances to treat a variety of skin conditions such as warts, pre-skin cancers, molluscum contagiosum, sun spots, and several benign growths. The substance we use in cryosurgery is liquid nitrogen and is so cold (-195 degrees Celsius) that is burns when administered.     Following treatment in the office, the skin may  immediately burn and become red. You may find the area around the lesion is affected as well. It is sometimes necessary to treat not only the lesion, but a small area of the surrounding normal skin to achieve a good response.     A blister, and even a blood filled blister, may form after treatment.   This is a normal response. If the blister is painful, it is acceptable to sterilize a needle and with rubbing alcohol and gently pop the blister. It is important that you gently wash the area with soap and warm water as the blister fluid may contain wart virus if a wart was treated. Do no remove the roof of the blister.     The area treated can take anywhere from 1-3 weeks to heal. Healing time depends on the kind skin lesion treated, the location, and how aggressively the lesion was treated. It is recommended that the areas treated are covered with Vaseline or bacitracin ointment and a band-aid. If a band-aid is not practical, just ointment applied several times per day will do. Keeping these areas moist will speed the healing time.    Treatment with liquid nitrogen can leave a scar. In dark skin, it may be a light or dark scar, in light skin it may be a white or pink scar. These will generally fade with time, but may never go away completely.     If you have any concerns after your treatment, please feel free to call the office.       [facility] [place] [address]/ [phone] FAX/ [email or web address]

## 2023-06-29 ENCOUNTER — OFFICE VISIT (OUTPATIENT)
Dept: FAMILY MEDICINE | Facility: CLINIC | Age: 78
End: 2023-06-29
Payer: MEDICARE

## 2023-06-29 VITALS
HEIGHT: 62 IN | WEIGHT: 155.88 LBS | BODY MASS INDEX: 28.69 KG/M2 | OXYGEN SATURATION: 95 % | SYSTOLIC BLOOD PRESSURE: 120 MMHG | HEART RATE: 72 BPM | DIASTOLIC BLOOD PRESSURE: 60 MMHG

## 2023-06-29 DIAGNOSIS — E11.9 DIET-CONTROLLED DIABETES MELLITUS: ICD-10-CM

## 2023-06-29 DIAGNOSIS — Z00.00 ENCOUNTER FOR PREVENTIVE HEALTH EXAMINATION: Primary | ICD-10-CM

## 2023-06-29 DIAGNOSIS — F41.0 PANIC DISORDER (EPISODIC PAROXYSMAL ANXIETY): ICD-10-CM

## 2023-06-29 DIAGNOSIS — C50.812 MALIGNANT NEOPLASM OF OVERLAPPING SITES OF LEFT BREAST IN FEMALE, ESTROGEN RECEPTOR POSITIVE: ICD-10-CM

## 2023-06-29 DIAGNOSIS — J45.30 MILD PERSISTENT ASTHMA, UNSPECIFIED WHETHER COMPLICATED: ICD-10-CM

## 2023-06-29 DIAGNOSIS — Z17.0 MALIGNANT NEOPLASM OF OVERLAPPING SITES OF LEFT BREAST IN FEMALE, ESTROGEN RECEPTOR POSITIVE: ICD-10-CM

## 2023-06-29 DIAGNOSIS — F13.20 BENZODIAZEPINE DEPENDENCE: ICD-10-CM

## 2023-06-29 PROCEDURE — 3072F PR LOW RISK FOR RETINOPATHY: ICD-10-PCS | Mod: CPTII,S$GLB,,

## 2023-06-29 PROCEDURE — 3078F DIAST BP <80 MM HG: CPT | Mod: CPTII,S$GLB,,

## 2023-06-29 PROCEDURE — 1159F MED LIST DOCD IN RCRD: CPT | Mod: CPTII,S$GLB,,

## 2023-06-29 PROCEDURE — 3288F PR FALLS RISK ASSESSMENT DOCUMENTED: ICD-10-PCS | Mod: CPTII,S$GLB,,

## 2023-06-29 PROCEDURE — 1126F AMNT PAIN NOTED NONE PRSNT: CPT | Mod: CPTII,S$GLB,,

## 2023-06-29 PROCEDURE — 99999 PR PBB SHADOW E&M-EST. PATIENT-LVL IV: CPT | Mod: PBBFAC,,,

## 2023-06-29 PROCEDURE — 1101F PT FALLS ASSESS-DOCD LE1/YR: CPT | Mod: CPTII,S$GLB,,

## 2023-06-29 PROCEDURE — 1126F PR PAIN SEVERITY QUANTIFIED, NO PAIN PRESENT: ICD-10-PCS | Mod: CPTII,S$GLB,,

## 2023-06-29 PROCEDURE — 3074F PR MOST RECENT SYSTOLIC BLOOD PRESSURE < 130 MM HG: ICD-10-PCS | Mod: CPTII,S$GLB,,

## 2023-06-29 PROCEDURE — G0439 PPPS, SUBSEQ VISIT: HCPCS | Mod: S$GLB,,,

## 2023-06-29 PROCEDURE — 3072F LOW RISK FOR RETINOPATHY: CPT | Mod: CPTII,S$GLB,,

## 2023-06-29 PROCEDURE — 3288F FALL RISK ASSESSMENT DOCD: CPT | Mod: CPTII,S$GLB,,

## 2023-06-29 PROCEDURE — 1170F PR FUNCTIONAL STATUS ASSESSED: ICD-10-PCS | Mod: CPTII,S$GLB,,

## 2023-06-29 PROCEDURE — 3074F SYST BP LT 130 MM HG: CPT | Mod: CPTII,S$GLB,,

## 2023-06-29 PROCEDURE — 1160F PR REVIEW ALL MEDS BY PRESCRIBER/CLIN PHARMACIST DOCUMENTED: ICD-10-PCS | Mod: CPTII,S$GLB,,

## 2023-06-29 PROCEDURE — 1170F FXNL STATUS ASSESSED: CPT | Mod: CPTII,S$GLB,,

## 2023-06-29 PROCEDURE — 3078F PR MOST RECENT DIASTOLIC BLOOD PRESSURE < 80 MM HG: ICD-10-PCS | Mod: CPTII,S$GLB,,

## 2023-06-29 PROCEDURE — G0439 PR MEDICARE ANNUAL WELLNESS SUBSEQUENT VISIT: ICD-10-PCS | Mod: S$GLB,,,

## 2023-06-29 PROCEDURE — 1101F PR PT FALLS ASSESS DOC 0-1 FALLS W/OUT INJ PAST YR: ICD-10-PCS | Mod: CPTII,S$GLB,,

## 2023-06-29 PROCEDURE — 99999 PR PBB SHADOW E&M-EST. PATIENT-LVL IV: ICD-10-PCS | Mod: PBBFAC,,,

## 2023-06-29 PROCEDURE — 1159F PR MEDICATION LIST DOCUMENTED IN MEDICAL RECORD: ICD-10-PCS | Mod: CPTII,S$GLB,,

## 2023-06-29 PROCEDURE — 1160F RVW MEDS BY RX/DR IN RCRD: CPT | Mod: CPTII,S$GLB,,

## 2023-06-29 NOTE — PROGRESS NOTES
Winnie Ngo presented for a  Medicare AWV and comprehensive Health Risk Assessment today. The following components were reviewed and updated:    Medical history  Family History  Social history  Allergies and Current Medications  Health Risk Assessment  Health Maintenance  Care Team         ** See Completed Assessments for Annual Wellness Visit within the encounter summary.**         The following assessments were completed:  Living Situation  CAGE  Depression Screening  Timed Get Up and Go  Whisper Test  Cognitive Function Screening      Nutrition Screening  ADL Screening  PAQ Screening      Review for Opioid Screening: Pt does not have Rx for Opioids      Review for Substance Use Disorders: Patient does use substance  - Benzo      Current Outpatient Medications:     albuterol (PROVENTIL/VENTOLIN HFA) 90 mcg/actuation inhaler, Inhale 2 puffs into the lungs every 6 (six) hours as needed for Wheezing. May substitute any albuterol inhaler on her plan., Disp: 18 g, Rfl: 3    atorvastatin (LIPITOR) 10 MG tablet, Take 1 tablet (10 mg total) by mouth every evening., Disp: 90 tablet, Rfl: 1    cholecalciferol, vitamin D3, (VITAMIN D3) 25 mcg (1,000 unit) capsule, Take 4,000 Units by mouth once daily. Per Dr. Flores, Disp: , Rfl:     fluticasone-salmeterol diskus inhaler 100-50 mcg, Inhale 1 puff into the lungs 2 (two) times daily., Disp: 60 each, Rfl: 6    LORazepam (ATIVAN) 1 MG tablet, TAKE 1 TABLET(1 MG) BY MOUTH EVERY NIGHT AS NEEDED FOR ANXIETY, Disp: 30 tablet, Rfl: 3    melatonin 1 mg Tab, Take 1 tablet by mouth every evening., Disp: , Rfl:     pantoprazole (PROTONIX) 20 MG tablet, Take 1 tablet (20 mg total) by mouth once daily., Disp: 90 tablet, Rfl: 3    tamoxifen (NOLVADEX) 20 MG Tab, Take 1 tablet (20 mg total) by mouth once daily., Disp: 30 tablet, Rfl: 1    blood sugar diagnostic (BLOOD GLUCOSE TEST) Strp, One test strip per glucose testing twice a day: Insurance Brand Preference, Disp: 180 strip, Rfl: 3    " blood-glucose meter Misc, Dispense one meter: Insurance Brand Preference, Disp: 1 each, Rfl: 0    lancets Misc, 1 each by Misc.(Non-Drug; Combo Route) route once daily., Disp: 100 each, Rfl: 11       Vitals:    06/29/23 0859   BP: 120/60   Pulse: 72   SpO2: 95%   Weight: 70.7 kg (155 lb 13.8 oz)   Height: 5' 2" (1.575 m)   PainSc: 0-No pain      Physical Exam  Vitals reviewed.   Constitutional:       General: She is not in acute distress.     Appearance: Normal appearance. She is well-developed and well-groomed. She is not ill-appearing or toxic-appearing.   Eyes:      Pupils: Pupils are equal, round, and reactive to light.   Neck:      Vascular: No carotid bruit.   Cardiovascular:      Rate and Rhythm: Normal rate.      Pulses: Normal pulses.   Pulmonary:      Effort: Pulmonary effort is normal. No respiratory distress.      Breath sounds: Normal breath sounds.   Abdominal:      General: Bowel sounds are normal.   Musculoskeletal:      Cervical back: Normal range of motion.      Right lower leg: No edema.      Left lower leg: No edema.   Skin:     General: Skin is warm and dry.   Neurological:      General: No focal deficit present.      Mental Status: She is alert and oriented to person, place, and time.   Psychiatric:         Attention and Perception: Attention normal.         Mood and Affect: Mood normal.         Speech: Speech normal.         Behavior: Behavior is cooperative.           Diagnoses and health risks identified today and associated recommendations/orders:    1. Encounter for preventive health examination      2. Benzodiazepine dependence  Chronic; stable. Reviewed potential medication use disorder risk factors. Patient instructed to follow up with PCP and/or Pain Medicine.     3. Diet-controlled diabetes mellitus  Chronic; stable on diet. Followed by PCP.     4. Malignant neoplasm of overlapping sites of left breast in female, estrogen receptor positive  Chronic; stable on medication. Followed by " Breast surgery and hem/onc.     5. Panic disorder (episodic paroxysmal anxiety)  Chronic; stable on medications. Followed by PCP.     6. Mild persistent asthma, unspecified whether complicated  Chronic; stable on medications. Followed by PCP.       Provided Winnie with a 5-10 year written screening schedule and personal prevention plan. Recommendations were developed using the USPSTF age appropriate recommendations. Education, counseling, and referrals were provided as needed. After Visit Summary printed and given to patient which includes a list of additional screenings\tests needed.    Follow up in about 1 year (around 6/29/2024).    Jessi Smart, NP    Advance Care Planning   I offered to discuss advanced care planning, including how to pick a person who would make decisions for you if you were unable to make them for yourself, called a health care power of , and what kind of decisions you might make such as use of life sustaining treatments such as ventilators and tube feeding when faced with a life limiting illness recorded on a living will that they will need to know. (How you want to be cared for as you near the end of your natural life)     X Patient is interested in learning more about how to make advanced directives.  I provided them paperwork and offered to discuss this with them.

## 2023-06-29 NOTE — PATIENT INSTRUCTIONS
Counseling and Referral of Other Preventative  (Italic type indicates deductible and co-insurance are waived)    Patient Name: Winnie Ngo  Today's Date: 6/29/2023    Health Maintenance       Date Due Completion Date    TETANUS VACCINE 06/04/2008 6/4/1998    COVID-19 Vaccine (5 - Moderna series) 02/14/2023 10/14/2022    Eye Exam 02/18/2023 2/18/2022    Hemoglobin A1c 12/15/2023 6/15/2023    Mammogram 02/16/2024 2/16/2023    Override on 9/14/2011: (N/S)    Diabetes Urine Screening 06/15/2024 6/15/2023    Lipid Panel 06/15/2024 6/15/2023    DEXA Scan 01/26/2025 1/26/2022    Override on 5/11/2011: (N/S)        No orders of the defined types were placed in this encounter.    The following information is provided to all patients.  This information is to help you find resources for any of the problems found today that may be affecting your health:                Living healthy guide: www.[facility].[place].gov      Understanding Diabetes: www.diabetes.org      Eating healthy: www.cdc.gov/healthyweight      CDC home safety checklist: www.cdc.gov/steadi/patient.html      Agency on Aging: www.goea.[place].gov      Alcoholics anonymous (AA): www.aa.org      Physical Activity: www.jeannine.nih.gov/fp8tryl      Tobacco use: www.quitwithusla.org

## 2023-07-12 DIAGNOSIS — F41.9 ANXIETY: ICD-10-CM

## 2023-07-12 RX ORDER — LORAZEPAM 1 MG/1
TABLET ORAL
Qty: 30 TABLET | Refills: 0 | Status: SHIPPED | OUTPATIENT
Start: 2023-07-12 | End: 2023-08-17

## 2023-07-12 NOTE — TELEPHONE ENCOUNTER
No care due was identified.  Health [facility] Embedded Care Due Messages. Reference number: [id number].   7/12/2023 9:57:02 AM CDT

## 2023-07-25 DIAGNOSIS — J45.30 MILD PERSISTENT ASTHMA WITHOUT COMPLICATION: ICD-10-CM

## 2023-07-25 RX ORDER — CEPHALEXIN 250 MG/1
CAPSULE ORAL
Qty: 60 EACH | Refills: 3 | Status: SHIPPED | OUTPATIENT
Start: 2023-07-25

## 2023-07-25 NOTE — TELEPHONE ENCOUNTER
No care due was identified.  Health [facility] Embedded Care Due Messages. Reference number: [id number].   7/25/2023 9:41:20 AM CDT

## 2023-07-25 NOTE — TELEPHONE ENCOUNTER
Refill Decision Note   Winnie Ngo  is requesting a refill authorization.  Brief Assessment and Rationale for Refill:  Approve     Medication Therapy Plan:       Medication Reconciliation Completed: No   Comments:     No Care Gaps recommended.     Note composed:9:55 AM 07/25/2023

## 2023-08-08 DIAGNOSIS — Z17.0 MALIGNANT NEOPLASM OF LOWER-INNER QUADRANT OF LEFT BREAST IN FEMALE, ESTROGEN RECEPTOR POSITIVE: ICD-10-CM

## 2023-08-08 DIAGNOSIS — C50.312 MALIGNANT NEOPLASM OF LOWER-INNER QUADRANT OF LEFT BREAST IN FEMALE, ESTROGEN RECEPTOR POSITIVE: ICD-10-CM

## 2023-08-08 RX ORDER — TAMOXIFEN CITRATE 20 MG/1
20 TABLET ORAL DAILY
Qty: 30 TABLET | Refills: 1 | Status: SHIPPED | OUTPATIENT
Start: 2023-08-08 | End: 2024-08-07

## 2023-08-09 DIAGNOSIS — E78.5 HYPERLIPIDEMIA, UNSPECIFIED HYPERLIPIDEMIA TYPE: ICD-10-CM

## 2023-08-09 RX ORDER — ATORVASTATIN CALCIUM 10 MG/1
10 TABLET, FILM COATED ORAL NIGHTLY
Qty: 90 TABLET | Refills: 1 | Status: SHIPPED | OUTPATIENT
Start: 2023-08-09 | End: 2024-02-16

## 2023-08-09 NOTE — TELEPHONE ENCOUNTER
No care due was identified.  [facility] Embedded Care Due Messages. Reference number: [id number].   8/09/2023 5:16:18 PM CDT

## 2023-08-10 NOTE — TELEPHONE ENCOUNTER
Refill Decision Note   Winnie Ngo  is requesting a refill authorization.  Brief Assessment and Rationale for Refill:  Approve     Medication Therapy Plan:         Comments:     Note composed:9:30 PM 08/09/2023

## 2023-08-17 DIAGNOSIS — F41.9 ANXIETY: ICD-10-CM

## 2023-08-17 RX ORDER — LORAZEPAM 1 MG/1
TABLET ORAL
Qty: 30 TABLET | Refills: 1 | Status: SHIPPED | OUTPATIENT
Start: 2023-08-17 | End: 2023-11-01

## 2023-08-17 NOTE — TELEPHONE ENCOUNTER
No care due was identified.  [facility] Embedded Care Due Messages. Reference number: [id number].   8/17/2023 9:27:39 AM CDT

## 2023-09-12 ENCOUNTER — TELEPHONE (OUTPATIENT)
Dept: OBSTETRICS AND GYNECOLOGY | Facility: CLINIC | Age: 78
End: 2023-09-12
Payer: MEDICARE

## 2023-09-12 NOTE — TELEPHONE ENCOUNTER
Spoke to patient. Reports some irritation and pain around vulva and anus. As well as possible growth on vulva. Would like to be seen as soon as possible.  Patient scheduled for visit on 09/14 @ 1300. All questions answered and patient verbalized understanding.

## 2023-09-12 NOTE — TELEPHONE ENCOUNTER
----- Message from Berta Garcia sent at 9/11/2023  1:26 PM CDT -----  Type:  Needs Medical Advice    Who Called: pt  Symptoms (please be specific): pt has been waiting since Wednesday for a call back that really needs to addressed please call to discuss to schedule    Would the patient rather a call back or a response via [facility]? call  Best Call Back Number: [phone]  Additional Information:

## 2023-09-14 ENCOUNTER — OFFICE VISIT (OUTPATIENT)
Dept: OBSTETRICS AND GYNECOLOGY | Facility: CLINIC | Age: 78
End: 2023-09-14
Payer: MEDICARE

## 2023-09-14 VITALS
DIASTOLIC BLOOD PRESSURE: 56 MMHG | SYSTOLIC BLOOD PRESSURE: 132 MMHG | BODY MASS INDEX: 28.03 KG/M2 | WEIGHT: 152.31 LBS | HEIGHT: 62 IN

## 2023-09-14 DIAGNOSIS — R81 GLUCOSURIA: ICD-10-CM

## 2023-09-14 DIAGNOSIS — N89.8 VAGINAL IRRITATION: Primary | ICD-10-CM

## 2023-09-14 DIAGNOSIS — R30.0 DYSURIA: ICD-10-CM

## 2023-09-14 PROCEDURE — 81514 NFCT DS BV&VAGINITIS DNA ALG: CPT

## 2023-09-14 PROCEDURE — 3078F PR MOST RECENT DIASTOLIC BLOOD PRESSURE < 80 MM HG: ICD-10-PCS | Mod: CPTII,S$GLB,,

## 2023-09-14 PROCEDURE — 1101F PR PT FALLS ASSESS DOC 0-1 FALLS W/OUT INJ PAST YR: ICD-10-PCS | Mod: CPTII,S$GLB,,

## 2023-09-14 PROCEDURE — 1159F PR MEDICATION LIST DOCUMENTED IN MEDICAL RECORD: ICD-10-PCS | Mod: CPTII,S$GLB,,

## 2023-09-14 PROCEDURE — 99999 PR PBB SHADOW E&M-EST. PATIENT-LVL III: CPT | Mod: PBBFAC,,,

## 2023-09-14 PROCEDURE — 1101F PT FALLS ASSESS-DOCD LE1/YR: CPT | Mod: CPTII,S$GLB,,

## 2023-09-14 PROCEDURE — 99203 PR OFFICE/OUTPT VISIT, NEW, LEVL III, 30-44 MIN: ICD-10-PCS | Mod: S$GLB,,,

## 2023-09-14 PROCEDURE — 1159F MED LIST DOCD IN RCRD: CPT | Mod: CPTII,S$GLB,,

## 2023-09-14 PROCEDURE — 1125F AMNT PAIN NOTED PAIN PRSNT: CPT | Mod: CPTII,S$GLB,,

## 2023-09-14 PROCEDURE — 3072F LOW RISK FOR RETINOPATHY: CPT | Mod: CPTII,S$GLB,,

## 2023-09-14 PROCEDURE — 1125F PR PAIN SEVERITY QUANTIFIED, PAIN PRESENT: ICD-10-PCS | Mod: CPTII,S$GLB,,

## 2023-09-14 PROCEDURE — 3072F PR LOW RISK FOR RETINOPATHY: ICD-10-PCS | Mod: CPTII,S$GLB,,

## 2023-09-14 PROCEDURE — 3075F SYST BP GE 130 - 139MM HG: CPT | Mod: CPTII,S$GLB,,

## 2023-09-14 PROCEDURE — 3288F PR FALLS RISK ASSESSMENT DOCUMENTED: ICD-10-PCS | Mod: CPTII,S$GLB,,

## 2023-09-14 PROCEDURE — 1160F PR REVIEW ALL MEDS BY PRESCRIBER/CLIN PHARMACIST DOCUMENTED: ICD-10-PCS | Mod: CPTII,S$GLB,,

## 2023-09-14 PROCEDURE — 3078F DIAST BP <80 MM HG: CPT | Mod: CPTII,S$GLB,,

## 2023-09-14 PROCEDURE — 87086 URINE CULTURE/COLONY COUNT: CPT

## 2023-09-14 PROCEDURE — 3075F PR MOST RECENT SYSTOLIC BLOOD PRESS GE 130-139MM HG: ICD-10-PCS | Mod: CPTII,S$GLB,,

## 2023-09-14 PROCEDURE — 1160F RVW MEDS BY RX/DR IN RCRD: CPT | Mod: CPTII,S$GLB,,

## 2023-09-14 PROCEDURE — 99999 PR PBB SHADOW E&M-EST. PATIENT-LVL III: ICD-10-PCS | Mod: PBBFAC,,,

## 2023-09-14 PROCEDURE — 81001 URINALYSIS AUTO W/SCOPE: CPT

## 2023-09-14 PROCEDURE — 99203 OFFICE O/P NEW LOW 30 MIN: CPT | Mod: S$GLB,,,

## 2023-09-14 PROCEDURE — 3288F FALL RISK ASSESSMENT DOCD: CPT | Mod: CPTII,S$GLB,,

## 2023-09-14 RX ORDER — DOXYCYCLINE 100 MG/1
100 CAPSULE ORAL 2 TIMES DAILY
Qty: 10 CAPSULE | Refills: 0 | Status: SHIPPED | OUTPATIENT
Start: 2023-09-14 | End: 2023-09-19

## 2023-09-14 RX ORDER — FLUCONAZOLE 150 MG/1
150 TABLET ORAL DAILY
Qty: 2 TABLET | Refills: 1 | Status: SHIPPED | OUTPATIENT
Start: 2023-09-14 | End: 2023-11-08

## 2023-09-14 RX ORDER — CLOBETASOL PROPIONATE 0.5 MG/G
OINTMENT TOPICAL 2 TIMES DAILY
Qty: 15 G | Refills: 2 | Status: SHIPPED | OUTPATIENT
Start: 2023-09-14

## 2023-09-14 NOTE — PROGRESS NOTES
Winnie Ngo is a 78 y.o. female  presents with pain and irritation around vulva and anus x2 weeks. It is painful when she urinates and the urine touches her skin. She also noticed a small, painless bump on the top of the vulva. She complains of chronic yeast infections over the past summer but has never experienced the inflammation like this externally.     She has a history of diabetes, last A1C 3mo ago =7.1. She is not on diabetes medication. She self-manages with diet and exercise.     Past Medical History:   Diagnosis Date    Allergy     Anxiety     Asthma     Benign neoplasm of colon     Breast cancer     Cancer     colon    Cataract     Chronic instability of knee, unspecified knee     rt knee cap displaced    Colon cancer     COPD (chronic obstructive pulmonary disease)     Diabetes mellitus     Dry eye syndrome     Fever blister     GERD (gastroesophageal reflux disease)     Hyperlipidemia     Joint pain     Osteopenia     Personal history of malignant neoplasm of large intestine     PONV (postoperative nausea and vomiting)      Past Surgical History:   Procedure Laterality Date    APPENDECTOMY      AXILLARY NODE DISSECTION Left 3/22/2023    Procedure: LYMPHADENECTOMY, AXILLARY-left;  Surgeon: Yokasta Cabrera MD;  Location: [facility];  Service: General;  Laterality: Left;    BREAST BIOPSY Right     exc bx     BREAST BIOPSY Left 2016    lumpectomy    BREAST BIOPSY Right     BREAST LUMPECTOMY Left     CHOLECYSTECTOMY      COLON SURGERY      stage T1 N0 M0    EYE SURGERY      HYSTERECTOMY      INJECTION FOR SENTINEL NODE IDENTIFICATION Left 3/22/2023    Procedure: INJECTION, FOR SENTINEL NODE IDENTIFICATION-left;  Surgeon: Yokasta Cabrera MD;  Location: [facility] OR;  Service: General;  Laterality: Left;  INJECTION     KNEE SURGERY      arthroscopic    MASTECTOMY Left 3/22/2023    Procedure: MASTECTOMY-left;  Surgeon: Yokasta Cabrera MD;  Location: [facility];  Service: General;  Laterality: Left;   "3 hours    NASAL SEPTUM SURGERY      with removal of polyps    SENTINEL LYMPH NODE BIOPSY Left 3/22/2023    Procedure: BIOPSY, LYMPH NODE, SENTINEL-left;  Surgeon: Yokasta Cabrera MD;  Location: [facility];  Service: General;  Laterality: Left;    sigmoid colectomy      SINUS SURGERY      TOTAL ABDOMINAL HYSTERECTOMY W/ BILATERAL SALPINGOOPHORECTOMY      UPPER ENDOSCOPY W/ BANDING      Schatzki's ring dialated 2 sessile polyps in stomach     Social History     Tobacco Use    Smoking status: Never     Passive exposure: Never    Smokeless tobacco: Never   Substance Use Topics    Alcohol use: No    Drug use: No     OB History    Para Term  AB Living   2 2 2     2   SAB IAB Ectopic Multiple Live Births                  # Outcome Date GA Lbr Onel/2nd Weight Sex Delivery Anes PTL Lv   2 Term     F Vag-Spont      1 Term     M Vag-Spont          BP (!) 132/56   Ht 5' 2" (1.575 m)   Wt 69.1 kg (152 lb 5.4 oz)   BMI 27.86 kg/m²     ROS:  GENERAL: No fever, chills, fatigability or weight loss.  ABDOMEN: No abdominal pain. Denies nausea or vomiting. No diarrhea or constipation  URINARY: No frequency, dysuria, hematuria.  VULVOVAGINAL: See HPI.  NEUROLOGICAL: No headaches. No vision changes.    PHYSICAL EXAM:  APPEARANCE: Well nourished, well developed, in no acute distress.  AFFECT: WNL, alert and oriented x 3  SKIN: No acne or hirsutism  CHEST: Good respiratory effect  ABDOMEN: Soft.  No tenderness or masses  PELVIC: 1+ erythema and dryness to vulva, perineum, and skin surround anus. Normal hair distribution. No raised lesions appreciated. Normal urethral meatus.  Vagina moist and well rugated without lesions or discharge.  Cervix surgically absent.  EXTREMITIES: No edema.    ASSESSMENT and PLAN:  1. Vaginal irritation  Vaginosis Screen by DNA Probe    fluconazole (DIFLUCAN) 150 MG Tab    clobetasol 0.05% (TEMOVATE) 0.05 % Oint      2. Dysuria  Urinalysis    Urine culture    doxycycline (VIBRAMYCIN) 100 MG Cap "      3. Glucosuria          Discussed yeast vs. Lichen sclerosus vs atrophic vaginitis. Start Diflucan and topical clobetasol ointment BID.     Patient was counseled today on vaginitis prevention including :  a. avoiding feminine products such as deoderant soaps, body wash, bubble bath, douches, deoderant tampons or pads, feminine wipes, chronic pad use, etc.  b. avoiding other vulvovaginal irritants such as long hot baths, humidity, tight, synthetic clothing, chlorine and sitting around in wet bathing suits  c. wearing cotton underwear and no underwear to bed  d. taking showers instead of baths and use a hair dryer on cool setting afterwards to dry  e. shower immediately after exercise and change clothes    +Nitrites and +++glucose in urine dip. Patient is allergic to macrobid, penicillins, fluoroquinolones. Will try doxycycline today and update treatment pending culture results. Counseled to f/u with PCP for uncontrolled diabetes.    Patient counseled to rtc if symptoms do not improve in 1 week.    Patient confirms understanding of encounter and all medical questions answered.

## 2023-09-15 LAB
BACTERIA #/AREA URNS AUTO: ABNORMAL /HPF
BILIRUB UR QL STRIP: NEGATIVE
CLARITY UR REFRACT.AUTO: ABNORMAL
COLOR UR AUTO: YELLOW
GLUCOSE UR QL STRIP: ABNORMAL
HGB UR QL STRIP: NEGATIVE
KETONES UR QL STRIP: NEGATIVE
LEUKOCYTE ESTERASE UR QL STRIP: ABNORMAL
MICROSCOPIC COMMENT: ABNORMAL
NITRITE UR QL STRIP: POSITIVE
PH UR STRIP: 5 [PH] (ref 5–8)
PROT UR QL STRIP: NEGATIVE
RBC #/AREA URNS AUTO: 1 /HPF (ref 0–4)
SP GR UR STRIP: 1.01 (ref 1–1.03)
SQUAMOUS #/AREA URNS AUTO: 7 /HPF
URN SPEC COLLECT METH UR: ABNORMAL
WBC #/AREA URNS AUTO: 7 /HPF (ref 0–5)
YEAST UR QL AUTO: ABNORMAL

## 2023-09-17 LAB
BACTERIA UR CULT: NO GROWTH
BACTERIAL VAGINOSIS DNA: NEGATIVE
CANDIDA GLABRATA DNA: NEGATIVE
CANDIDA KRUSEI DNA: NEGATIVE
CANDIDA RRNA VAG QL PROBE: POSITIVE
T VAGINALIS RRNA GENITAL QL PROBE: NEGATIVE

## 2023-10-09 ENCOUNTER — TELEPHONE (OUTPATIENT)
Dept: OBSTETRICS AND GYNECOLOGY | Facility: CLINIC | Age: 78
End: 2023-10-09
Payer: MEDICARE

## 2023-10-09 NOTE — TELEPHONE ENCOUNTER
----- Message from Zoya Long sent at 10/9/2023 11:54 AM CDT -----  .Type:  Needs Medical Advice    Who Called: pt    Would the patient rather a call back or a response via [facility]? Call back  Best Call Back Number: [phone]  Additional Information:     Pt would like a call back concerning medication that was prescribed

## 2023-10-09 NOTE — TELEPHONE ENCOUNTER
----- Message from Caitlin Grajeda sent at 10/9/2023  4:39 PM CDT -----  Type:  Patient Returning Call    Who Called:pt  Who Left Message for Patient:bienvenido  Does the patient know what this is regarding?:medication   Would the patient rather a call back or a response via [facility]? call  Best Call Back Number:[phone]  Additional Information:

## 2023-10-10 ENCOUNTER — TELEPHONE (OUTPATIENT)
Dept: OBSTETRICS AND GYNECOLOGY | Facility: CLINIC | Age: 78
End: 2023-10-10
Payer: MEDICARE

## 2023-10-10 NOTE — TELEPHONE ENCOUNTER
----- Message from Rosanna Hamilton sent at 10/10/2023  2:23 PM CDT -----  Type:  Patient Returning Call    Who Called:pt  Who Left Message for Patient:Sari Mckeon  Does the patient know what this is regarding?:medication question  Would the patient rather a call back or a response via [facility]? call  Best Call Back Number:[phone]  Additional Information: fluconazole (DIFLUCAN) 150 MG Tab

## 2023-10-11 ENCOUNTER — TELEPHONE (OUTPATIENT)
Dept: OBSTETRICS AND GYNECOLOGY | Facility: CLINIC | Age: 78
End: 2023-10-11
Payer: MEDICARE

## 2023-10-11 RX ORDER — CLOTRIMAZOLE 1 %
CREAM (GRAM) TOPICAL 2 TIMES DAILY
Qty: 12 G | Refills: 1 | Status: SHIPPED | OUTPATIENT
Start: 2023-10-11

## 2023-10-11 NOTE — TELEPHONE ENCOUNTER
Pt is calling because a nurse friend of hers told her that she can not take Diflucan due to being on Lipitor.     Pt wants to know what she should do

## 2023-10-11 NOTE — TELEPHONE ENCOUNTER
----- Message from Sallie Sánchez sent at 10/11/2023  1:58 PM CDT -----  Contact: pt  Type:  Patient Returning Call    Who Called:pt  Who Left Message for Patient:Sari   Does the patient know what this is regarding?:yes  Would the patient rather a call back or a response via [facility]? Call   Best Call Back Number:[phone]  Additional Information:

## 2023-10-24 ENCOUNTER — CLINICAL SUPPORT (OUTPATIENT)
Dept: FAMILY MEDICINE | Facility: CLINIC | Age: 78
End: 2023-10-24
Payer: MEDICARE

## 2023-10-24 DIAGNOSIS — Z23 NEED FOR VACCINATION: Primary | ICD-10-CM

## 2023-10-24 DIAGNOSIS — Z23 FLU VACCINE NEED: ICD-10-CM

## 2023-10-24 PROCEDURE — 90694 FLU VACCINE - QUADRIVALENT - ADJUVANTED: ICD-10-PCS | Mod: S$GLB,,, | Performed by: INTERNAL MEDICINE

## 2023-10-24 PROCEDURE — G0008 FLU VACCINE - QUADRIVALENT - ADJUVANTED: ICD-10-PCS | Mod: S$GLB,,, | Performed by: INTERNAL MEDICINE

## 2023-10-24 PROCEDURE — 90694 VACC AIIV4 NO PRSRV 0.5ML IM: CPT | Mod: S$GLB,,, | Performed by: INTERNAL MEDICINE

## 2023-10-24 PROCEDURE — G0008 ADMIN INFLUENZA VIRUS VAC: HCPCS | Mod: S$GLB,,, | Performed by: INTERNAL MEDICINE

## 2023-10-31 DIAGNOSIS — F41.9 ANXIETY: ICD-10-CM

## 2023-11-01 RX ORDER — LORAZEPAM 1 MG/1
TABLET ORAL
Qty: 30 TABLET | Refills: 1 | Status: SHIPPED | OUTPATIENT
Start: 2023-11-01 | End: 2024-01-17

## 2023-11-01 NOTE — TELEPHONE ENCOUNTER
No care due was identified.  Health [facility] Embedded Care Due Messages. Reference number: [id number].   10/31/2023 7:26:52 PM CDT

## 2023-11-06 ENCOUNTER — LAB VISIT (OUTPATIENT)
Dept: LAB | Facility: HOSPITAL | Age: 78
End: 2023-11-06
Attending: INTERNAL MEDICINE
Payer: MEDICARE

## 2023-11-06 DIAGNOSIS — E11.69 DYSLIPIDEMIA ASSOCIATED WITH TYPE 2 DIABETES MELLITUS: ICD-10-CM

## 2023-11-06 DIAGNOSIS — E78.5 DYSLIPIDEMIA ASSOCIATED WITH TYPE 2 DIABETES MELLITUS: ICD-10-CM

## 2023-11-06 DIAGNOSIS — E11.9 DIET-CONTROLLED DIABETES MELLITUS: ICD-10-CM

## 2023-11-06 LAB
ALBUMIN SERPL BCP-MCNC: 3.9 G/DL (ref 3.5–5.2)
ALP SERPL-CCNC: 46 U/L (ref 55–135)
ALT SERPL W/O P-5'-P-CCNC: 10 U/L (ref 10–44)
ANION GAP SERPL CALC-SCNC: 8 MMOL/L (ref 8–16)
AST SERPL-CCNC: 16 U/L (ref 10–40)
BASOPHILS # BLD AUTO: 0.04 K/UL (ref 0–0.2)
BASOPHILS NFR BLD: 1 % (ref 0–1.9)
BILIRUB SERPL-MCNC: 1.9 MG/DL (ref 0.1–1)
BUN SERPL-MCNC: 12 MG/DL (ref 8–23)
CALCIUM SERPL-MCNC: 9.8 MG/DL (ref 8.7–10.5)
CHLORIDE SERPL-SCNC: 105 MMOL/L (ref 95–110)
CHOLEST SERPL-MCNC: 129 MG/DL (ref 120–199)
CHOLEST/HDLC SERPL: 2.5 {RATIO} (ref 2–5)
CO2 SERPL-SCNC: 27 MMOL/L (ref 23–29)
CREAT SERPL-MCNC: 0.7 MG/DL (ref 0.5–1.4)
DIFFERENTIAL METHOD: ABNORMAL
EOSINOPHIL # BLD AUTO: 0.2 K/UL (ref 0–0.5)
EOSINOPHIL NFR BLD: 3.9 % (ref 0–8)
ERYTHROCYTE [DISTWIDTH] IN BLOOD BY AUTOMATED COUNT: 12.7 % (ref 11.5–14.5)
EST. GFR  (NO RACE VARIABLE): >60 ML/MIN/1.73 M^2
ESTIMATED AVG GLUCOSE: 163 MG/DL (ref 68–131)
GLUCOSE SERPL-MCNC: 151 MG/DL (ref 70–110)
HBA1C MFR BLD: 7.3 % (ref 4–5.6)
HCT VFR BLD AUTO: 36 % (ref 37–48.5)
HDLC SERPL-MCNC: 51 MG/DL (ref 40–75)
HDLC SERPL: 39.5 % (ref 20–50)
HGB BLD-MCNC: 11.8 G/DL (ref 12–16)
IMM GRANULOCYTES # BLD AUTO: 0.01 K/UL (ref 0–0.04)
IMM GRANULOCYTES NFR BLD AUTO: 0.2 % (ref 0–0.5)
LDLC SERPL CALC-MCNC: 62.4 MG/DL (ref 63–159)
LYMPHOCYTES # BLD AUTO: 1.3 K/UL (ref 1–4.8)
LYMPHOCYTES NFR BLD: 32.3 % (ref 18–48)
MCH RBC QN AUTO: 33.4 PG (ref 27–31)
MCHC RBC AUTO-ENTMCNC: 32.8 G/DL (ref 32–36)
MCV RBC AUTO: 102 FL (ref 82–98)
MONOCYTES # BLD AUTO: 0.4 K/UL (ref 0.3–1)
MONOCYTES NFR BLD: 10.1 % (ref 4–15)
NEUTROPHILS # BLD AUTO: 2.1 K/UL (ref 1.8–7.7)
NEUTROPHILS NFR BLD: 52.5 % (ref 38–73)
NONHDLC SERPL-MCNC: 78 MG/DL
NRBC BLD-RTO: 0 /100 WBC
PLATELET # BLD AUTO: 150 K/UL (ref 150–450)
PMV BLD AUTO: 12.1 FL (ref 9.2–12.9)
POTASSIUM SERPL-SCNC: 3.8 MMOL/L (ref 3.5–5.1)
PROT SERPL-MCNC: 6.7 G/DL (ref 6–8.4)
RBC # BLD AUTO: 3.53 M/UL (ref 4–5.4)
SODIUM SERPL-SCNC: 140 MMOL/L (ref 136–145)
TRIGL SERPL-MCNC: 78 MG/DL (ref 30–150)
TSH SERPL DL<=0.005 MIU/L-ACNC: 2.87 UIU/ML (ref 0.4–4)
WBC # BLD AUTO: 4.06 K/UL (ref 3.9–12.7)

## 2023-11-06 PROCEDURE — 80061 LIPID PANEL: CPT | Performed by: INTERNAL MEDICINE

## 2023-11-06 PROCEDURE — 83036 HEMOGLOBIN GLYCOSYLATED A1C: CPT | Performed by: INTERNAL MEDICINE

## 2023-11-06 PROCEDURE — 36415 COLL VENOUS BLD VENIPUNCTURE: CPT | Mod: PO | Performed by: INTERNAL MEDICINE

## 2023-11-06 PROCEDURE — 80053 COMPREHEN METABOLIC PANEL: CPT | Performed by: INTERNAL MEDICINE

## 2023-11-06 PROCEDURE — 84443 ASSAY THYROID STIM HORMONE: CPT | Performed by: INTERNAL MEDICINE

## 2023-11-06 PROCEDURE — 85025 COMPLETE CBC W/AUTO DIFF WBC: CPT | Performed by: INTERNAL MEDICINE

## 2023-11-08 ENCOUNTER — OFFICE VISIT (OUTPATIENT)
Dept: INTERNAL MEDICINE | Facility: CLINIC | Age: 78
End: 2023-11-08
Payer: MEDICARE

## 2023-11-08 VITALS
WEIGHT: 149.25 LBS | HEIGHT: 62 IN | OXYGEN SATURATION: 95 % | BODY MASS INDEX: 27.47 KG/M2 | SYSTOLIC BLOOD PRESSURE: 122 MMHG | DIASTOLIC BLOOD PRESSURE: 62 MMHG | HEART RATE: 81 BPM

## 2023-11-08 DIAGNOSIS — E55.9 VITAMIN D DEFICIENCY: ICD-10-CM

## 2023-11-08 DIAGNOSIS — Z85.038 HISTORY OF COLON CANCER: ICD-10-CM

## 2023-11-08 DIAGNOSIS — Z86.000 HISTORY OF DUCTAL CARCINOMA IN SITU (DCIS) OF BREAST: ICD-10-CM

## 2023-11-08 DIAGNOSIS — E11.9 DIET-CONTROLLED DIABETES MELLITUS: ICD-10-CM

## 2023-11-08 DIAGNOSIS — Z00.00 PREVENTATIVE HEALTH CARE: Primary | ICD-10-CM

## 2023-11-08 DIAGNOSIS — E11.69 DYSLIPIDEMIA ASSOCIATED WITH TYPE 2 DIABETES MELLITUS: ICD-10-CM

## 2023-11-08 DIAGNOSIS — E78.5 DYSLIPIDEMIA ASSOCIATED WITH TYPE 2 DIABETES MELLITUS: ICD-10-CM

## 2023-11-08 DIAGNOSIS — N89.8 VAGINAL IRRITATION: ICD-10-CM

## 2023-11-08 PROCEDURE — 99397 PR PREVENTIVE VISIT,EST,65 & OVER: ICD-10-PCS | Mod: GZ,S$GLB,, | Performed by: INTERNAL MEDICINE

## 2023-11-08 PROCEDURE — 99999 PR PBB SHADOW E&M-EST. PATIENT-LVL V: CPT | Mod: PBBFAC,,, | Performed by: INTERNAL MEDICINE

## 2023-11-08 PROCEDURE — 1160F PR REVIEW ALL MEDS BY PRESCRIBER/CLIN PHARMACIST DOCUMENTED: ICD-10-PCS | Mod: CPTII,S$GLB,, | Performed by: INTERNAL MEDICINE

## 2023-11-08 PROCEDURE — 3072F LOW RISK FOR RETINOPATHY: CPT | Mod: CPTII,S$GLB,, | Performed by: INTERNAL MEDICINE

## 2023-11-08 PROCEDURE — 3072F PR LOW RISK FOR RETINOPATHY: ICD-10-PCS | Mod: CPTII,S$GLB,, | Performed by: INTERNAL MEDICINE

## 2023-11-08 PROCEDURE — 1101F PR PT FALLS ASSESS DOC 0-1 FALLS W/OUT INJ PAST YR: ICD-10-PCS | Mod: CPTII,S$GLB,, | Performed by: INTERNAL MEDICINE

## 2023-11-08 PROCEDURE — 3074F SYST BP LT 130 MM HG: CPT | Mod: CPTII,S$GLB,, | Performed by: INTERNAL MEDICINE

## 2023-11-08 PROCEDURE — 1126F PR PAIN SEVERITY QUANTIFIED, NO PAIN PRESENT: ICD-10-PCS | Mod: CPTII,S$GLB,, | Performed by: INTERNAL MEDICINE

## 2023-11-08 PROCEDURE — 1160F RVW MEDS BY RX/DR IN RCRD: CPT | Mod: CPTII,S$GLB,, | Performed by: INTERNAL MEDICINE

## 2023-11-08 PROCEDURE — 3288F PR FALLS RISK ASSESSMENT DOCUMENTED: ICD-10-PCS | Mod: CPTII,S$GLB,, | Performed by: INTERNAL MEDICINE

## 2023-11-08 PROCEDURE — 99397 PER PM REEVAL EST PAT 65+ YR: CPT | Mod: GZ,S$GLB,, | Performed by: INTERNAL MEDICINE

## 2023-11-08 PROCEDURE — 3078F PR MOST RECENT DIASTOLIC BLOOD PRESSURE < 80 MM HG: ICD-10-PCS | Mod: CPTII,S$GLB,, | Performed by: INTERNAL MEDICINE

## 2023-11-08 PROCEDURE — 3074F PR MOST RECENT SYSTOLIC BLOOD PRESSURE < 130 MM HG: ICD-10-PCS | Mod: CPTII,S$GLB,, | Performed by: INTERNAL MEDICINE

## 2023-11-08 PROCEDURE — 1101F PT FALLS ASSESS-DOCD LE1/YR: CPT | Mod: CPTII,S$GLB,, | Performed by: INTERNAL MEDICINE

## 2023-11-08 PROCEDURE — 3288F FALL RISK ASSESSMENT DOCD: CPT | Mod: CPTII,S$GLB,, | Performed by: INTERNAL MEDICINE

## 2023-11-08 PROCEDURE — 1159F MED LIST DOCD IN RCRD: CPT | Mod: CPTII,S$GLB,, | Performed by: INTERNAL MEDICINE

## 2023-11-08 PROCEDURE — 1126F AMNT PAIN NOTED NONE PRSNT: CPT | Mod: CPTII,S$GLB,, | Performed by: INTERNAL MEDICINE

## 2023-11-08 PROCEDURE — 3078F DIAST BP <80 MM HG: CPT | Mod: CPTII,S$GLB,, | Performed by: INTERNAL MEDICINE

## 2023-11-08 PROCEDURE — 1159F PR MEDICATION LIST DOCUMENTED IN MEDICAL RECORD: ICD-10-PCS | Mod: CPTII,S$GLB,, | Performed by: INTERNAL MEDICINE

## 2023-11-08 PROCEDURE — 99999 PR PBB SHADOW E&M-EST. PATIENT-LVL V: ICD-10-PCS | Mod: PBBFAC,,, | Performed by: INTERNAL MEDICINE

## 2023-11-08 RX ORDER — FLUCONAZOLE 150 MG/1
150 TABLET ORAL DAILY
Qty: 1 TABLET | Refills: 0
Start: 2023-11-08 | End: 2023-11-09

## 2023-11-08 NOTE — PROGRESS NOTES
Subjective     Patient ID: Winnie Ngo is a 78 y.o. female.    Chief Complaint: Annual Exam and Diabetes Mellitus    HPI 78-year-old female presents to clinic today for annual physical follow-up dyslipidemia associated with diabetes patient has diabetes that is diet controlled she is doing very well.  She reports some vaginal irritation for which she was prescribed Diflucan but has not yet taken.  Hemoglobin A1c is 7.3%.  Patient is a breast cancer and colon cancer survivor denies any complaints or issues.  Review of Systems  Otherwise negative     Objective     Physical Exam  General: Well-appearing, well-nourished.  No distress  HEENT: conjunctivae are normal.  Pupils are equal and reative to light.  TM's are clear and intact bilaterally.  Hearing is grossly normal.  Nasopharynx is clear.  Oropharynx is clear.  Neck: Supple.  No thyroid megaly.  No bruits.  Lymph: No cervical or supraclavicular adenopathy.  Heart: Regular rate and rhythm, without murmur, rub or gallop.  Lungs: Clear to auscultation; respiratory effort normal.  Abdomen: Soft, nontender, nondistended.  Normoactive bowel sounds.  No hepatomegaly.  No masses.  Extremities: Good distal pulses.  No edema.  Psych: Oriented to time person place.  Judgment and insight seem unimpaired.  Mood and affect are appropriate.     Assessment and Plan     1. Preventative health care    2. Vaginal irritation    3. Dyslipidemia associated with type 2 diabetes mellitus  -     Comprehensive Metabolic Panel; Standing; Expected date: 11/08/2023  -     Hemoglobin A1C; Standing; Expected date: 11/08/2023  -     Lipid Panel; Standing; Expected date: 11/08/2023    4. Diet-controlled diabetes mellitus  -     Ambulatory referral/consult to Optometry; Future; Expected date: 11/15/2023    5. History of ductal carcinoma in situ (DCIS) of breast  Overview:  2016 lumpectomy       6. History of colon cancer  Overview:  Sigmoid colectomy in 9/00 with Dr Samuel and current specialist  Dr Way.  Last colonoscopy in 2013 and patient prefers Cologuard due to prep difficulty.    Orders:  -     Vitamin D; Future; Expected date: 11/08/2023    7. Vitamin D deficiency  -     Vitamin D; Future; Expected date: 11/08/2023    Other orders  -     fluconazole (DIFLUCAN) 150 MG Tab; Take 1 tablet (150 mg total) by mouth once daily. for 1 day  Dispense: 1 tablet; Refill: 0        Winnie was seen today for annual exam and diabetes mellitus.    Diagnoses and all orders for this visit:    Preventative health care  Healthcare maintenance performed, reviewed, updated and counseled today.  Vaginal irritation  Recommended treatment with Diflucan that was already prescribed she can hold her statin medication while on treatment  Dyslipidemia associated with type 2 diabetes mellitus  -     Comprehensive Metabolic Panel; Standing  -     Hemoglobin A1C; Standing  -     Lipid Panel; Standing  Controlled.  Continue current medical regimen.  Prescription refills addressed.  Followup advised. See after visit summary.  Diet-controlled diabetes mellitus  -     Ambulatory referral/consult to Optometry; Future  Controlled continue current regimen  History of ductal carcinoma in situ (DCIS) of breast  Asymptomatic at surveillance by Oncology  History of colon cancer  -     Vitamin D; Future    Vitamin D deficiency  -     Vitamin D; Future    Other orders  -     fluconazole (DIFLUCAN) 150 MG Tab; Take 1 tablet (150 mg total) by mouth once daily. for 1 day              Follow up in about 6 months (around 5/8/2024) for diabetes cmp hba1c lipids.

## 2023-12-28 DIAGNOSIS — K21.9 GASTROESOPHAGEAL REFLUX DISEASE WITHOUT ESOPHAGITIS: ICD-10-CM

## 2023-12-28 RX ORDER — PANTOPRAZOLE SODIUM 20 MG/1
20 TABLET, DELAYED RELEASE ORAL
Qty: 90 TABLET | Refills: 3 | Status: SHIPPED | OUTPATIENT
Start: 2023-12-28

## 2023-12-28 NOTE — TELEPHONE ENCOUNTER
No care due was identified.  Health [facility] Embedded Care Due Messages. Reference number: [id number].   12/28/2023 9:35:41 AM CST

## 2023-12-28 NOTE — TELEPHONE ENCOUNTER
Refill Decision Note   Winnie Ngo  is requesting a refill authorization.  Brief Assessment and Rationale for Refill:  Approve     Medication Therapy Plan:         Comments:     Note composed:11:59 AM 12/28/2023

## 2024-01-16 DIAGNOSIS — F41.9 ANXIETY: ICD-10-CM

## 2024-01-16 NOTE — TELEPHONE ENCOUNTER
No care due was identified.  Health [facility] Embedded Care Due Messages. Reference number: [id number].   1/16/2024 11:57:55 AM CST

## 2024-01-17 RX ORDER — LORAZEPAM 1 MG/1
TABLET ORAL
Qty: 30 TABLET | Refills: 3 | Status: SHIPPED | OUTPATIENT
Start: 2024-01-17 | End: 2024-05-31

## 2024-02-15 DIAGNOSIS — E78.5 HYPERLIPIDEMIA, UNSPECIFIED HYPERLIPIDEMIA TYPE: ICD-10-CM

## 2024-02-15 NOTE — TELEPHONE ENCOUNTER
No care due was identified.  [facility] Embedded Care Due Messages. Reference number: [id number].   2/15/2024 1:39:20 PM CST

## 2024-02-16 RX ORDER — ATORVASTATIN CALCIUM 10 MG/1
10 TABLET, FILM COATED ORAL NIGHTLY
Qty: 90 TABLET | Refills: 3 | Status: SHIPPED | OUTPATIENT
Start: 2024-02-16

## 2024-02-16 NOTE — TELEPHONE ENCOUNTER
Refill Decision Note   Winnie Ngo  is requesting a refill authorization.  Brief Assessment and Rationale for Refill:  Approve     Medication Therapy Plan:         Comments:     Note composed:8:52 AM 02/16/2024

## 2024-02-27 ENCOUNTER — OFFICE VISIT (OUTPATIENT)
Dept: SURGERY | Facility: CLINIC | Age: 79
End: 2024-02-27
Payer: MEDICARE

## 2024-02-27 ENCOUNTER — HOSPITAL ENCOUNTER (OUTPATIENT)
Dept: RADIOLOGY | Facility: HOSPITAL | Age: 79
Discharge: HOME | End: 2024-02-27
Attending: SURGERY
Payer: MEDICARE

## 2024-02-27 VITALS
HEART RATE: 80 BPM | WEIGHT: 152 LBS | DIASTOLIC BLOOD PRESSURE: 63 MMHG | OXYGEN SATURATION: 92 % | BODY MASS INDEX: 27.97 KG/M2 | HEIGHT: 62 IN | SYSTOLIC BLOOD PRESSURE: 145 MMHG

## 2024-02-27 DIAGNOSIS — Z12.31 SCREENING MAMMOGRAM, ENCOUNTER FOR: ICD-10-CM

## 2024-02-27 DIAGNOSIS — Z12.31 SCREENING MAMMOGRAM FOR BREAST CANCER: Primary | ICD-10-CM

## 2024-02-27 DIAGNOSIS — Z17.0 MALIGNANT NEOPLASM OF OVERLAPPING SITES OF LEFT BREAST IN FEMALE, ESTROGEN RECEPTOR POSITIVE: ICD-10-CM

## 2024-02-27 DIAGNOSIS — C50.812 MALIGNANT NEOPLASM OF OVERLAPPING SITES OF LEFT BREAST IN FEMALE, ESTROGEN RECEPTOR POSITIVE: ICD-10-CM

## 2024-02-27 DIAGNOSIS — Z90.12 S/P MASTECTOMY, LEFT: ICD-10-CM

## 2024-02-27 DIAGNOSIS — C50.912 INVASIVE DUCTAL CARCINOMA OF BREAST, LEFT: ICD-10-CM

## 2024-02-27 PROCEDURE — 77067 SCR MAMMO BI INCL CAD: CPT | Mod: TC,52

## 2024-02-27 PROCEDURE — 3288F FALL RISK ASSESSMENT DOCD: CPT | Mod: CPTII,S$GLB,, | Performed by: SURGERY

## 2024-02-27 PROCEDURE — 3077F SYST BP >= 140 MM HG: CPT | Mod: CPTII,S$GLB,, | Performed by: SURGERY

## 2024-02-27 PROCEDURE — 99999 PR PBB SHADOW E&M-EST. PATIENT-LVL IV: CPT | Mod: PBBFAC,,, | Performed by: SURGERY

## 2024-02-27 PROCEDURE — 1101F PT FALLS ASSESS-DOCD LE1/YR: CPT | Mod: CPTII,S$GLB,, | Performed by: SURGERY

## 2024-02-27 PROCEDURE — 99213 OFFICE O/P EST LOW 20 MIN: CPT | Mod: S$GLB,,, | Performed by: SURGERY

## 2024-02-27 PROCEDURE — 3078F DIAST BP <80 MM HG: CPT | Mod: CPTII,S$GLB,, | Performed by: SURGERY

## 2024-02-27 PROCEDURE — 1126F AMNT PAIN NOTED NONE PRSNT: CPT | Mod: CPTII,S$GLB,, | Performed by: SURGERY

## 2024-02-27 PROCEDURE — 77063 BREAST TOMOSYNTHESIS BI: CPT | Mod: 26,52,, | Performed by: RADIOLOGY

## 2024-02-27 PROCEDURE — 77067 SCR MAMMO BI INCL CAD: CPT | Mod: 26,52,, | Performed by: RADIOLOGY

## 2024-02-27 NOTE — PROGRESS NOTES
[facility]           Breast Surgery Surveillance    2024    DIAGNOSIS:   This is a 79 y.o. 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    TREATMENT:   1. Status post left total mastectomy and sentinel node biopsy on 3/22/2023.  Final pathology showed 10 mm IDC with clear margins. DCIS 9 mm from the posterior margin and greater than 10 mm from all other margins.  sentinel lymph node negative.  2. Adjuvant endocrine therapy started wit Tamoxifen but ultimately stopped due to side effects. Dr. Mark M.D. Medical Oncology     HISTORY OF PRESENT ILLNESS:   Winnie Ngo is a 79 y.o. female comes in for oncological follow up.  She denies change in her breast self-exam specifically denying new masses, skin or nipple changes, or nipple discharge. Past medical and surgical history is updated without new changes. There have been no changes to family history. The patient denies constitutional symptoms of night sweats, weight loss, new headaches, visual changes, new back or bony pain, chest pain, or shortness of breath.      IMAGIN/27/24 Right Screening Mammo:     Findings:  This procedure was performed using tomosynthesis.   Computer-aided detection was utilized in the interpretation of this examination.     The right breast is heterogeneously dense, which may obscure small masses. There is no evidence of suspicious masses, microcalcifications or architectural distortion.     Impression:   No mammographic evidence of malignancy.     BI-RADS Category 1: Negative     Recommendation:  Routine screening mammogram in 1 year, or as clinically indicated.     MEDICATIONS/ALLERGIES:     Review of patient's allergies indicates:   Allergen Reactions    Montelukast Diarrhea     Other reaction(s): Diarrhea  Other reaction(s): Diarrhea    Adhesive      Other reaction(s): Rash / blisters - can use paper tape    Alendronate      Other reaction(s): Stomach  "upset  Other reaction(s): Stomach upset    Aspirin      Other reaction(s): Wheezing    Aspirin      Other reaction(s): Wheezing    Azithromycin Other (See Comments)    Macrodantin [nitrofurantoin macrocrystalline]     Moxifloxacin      Other reaction(s): Diarrhea  Other reaction(s): Diarrhea    Nitrofurantoin monohyd/m-cryst      Other reaction(s): Nausea  Other reaction(s): Nausea    Penicillins      Other reaction(s): Rash / can take Cipro - and Doxycycline    Sulfa (sulfonamide antibiotics)      Other reaction(s): Swelling  Other reaction(s): Hives    Sulfur      Other reaction(s): Hives  Other reaction(s): Swelling       PHYSICAL EXAM:   BP (!) 145/63 (BP Location: Left arm, Patient Position: Sitting, BP Method: Medium (Automatic))   Pulse 80   Ht 5' 2" (1.575 m)   Wt 68.9 kg (152 lb)   SpO2 (!) 92%   BMI 27.80 kg/m²   Physical Exam   Vitals reviewed.  Constitutional: She is oriented to person, place, and time.   HENT:   Head: Normocephalic and atraumatic.   Nose: Nose normal.   Eyes: Pupils are equal, round, and reactive to light. Right eye exhibits no discharge. Left eye exhibits no discharge.   Pulmonary/Chest: Effort normal and breath sounds normal. No stridor. No respiratory distress. She exhibits no mass, no tenderness and no edema. Right breast exhibits no inverted nipple, no mass, no nipple discharge, no skin change and no tenderness. Left breast exhibits no mass, no skin change and no tenderness. No breast swelling or bleeding. Breasts are symmetrical.       Abdominal: Normal appearance.   Genitourinary: No breast swelling or bleeding.   Neurological: She is alert and oriented to person, place, and time.   Skin: Skin is warm and dry.     Psychiatric: Her behavior is normal. Mood, judgment and thought content normal.      ASSESSMENT:   This is a 79 y.o. female without evidence of recurrence by exam, history or imaging.       PLAN:   1. CBE without concerning findings. Patient reassured.   2. " Continue monthly self breast exams and call the clinic with any changes or problems.  3. Right mammogram in 1 year.  4. Return to clinic in 1 year.   5. Script for bra and prosthetic provided.     The patient is in agreement with the plan. Questions were encouraged and answered to patient's satisfaction. Winnie will call our office with any questions or concerns.      Total time spent with the patient: 25.  15 minutes of face to face consultation and 10 minutes of chart review and coordination of care.

## 2024-04-11 ENCOUNTER — PATIENT MESSAGE (OUTPATIENT)
Dept: OBSTETRICS AND GYNECOLOGY | Facility: CLINIC | Age: 79
End: 2024-04-11
Payer: MEDICARE

## 2024-05-01 DIAGNOSIS — J45.30 MILD PERSISTENT ASTHMA WITHOUT COMPLICATION: ICD-10-CM

## 2024-05-01 RX ORDER — CEPHALEXIN 250 MG/1
CAPSULE ORAL
Qty: 180 EACH | Refills: 1 | Status: SHIPPED | OUTPATIENT
Start: 2024-05-01 | End: 2024-05-08 | Stop reason: SDUPTHER

## 2024-05-01 NOTE — TELEPHONE ENCOUNTER
No care due was identified.  [facility] Embedded Care Due Messages. Reference number: [id number].   5/01/2024 12:00:49 PM CDT

## 2024-05-02 NOTE — TELEPHONE ENCOUNTER
Refill Decision Note   Winnie Zuletatles  is requesting a refill authorization.  Brief Assessment and Rationale for Refill:  Approve     Medication Therapy Plan:         Comments:     Note composed:11:01 PM 05/01/2024

## 2024-05-03 ENCOUNTER — LAB VISIT (OUTPATIENT)
Dept: LAB | Facility: HOSPITAL | Age: 79
End: 2024-05-03
Attending: INTERNAL MEDICINE
Payer: MEDICARE

## 2024-05-03 DIAGNOSIS — E55.9 VITAMIN D DEFICIENCY: ICD-10-CM

## 2024-05-03 DIAGNOSIS — Z85.038 HISTORY OF COLON CANCER: ICD-10-CM

## 2024-05-03 DIAGNOSIS — E11.69 DYSLIPIDEMIA ASSOCIATED WITH TYPE 2 DIABETES MELLITUS: ICD-10-CM

## 2024-05-03 DIAGNOSIS — E78.5 DYSLIPIDEMIA ASSOCIATED WITH TYPE 2 DIABETES MELLITUS: ICD-10-CM

## 2024-05-03 LAB
25(OH)D3+25(OH)D2 SERPL-MCNC: 41 NG/ML (ref 30–96)
CHOLEST SERPL-MCNC: 126 MG/DL (ref 120–199)
CHOLEST/HDLC SERPL: 2.5 {RATIO} (ref 2–5)
ESTIMATED AVG GLUCOSE: 151 MG/DL (ref 68–131)
HBA1C MFR BLD: 6.9 % (ref 4–5.6)
HDLC SERPL-MCNC: 51 MG/DL (ref 40–75)
HDLC SERPL: 40.5 % (ref 20–50)
LDLC SERPL CALC-MCNC: 58.2 MG/DL (ref 63–159)
NONHDLC SERPL-MCNC: 75 MG/DL
TRIGL SERPL-MCNC: 84 MG/DL (ref 30–150)

## 2024-05-03 PROCEDURE — 82306 VITAMIN D 25 HYDROXY: CPT | Performed by: INTERNAL MEDICINE

## 2024-05-03 PROCEDURE — 83036 HEMOGLOBIN GLYCOSYLATED A1C: CPT | Performed by: INTERNAL MEDICINE

## 2024-05-03 PROCEDURE — 80061 LIPID PANEL: CPT | Performed by: INTERNAL MEDICINE

## 2024-05-03 PROCEDURE — 36415 COLL VENOUS BLD VENIPUNCTURE: CPT | Mod: PO | Performed by: INTERNAL MEDICINE

## 2024-05-08 ENCOUNTER — OFFICE VISIT (OUTPATIENT)
Dept: INTERNAL MEDICINE | Facility: CLINIC | Age: 79
End: 2024-05-08
Payer: MEDICARE

## 2024-05-08 VITALS
HEART RATE: 83 BPM | HEIGHT: 62 IN | SYSTOLIC BLOOD PRESSURE: 128 MMHG | WEIGHT: 150.81 LBS | BODY MASS INDEX: 27.75 KG/M2 | DIASTOLIC BLOOD PRESSURE: 68 MMHG | OXYGEN SATURATION: 95 %

## 2024-05-08 DIAGNOSIS — Z00.00 PREVENTATIVE HEALTH CARE: ICD-10-CM

## 2024-05-08 DIAGNOSIS — I73.9 PVD (PERIPHERAL VASCULAR DISEASE): ICD-10-CM

## 2024-05-08 DIAGNOSIS — E11.9 DIET-CONTROLLED DIABETES MELLITUS: ICD-10-CM

## 2024-05-08 DIAGNOSIS — E55.9 MILD VITAMIN D DEFICIENCY: Primary | ICD-10-CM

## 2024-05-08 DIAGNOSIS — E78.5 DYSLIPIDEMIA ASSOCIATED WITH TYPE 2 DIABETES MELLITUS: ICD-10-CM

## 2024-05-08 DIAGNOSIS — J45.901 EXACERBATION OF ASTHMA, UNSPECIFIED ASTHMA SEVERITY, UNSPECIFIED WHETHER PERSISTENT: ICD-10-CM

## 2024-05-08 DIAGNOSIS — E11.69 DYSLIPIDEMIA ASSOCIATED WITH TYPE 2 DIABETES MELLITUS: ICD-10-CM

## 2024-05-08 DIAGNOSIS — F13.20 BENZODIAZEPINE DEPENDENCE: ICD-10-CM

## 2024-05-08 DIAGNOSIS — J45.30 MILD PERSISTENT ASTHMA WITHOUT COMPLICATION: ICD-10-CM

## 2024-05-08 PROCEDURE — 3074F SYST BP LT 130 MM HG: CPT | Mod: CPTII,S$GLB,, | Performed by: INTERNAL MEDICINE

## 2024-05-08 PROCEDURE — 99999 PR PBB SHADOW E&M-EST. PATIENT-LVL IV: CPT | Mod: PBBFAC,,, | Performed by: INTERNAL MEDICINE

## 2024-05-08 PROCEDURE — 1159F MED LIST DOCD IN RCRD: CPT | Mod: CPTII,S$GLB,, | Performed by: INTERNAL MEDICINE

## 2024-05-08 PROCEDURE — 1126F AMNT PAIN NOTED NONE PRSNT: CPT | Mod: CPTII,S$GLB,, | Performed by: INTERNAL MEDICINE

## 2024-05-08 PROCEDURE — 3288F FALL RISK ASSESSMENT DOCD: CPT | Mod: CPTII,S$GLB,, | Performed by: INTERNAL MEDICINE

## 2024-05-08 PROCEDURE — 99215 OFFICE O/P EST HI 40 MIN: CPT | Mod: S$GLB,,, | Performed by: INTERNAL MEDICINE

## 2024-05-08 PROCEDURE — 3078F DIAST BP <80 MM HG: CPT | Mod: CPTII,S$GLB,, | Performed by: INTERNAL MEDICINE

## 2024-05-08 PROCEDURE — 1101F PT FALLS ASSESS-DOCD LE1/YR: CPT | Mod: CPTII,S$GLB,, | Performed by: INTERNAL MEDICINE

## 2024-05-08 PROCEDURE — 1160F RVW MEDS BY RX/DR IN RCRD: CPT | Mod: CPTII,S$GLB,, | Performed by: INTERNAL MEDICINE

## 2024-05-08 RX ORDER — PREDNISONE 20 MG/1
40 TABLET ORAL DAILY
Qty: 8 TABLET | Refills: 0 | Status: SHIPPED | OUTPATIENT
Start: 2024-05-08 | End: 2024-05-12

## 2024-05-08 RX ORDER — FLUTICASONE PROPIONATE AND SALMETEROL 100; 50 UG/1; UG/1
1 POWDER RESPIRATORY (INHALATION) 2 TIMES DAILY
Qty: 60 EACH | Refills: 11 | Status: SHIPPED | OUTPATIENT
Start: 2024-05-08

## 2024-05-08 RX ORDER — ALBUTEROL SULFATE 90 UG/1
2 AEROSOL, METERED RESPIRATORY (INHALATION) EVERY 6 HOURS PRN
Qty: 18 G | Refills: 3 | Status: SHIPPED | OUTPATIENT
Start: 2024-05-08

## 2024-05-08 NOTE — PROGRESS NOTES
Patient ID: Winnie Ngo is a 79 y.o. female    Chief Complaint: Diabetes and Follow-up    History of Present Illness  The patient presents here today for diabetes follow-up.    The patient expresses concern regarding her low red blood cell count, which she reports has been consistent in recent times. Despite this, she reports persistent fatigue, which has not worsened.    The patient does not require any medication refills at this time. She acknowledges an error in procuring her Advair Diskus prescription, which only costed 120 dollars at a time. She perceives this as an error, noting that she prefers a single refill at a time, preferring the real version, as opposed to the generic version.    The patient seeks information regarding Breathinn, a medication she has used for a significant period to manage severe asthma attacks. She reports no recent severe attacks, but expresses a preference for Breathinn over terbutaline. She plans to visit her daughter's granddaughter's graduation party on Friday and inquires about the possibility of taking Breathinn during an attack. Her current regimen includes Claritin and occasional bringing the dogs to her residence, despite the presence of carpet. She requests a prescription for prednisone for sporadic use. She recalls emergency room visits thrice in her youth due to severe asthma symptoms, which have since improved. She adheres to precautionary measures or uses her inhaler when necessary. She expresses a dislike for the yellow inhaler and has not received a ProAir inhaler for an extended period. She is contemplating consulting a pulmonologist to explore new asthma medications. She read about Advair on blood sugar levels and is considering a non-steroidal medication.    The patient inquires about the possibility of taking 500 mg of magnesium to aid sleep. She recalls experiencing severe diarrhea in the past, which she attributes to a medication containing magnesium. She  has not contracted influenza. She has been managing her diet and plans to increase her physical activity. She finds it challenging to consume sweets and requests a refill of her atorvastatin prescription. She reports nasal congestion, which she attributes to allergies.    During a recent visit to the podiatrist, the patient noticed bright veins in one of her feet, while the other does not. She has medical compressions and has ordered tighter diabetic socks. She denies any swelling.   The patient is allergic to dogs.    ROS: Otherwise Negative     Physical Exam  General: Well-appearing, well-nourished.  No distress  HEENT: conjunctivae are normal.  Pupils are equal and reative to light.  TM's are clear and intact bilaterally.  Hearing is grossly normal.  Nasopharynx is clear.  Oropharynx is clear.  Neck: Supple.  No thyroid megaly.  No bruits.  Lymph: No cervical or supraclavicular adenopathy.  Heart: Regular rate and rhythm, without murmur, rub or gallop.  Lungs: Clear to auscultation; respiratory effort normal.  Abdomen: Soft, nontender, nondistended.  Normoactive bowel sounds.  No hepatomegaly.  No masses.  Extremities: Good distal pulses.  No edema.  Psych: Oriented to time person place.  Judgment and insight seem unimpaired.  Mood and affect are appropriate.    Results  Laboratory Studies  A1c is down to 6.9%. Vitamin D level is good. Cholesterol numbers look great. Red blood cell count was 11.8 six months ago, normal is 12.    Assessment & Plan  1. Diabetes Mellitus.  The patient's diabetes is well-managed, and her blood pressure is well-regulated.    2. Dyslipidemia.  Her cholesterol levels are well-managed, and her cholesterol levels are well-controlled.    3. Asthma.  The patient's asthma is generally well-managed, with the exception of a single episode. A prescription for prednisone 40 mg, to be taken as 2 tablets for a duration of 4 days, will be provided. The patient is advised to use either Zyrtec or  Claritin prior to attending her granddaughter's graduation party. On the day of her granddaughter's graduation, she is advised to premedicate with albuterol every 4 hours for a few days. On the day of her graduation, she should take Zyrtec, use her preventative inhaler, and use albuterol every 4 hours for a couple of days. If she is particularly concerned about this, she should take prednisone the night before. The morning of her granddaughter's graduation, she should premedicate with albuterol, but separate from Advair to avoid jitteriness.    4. Health maintenance.  The patient is not due for anything at this time, unless she receives a tetanus vaccine at the pharmacy. Her annual physical and checkup will be scheduled in 6 months, during which we will ensure she has all her blood work done. She is also due for urine protein screening. A refill of her atorvastatin prescription will be provided.    5. Nasal congestion.  The patient is advised to change her Claritin flavor and take Zyrtec for a few months. Simply Saline made by [employer] isotonic solution should be used in her shower a few times a week.    6. Varicose veins.  The patient is advised to wear tighter constriction such as knee-highs or compression stockings to prevent further dilation.      Follow up in about 6 months (around 11/8/2024) for physical/diabetes Cbc,tsh, cmp lipid, hba1c+/- PSA.    Winnie was seen today for diabetes and follow-up.    Diagnoses and all orders for this visit:    Mild vitamin D deficiency  Controlled.  Continue current medical regimen.  Prescription refills addressed.  Followup advised. See after visit summary.  Mild persistent asthma without complication  Controlled.  Continue current medical regimen.  Prescription refills addressed.  Followup advised. See after visit summary.  Orders:  -     fluticasone-salmeterol diskus inhaler 100-50 mcg; Inhale 1 puff into the lungs 2 (two) times daily. Controller  -     predniSONE  (DELTASONE) 20 MG tablet; Take 2 tablets (40 mg total) by mouth once daily. for 4 days  -     albuterol (PROVENTIL/VENTOLIN HFA) 90 mcg/actuation inhaler; Inhale 2 puffs into the lungs every 6 (six) hours as needed for Wheezing. May substitute any albuterol inhaler on her plan.  Provided the above only if she has an exacerbation related to upcoming dog exposure  Exacerbation of asthma, unspecified asthma severity, unspecified whether persistent  -     predniSONE (DELTASONE) 20 MG tablet; Take 2 tablets (40 mg total) by mouth once daily. for 4 days  See above  Preventative health care  -     CBC Auto Differential; Future  -     Comprehensive Metabolic Panel; Future  -     Lipid Panel; Future  -     TSH; Future  -     Microalbumin/Creatinine Ratio, Urine; Standing  -     Hemoglobin A1C; Standing  Scheduled  Diet-controlled diabetes mellitus  -     CBC Auto Differential; Future  -     Comprehensive Metabolic Panel; Future  -     Lipid Panel; Future  -     TSH; Future  -     Microalbumin/Creatinine Ratio, Urine; Standing  -     Hemoglobin A1C; Standing  Controlled.  Continue current medical regimen.  Prescription refills addressed.  Followup advised. See after visit summary.  Dyslipidemia associated with type 2 diabetes mellitus  -     CBC Auto Differential; Future  -     Comprehensive Metabolic Panel; Future  -     Lipid Panel; Future  -     TSH; Future  -     Microalbumin/Creatinine Ratio, Urine; Standing  -     Hemoglobin A1C; Standing  Controlled.  Continue current medical regimen.  Prescription refills addressed.  Followup advised. See after visit summary.     Varicose vein near ankle recommend compression stockings or socks    Chronic benzodiazepine usage stable no adverse side effects.   Counseling provided  I spent a total of 40 to thank you minutes on the day of the visit.This includes face to face time and non-face to face time preparing to see the patient (eg, review of tests), obtaining and/or reviewing  separately obtained history, documenting clinical information in the electronic or other health record, independently interpreting results and communicating results to the patient/family/caregiver, or care coordinator.   This note was generated with the assistance of ambient listening technology. Verbal consent was obtained by the patient and accompanying visitor(s) for the recording of patient appointment to facilitate this note. I attest to having reviewed and edited the generated note for accuracy, though some syntax or spelling errors may persist. Please contact the author of this note for any clarification.

## 2024-05-30 ENCOUNTER — PATIENT MESSAGE (OUTPATIENT)
Dept: INTERNAL MEDICINE | Facility: CLINIC | Age: 79
End: 2024-05-30
Payer: MEDICARE

## 2024-05-30 DIAGNOSIS — F41.9 ANXIETY: ICD-10-CM

## 2024-05-30 NOTE — TELEPHONE ENCOUNTER
No care due was identified.  [facility] Embedded Care Due Messages. Reference number: [id number].   5/30/2024 7:54:32 AM CDT

## 2024-05-31 RX ORDER — LORAZEPAM 1 MG/1
1 TABLET ORAL NIGHTLY PRN
Qty: 30 TABLET | Refills: 3 | OUTPATIENT
Start: 2024-05-31

## 2024-06-12 ENCOUNTER — E-VISIT (OUTPATIENT)
Dept: INTERNAL MEDICINE | Facility: CLINIC | Age: 79
End: 2024-06-12
Payer: MEDICARE

## 2024-06-12 ENCOUNTER — PATIENT MESSAGE (OUTPATIENT)
Dept: FAMILY MEDICINE | Facility: CLINIC | Age: 79
End: 2024-06-12
Payer: MEDICARE

## 2024-06-12 DIAGNOSIS — R05.9 COUGH, UNSPECIFIED TYPE: Primary | ICD-10-CM

## 2024-06-12 RX ORDER — BENZONATATE 200 MG/1
200 CAPSULE ORAL 3 TIMES DAILY PRN
Qty: 30 CAPSULE | Refills: 1 | Status: SHIPPED | OUTPATIENT
Start: 2024-06-12 | End: 2024-06-22

## 2024-06-12 NOTE — PROGRESS NOTES
Patient ID: Winnie Ngo is a 79 y.o. female    Chief Complaint: URI (Entered automatically based on patient selection in Patient Portal.)    History of Present Illness      ROS: Otherwise Negative     Physical Exam      Results      Assessment & Plan        No follow-ups on file.    Winnie was seen today for uri.    Diagnoses and all orders for this visit:    Cough, unspecified type  -     benzonatate (TESSALON) 200 MG capsule; Take 1 capsule (200 mg total) by mouth 3 (three) times daily as needed for Cough.         This note was generated with the assistance of ambient listening technology. Verbal consent was obtained by the patient and accompanying visitor(s) for the recording of patient appointment to facilitate this note. I attest to having reviewed and edited the generated note for accuracy, though some syntax or spelling errors may persist. Please contact the author of this note for any clarification.

## 2024-06-12 NOTE — PROGRESS NOTES
Patient ID: Winnie Ngo is a 79 y.o. female.    Chief Complaint: URI (Entered automatically based on patient selection in Patient Portal.)    The patient initiated a request through [employer] on 6/12/2024 for evaluation and management with a chief complaint of URI (Entered automatically based on patient selection in Patient Portal.)     I evaluated the questionnaire submission on 6/12/24.    [facility] E-Visit Covid    6/12/2024 11:08 AM CDT - Filed by Patient   Do you agree to participate in an E-Visit? Yes   If you have any of the following symptoms, go to your local emergency room or call 911: I acknowledge   What is the main issue you would like addressed today? Sinus infection   Do you think you might have COVID or the Flu? No   Have you tested positive for COVID or Flu? No   What symptoms do you currently have?  Cough   Describe your cough: Bothersome (interferes with daily activities)   Have you ever smoked? I have never smoked   Have you had a fever? No   When did your symptoms first appear? 6/10/2024   In the last two weeks, have you been in close contact with someone who has COVID-19 or the Flu? No   List what you have done or taken to help your symptoms. Sudafed, cough drops   How severe are your symptoms? Moderate   Have your symptoms gotten better or worse since they started?  Worse   Do you have transportation to get testing if it is needed and ordered for you at an [facility] location? Yes   Provide any additional information you feel is important.    Please attach any relevant images or files    Are you able to take your vital signs? No         Encounter Diagnosis   Name Primary?    Cough, unspecified type Yes        No orders of the defined types were placed in this encounter.     Medications Ordered This Encounter   Medications    benzonatate (TESSALON) 200 MG capsule     Sig: Take 1 capsule (200 mg total) by mouth 3 (three) times daily as needed for Cough.     Dispense:  30 capsule     Refill:  1         No follow-ups on file.      E-Visit Time Tracking:      15 min

## 2024-07-01 DIAGNOSIS — F41.9 ANXIETY: ICD-10-CM

## 2024-07-01 RX ORDER — LORAZEPAM 1 MG/1
1 TABLET ORAL NIGHTLY PRN
Qty: 30 TABLET | Refills: 3 | Status: SHIPPED | OUTPATIENT
Start: 2024-07-01

## 2024-07-01 NOTE — TELEPHONE ENCOUNTER
Refill Routing Note   Medication(s) are not appropriate for processing by [facility] for the following reason(s):        Outside of protocol    [facility] action(s):  Route               Appointments  past 12m or future 3m with PCP    Date Provider   Last Visit   6/12/2024 Gill Campbell MD   Next Visit   11/13/2024 Gill Campbell MD   ED visits in past 90 days: 0        Note composed:10:01 AM 07/01/2024

## 2024-07-01 NOTE — TELEPHONE ENCOUNTER
No care due was identified.  [facility] Embedded Care Due Messages. Reference number: [id number].   7/01/2024 8:52:01 AM CDT

## 2024-07-30 DIAGNOSIS — Z00.00 ENCOUNTER FOR MEDICARE ANNUAL WELLNESS EXAM: ICD-10-CM

## 2024-08-07 ENCOUNTER — NURSE TRIAGE (OUTPATIENT)
Dept: ADMINISTRATIVE | Facility: CLINIC | Age: 79
End: 2024-08-07
Payer: MEDICARE

## 2024-09-10 ENCOUNTER — OFFICE VISIT (OUTPATIENT)
Dept: FAMILY MEDICINE | Facility: CLINIC | Age: 79
End: 2024-09-10
Payer: MEDICARE

## 2024-09-10 VITALS
WEIGHT: 149.5 LBS | SYSTOLIC BLOOD PRESSURE: 128 MMHG | HEIGHT: 62 IN | OXYGEN SATURATION: 98 % | BODY MASS INDEX: 27.51 KG/M2 | HEART RATE: 90 BPM | DIASTOLIC BLOOD PRESSURE: 70 MMHG

## 2024-09-10 DIAGNOSIS — J30.9 ALLERGIC RHINITIS, UNSPECIFIED SEASONALITY, UNSPECIFIED TRIGGER: ICD-10-CM

## 2024-09-10 DIAGNOSIS — J01.90 ACUTE NON-RECURRENT SINUSITIS, UNSPECIFIED LOCATION: Primary | ICD-10-CM

## 2024-09-10 PROCEDURE — 3074F SYST BP LT 130 MM HG: CPT | Mod: CPTII,S$GLB,, | Performed by: FAMILY MEDICINE

## 2024-09-10 PROCEDURE — 99214 OFFICE O/P EST MOD 30 MIN: CPT | Mod: S$GLB,,, | Performed by: FAMILY MEDICINE

## 2024-09-10 PROCEDURE — 1125F AMNT PAIN NOTED PAIN PRSNT: CPT | Mod: CPTII,S$GLB,, | Performed by: FAMILY MEDICINE

## 2024-09-10 PROCEDURE — 3288F FALL RISK ASSESSMENT DOCD: CPT | Mod: CPTII,S$GLB,, | Performed by: FAMILY MEDICINE

## 2024-09-10 PROCEDURE — 1101F PT FALLS ASSESS-DOCD LE1/YR: CPT | Mod: CPTII,S$GLB,, | Performed by: FAMILY MEDICINE

## 2024-09-10 PROCEDURE — 3078F DIAST BP <80 MM HG: CPT | Mod: CPTII,S$GLB,, | Performed by: FAMILY MEDICINE

## 2024-09-10 PROCEDURE — 99999 PR PBB SHADOW E&M-EST. PATIENT-LVL II: CPT | Mod: PBBFAC,,, | Performed by: FAMILY MEDICINE

## 2024-09-10 RX ORDER — AZITHROMYCIN 250 MG/1
TABLET, FILM COATED ORAL
Qty: 6 TABLET | Refills: 0 | Status: SHIPPED | OUTPATIENT
Start: 2024-09-10 | End: 2024-09-15

## 2024-09-10 RX ORDER — CETIRIZINE HYDROCHLORIDE 10 MG/1
10 TABLET ORAL DAILY
Qty: 30 TABLET | Refills: 11 | Status: SHIPPED | OUTPATIENT
Start: 2024-09-10 | End: 2025-09-10

## 2024-09-10 NOTE — PROGRESS NOTES
Subjective     Patient ID: Winnie Ngo is a 79 y.o. female.    Chief Complaint: Conjunctivitis, Fatigue, Cough, and Sinus Problem    Pt is here for sinus congestion, cough, runny nose, and facial pressure.      Conjunctivitis  Associated symptoms include congestion, coughing and fatigue. Pertinent negatives include no abdominal pain, chest pain, chills, fever, headaches, nausea, rash, vomiting or weakness.   Fatigue  Associated symptoms include congestion, coughing and fatigue. Pertinent negatives include no abdominal pain, chest pain, chills, fever, headaches, nausea, rash, vomiting or weakness.   Cough  Associated symptoms include postnasal drip and rhinorrhea. Pertinent negatives include no chest pain, chills, ear pain, fever, headaches, rash, shortness of breath or wheezing.   Sinus Problem  Associated symptoms include congestion, coughing and sinus pressure. Pertinent negatives include no chills, ear pain, headaches or shortness of breath.     Review of Systems   Constitutional:  Positive for fatigue. Negative for appetite change, chills, fever and unexpected weight change.   HENT:  Positive for nasal congestion, postnasal drip, rhinorrhea and sinus pressure/congestion. Negative for ear pain, trouble swallowing and voice change.    Respiratory:  Positive for cough. Negative for shortness of breath and wheezing.    Cardiovascular:  Negative for chest pain and palpitations.   Gastrointestinal:  Negative for abdominal pain, blood in stool, constipation, diarrhea, nausea and vomiting.   Endocrine: Negative for cold intolerance and heat intolerance.   Genitourinary:  Negative for dysuria and hematuria.   Integumentary:  Negative for color change and rash.   Neurological:  Negative for seizures, speech difficulty, weakness, headaches and coordination difficulties.   Psychiatric/Behavioral:  Negative for confusion, depressed mood and sleep disturbance.       Objective     Physical Exam  Vitals and nursing note  reviewed.   Constitutional:       General: She is not in acute distress.     Appearance: Normal appearance. She is not ill-appearing.   HENT:      Head: Normocephalic and atraumatic.      Right Ear: Tympanic membrane, ear canal and external ear normal.      Left Ear: Tympanic membrane, ear canal and external ear normal.      Nose: Congestion (yellow) present.      Mouth/Throat:      Mouth: Mucous membranes are moist.      Pharynx: Posterior oropharyngeal erythema present. No oropharyngeal exudate.   Eyes:      General: No scleral icterus.     Extraocular Movements: Extraocular movements intact.      Conjunctiva/sclera: Conjunctivae normal.      Pupils: Pupils are equal, round, and reactive to light.   Cardiovascular:      Rate and Rhythm: Normal rate and regular rhythm.      Pulses: Normal pulses.      Heart sounds: Normal heart sounds.      No gallop.   Pulmonary:      Effort: Pulmonary effort is normal. No respiratory distress.      Breath sounds: Normal breath sounds. No wheezing or rales.   Abdominal:      General: Bowel sounds are normal. There is no distension.      Palpations: Abdomen is soft. There is no mass.      Tenderness: There is no abdominal tenderness.   Musculoskeletal:         General: No swelling, tenderness or deformity. Normal range of motion.      Cervical back: Normal range of motion and neck supple. No rigidity or tenderness.   Skin:     General: Skin is warm and dry.      Coloration: Skin is not jaundiced.      Findings: No rash.   Neurological:      General: No focal deficit present.      Mental Status: She is alert and oriented to person, place, and time.      Cranial Nerves: No cranial nerve deficit.   Psychiatric:         Mood and Affect: Mood normal.         Behavior: Behavior normal.         Thought Content: Thought content normal.         Judgment: Judgment normal.      Assessment and Plan     Acute non-recurrent sinusitis, unspecified location  - Teaching done on acute Sinusitus  including treatment options.  Start Z-kathleen due to PCN allergy.  Also start Zyrtec.  -     azithromycin (Z-KATHLEEN) 250 MG tablet; Take 2 tablets by mouth on day 1; Take 1 tablet by mouth on days 2-5  Dispense: 6 tablet; Refill: 0    Allergic rhinitis, unspecified seasonality, unspecified trigger  - Teaching on AR including avoiding triggers and treatment options.  Continue Zyrtec.  Will start Flonase, Singular and/or saline sinus rinses if needed.   -     cetirizine (ZYRTEC) 10 MG tablet; Take 1 tablet (10 mg total) by mouth once daily.  Dispense: 30 tablet; Refill: 11     Follow up as needed if symptoms persist or worsen

## 2024-09-17 DIAGNOSIS — F41.9 ANXIETY: ICD-10-CM

## 2024-09-17 NOTE — TELEPHONE ENCOUNTER
No care due was identified.  [facility] Embedded Care Due Messages. Reference number: [id number].   9/17/2024 10:54:22 AM CDT

## 2024-09-18 RX ORDER — LORAZEPAM 1 MG/1
1 TABLET ORAL NIGHTLY PRN
Qty: 30 TABLET | Refills: 3 | Status: SHIPPED | OUTPATIENT
Start: 2024-09-18

## 2024-11-04 ENCOUNTER — LAB VISIT (OUTPATIENT)
Dept: LAB | Facility: HOSPITAL | Age: 79
End: 2024-11-04
Attending: INTERNAL MEDICINE
Payer: MEDICARE

## 2024-11-04 DIAGNOSIS — E11.69 DYSLIPIDEMIA ASSOCIATED WITH TYPE 2 DIABETES MELLITUS: ICD-10-CM

## 2024-11-04 DIAGNOSIS — Z00.00 PREVENTATIVE HEALTH CARE: ICD-10-CM

## 2024-11-04 DIAGNOSIS — E78.5 DYSLIPIDEMIA ASSOCIATED WITH TYPE 2 DIABETES MELLITUS: ICD-10-CM

## 2024-11-04 DIAGNOSIS — E11.9 DIET-CONTROLLED DIABETES MELLITUS: ICD-10-CM

## 2024-11-04 LAB
ALBUMIN SERPL BCP-MCNC: 3.9 G/DL (ref 3.5–5.2)
ALBUMIN/CREAT UR: NORMAL UG/MG (ref 0–30)
ALP SERPL-CCNC: 75 U/L (ref 40–150)
ALT SERPL W/O P-5'-P-CCNC: 14 U/L (ref 10–44)
ANION GAP SERPL CALC-SCNC: 8 MMOL/L (ref 8–16)
AST SERPL-CCNC: 19 U/L (ref 10–40)
BASOPHILS # BLD AUTO: 0.05 K/UL (ref 0–0.2)
BASOPHILS NFR BLD: 1.1 % (ref 0–1.9)
BILIRUB SERPL-MCNC: 1.6 MG/DL (ref 0.1–1)
BUN SERPL-MCNC: 12 MG/DL (ref 8–23)
CALCIUM SERPL-MCNC: 9.9 MG/DL (ref 8.7–10.5)
CHLORIDE SERPL-SCNC: 105 MMOL/L (ref 95–110)
CHOLEST SERPL-MCNC: 149 MG/DL (ref 120–199)
CHOLEST/HDLC SERPL: 2.7 {RATIO} (ref 2–5)
CO2 SERPL-SCNC: 27 MMOL/L (ref 23–29)
CREAT SERPL-MCNC: 0.8 MG/DL (ref 0.5–1.4)
CREAT UR-MCNC: 52 MG/DL (ref 15–325)
DIFFERENTIAL METHOD BLD: ABNORMAL
EOSINOPHIL # BLD AUTO: 0.1 K/UL (ref 0–0.5)
EOSINOPHIL NFR BLD: 3 % (ref 0–8)
ERYTHROCYTE [DISTWIDTH] IN BLOOD BY AUTOMATED COUNT: 12.7 % (ref 11.5–14.5)
EST. GFR  (NO RACE VARIABLE): >60 ML/MIN/1.73 M^2
ESTIMATED AVG GLUCOSE: 180 MG/DL (ref 68–131)
GLUCOSE SERPL-MCNC: 179 MG/DL (ref 70–110)
HBA1C MFR BLD: 7.9 % (ref 4–5.6)
HCT VFR BLD AUTO: 38.6 % (ref 37–48.5)
HDLC SERPL-MCNC: 55 MG/DL (ref 40–75)
HDLC SERPL: 36.9 % (ref 20–50)
HGB BLD-MCNC: 12.7 G/DL (ref 12–16)
IMM GRANULOCYTES # BLD AUTO: 0.02 K/UL (ref 0–0.04)
IMM GRANULOCYTES NFR BLD AUTO: 0.5 % (ref 0–0.5)
LDLC SERPL CALC-MCNC: 74.6 MG/DL (ref 63–159)
LYMPHOCYTES # BLD AUTO: 1.4 K/UL (ref 1–4.8)
LYMPHOCYTES NFR BLD: 32.3 % (ref 18–48)
MCH RBC QN AUTO: 33.4 PG (ref 27–31)
MCHC RBC AUTO-ENTMCNC: 32.9 G/DL (ref 32–36)
MCV RBC AUTO: 102 FL (ref 82–98)
MICROALBUMIN UR DL<=1MG/L-MCNC: <5 UG/ML
MONOCYTES # BLD AUTO: 0.4 K/UL (ref 0.3–1)
MONOCYTES NFR BLD: 8.7 % (ref 4–15)
NEUTROPHILS # BLD AUTO: 2.4 K/UL (ref 1.8–7.7)
NEUTROPHILS NFR BLD: 54.4 % (ref 38–73)
NONHDLC SERPL-MCNC: 94 MG/DL
NRBC BLD-RTO: 0 /100 WBC
PLATELET # BLD AUTO: 164 K/UL (ref 150–450)
PMV BLD AUTO: 11.9 FL (ref 9.2–12.9)
POTASSIUM SERPL-SCNC: 3.7 MMOL/L (ref 3.5–5.1)
PROT SERPL-MCNC: 7.1 G/DL (ref 6–8.4)
RBC # BLD AUTO: 3.8 M/UL (ref 4–5.4)
SODIUM SERPL-SCNC: 140 MMOL/L (ref 136–145)
TRIGL SERPL-MCNC: 97 MG/DL (ref 30–150)
TSH SERPL DL<=0.005 MIU/L-ACNC: 2.57 UIU/ML (ref 0.4–4)
WBC # BLD AUTO: 4.39 K/UL (ref 3.9–12.7)

## 2024-11-04 PROCEDURE — 82570 ASSAY OF URINE CREATININE: CPT | Performed by: INTERNAL MEDICINE

## 2024-11-04 PROCEDURE — 80053 COMPREHEN METABOLIC PANEL: CPT | Performed by: INTERNAL MEDICINE

## 2024-11-04 PROCEDURE — 36415 COLL VENOUS BLD VENIPUNCTURE: CPT | Mod: PO | Performed by: INTERNAL MEDICINE

## 2024-11-04 PROCEDURE — 85025 COMPLETE CBC W/AUTO DIFF WBC: CPT | Performed by: INTERNAL MEDICINE

## 2024-11-04 PROCEDURE — 80061 LIPID PANEL: CPT | Performed by: INTERNAL MEDICINE

## 2024-11-04 PROCEDURE — 84443 ASSAY THYROID STIM HORMONE: CPT | Performed by: INTERNAL MEDICINE

## 2024-11-04 PROCEDURE — 83036 HEMOGLOBIN GLYCOSYLATED A1C: CPT | Performed by: INTERNAL MEDICINE

## 2024-11-13 ENCOUNTER — OFFICE VISIT (OUTPATIENT)
Dept: INTERNAL MEDICINE | Facility: CLINIC | Age: 79
End: 2024-11-13
Payer: MEDICARE

## 2024-11-13 VITALS
DIASTOLIC BLOOD PRESSURE: 78 MMHG | HEART RATE: 75 BPM | OXYGEN SATURATION: 96 % | SYSTOLIC BLOOD PRESSURE: 120 MMHG | BODY MASS INDEX: 26.41 KG/M2 | WEIGHT: 144.38 LBS

## 2024-11-13 DIAGNOSIS — L30.4 INTERTRIGO: ICD-10-CM

## 2024-11-13 DIAGNOSIS — E11.9 DIET-CONTROLLED DIABETES MELLITUS: ICD-10-CM

## 2024-11-13 DIAGNOSIS — Z00.00 PREVENTATIVE HEALTH CARE: ICD-10-CM

## 2024-11-13 DIAGNOSIS — Z23 NEED FOR VACCINATION: Primary | ICD-10-CM

## 2024-11-13 DIAGNOSIS — E11.65 TYPE 2 DIABETES MELLITUS WITH HYPERGLYCEMIA, WITHOUT LONG-TERM CURRENT USE OF INSULIN: ICD-10-CM

## 2024-11-13 DIAGNOSIS — L71.9 ROSACEA: ICD-10-CM

## 2024-11-13 DIAGNOSIS — N76.0 ACUTE VAGINITIS: ICD-10-CM

## 2024-11-13 PROCEDURE — 99999 PR PBB SHADOW E&M-EST. PATIENT-LVL IV: CPT | Mod: PBBFAC,,, | Performed by: INTERNAL MEDICINE

## 2024-11-13 RX ORDER — FLUCONAZOLE 150 MG/1
150 TABLET ORAL WEEKLY
Qty: 2 TABLET | Refills: 0 | Status: SHIPPED | OUTPATIENT
Start: 2024-11-13 | End: 2024-11-21

## 2024-11-13 RX ORDER — CLOTRIMAZOLE AND BETAMETHASONE DIPROPIONATE 10; .64 MG/G; MG/G
CREAM TOPICAL 2 TIMES DAILY
Qty: 45 G | Refills: 0 | Status: SHIPPED | OUTPATIENT
Start: 2024-11-13

## 2024-11-15 NOTE — PROGRESS NOTES
Patient ID: Winnie Ngo is a 79 y.o. female    Chief Complaint: Annual Exam    History of Present Illness  The patient is an 79-year-old female who presents today for her annual physical, diabetes follow-up, and evaluation of a rash.    She has been dealing with a yeast infection that has not yet resolved. These symptoms began after she started taking antibiotics for a sinus infection in October 2024. She reports no itching in the vaginal area but does experience itching at the top of her vulva. She has a rash located above her buttocks, which is not currently causing significant discomfort. She has been using an ointment provided by her gynecologist, Dr. Castro, which has been effective, but she has run out of it. She has experienced this issue before, but it was more severe at that time. She was given a single pill and the ointment by Dr. Castro.    She does not monitor her blood pressure at home, but it is typically around 120 when she visits the clinic in the morning. She does not have a blood pressure monitor at home but believes she can obtain one through her Medicare advantage plan.    She is aware of her A1c levels and believes she can manage them. She is hesitant to take metformin due to concerns about becoming dependent on it. She has noticed that her red blood cell count is consistently low and is curious about her urine test results. She is also wondering if her long-term use of Advair could have contributed to her diabetes, as her blood sugar levels were previously normal.    She is seeking advice on the best face wash to use for her rosacea. She did not have any bumps when she last saw her dermatologist, Dr. Bhatia, but did have some redness. Dr. Bhatia suggested a treatment that would temporarily reduce the redness. She finds that her makeup effectively covers the redness and is interested in a cleanser specifically designed for rosacea.    She has noticed a small bump in her ear when cleaning it,  but it is not causing her any pain.    She has been attending family events, including her granddaughter's wedding and two showers.    Supplemental Information:  She went to see an eye doctor in 12/2023. She wants to find an optometrist in the hospital. She had a mammogram of the right breast in 02/2024. She had an operation 2 years ago. She takes Advair once a day.    FAMILY HISTORY  Her uncle had type 2 diabetes.    ROS: Otherwise Negative     Physical Exam    General: Well-appearing, well-nourished.  No distress  HEENT: conjunctivae are normal.  Pupils are equal and reative to light.  TM's are clear and intact bilaterally.  Hearing is grossly normal.  Nasopharynx is clear.  Oropharynx is clear.  Neck: Supple.  No thyroid megaly.  No bruits.  Lymph: No cervical or supraclavicular adenopathy.  Heart: Regular rate and rhythm, without murmur, rub or gallop.  Lungs: Clear to auscultation; respiratory effort normal.  Abdomen: Soft, nontender, nondistended.  Normoactive bowel sounds.  No hepatomegaly.  No masses.  Extremities: Good distal pulses.  No edema.  Psych: Oriented to time person place.  Judgment and insight seem unimpaired.  Mood and affect are appropriate.  Results  Laboratory Studies  A1c is 7.9. Average blood sugar is 180. Red blood cell count is slightly below normal.    Assessment & Plan  1. Yeast infection.  Diflucan was prescribed, to be taken once now and another dose in a week. A cream containing both a steroid and antifungal medication was also prescribed. Both prescriptions will be sent to [facility].    2. Hypertension.  She was advised to monitor her blood pressure periodically at home. No changes to her current treatment were made.    3. Diabetes Mellitus.  Her A1c is 7.9 and average blood sugar is 180. Her red blood cell count is slightly below normal, but within her usual range. Her urine test showed no protein. It was explained that her diabetes is unlikely to be caused by Advair, as the  systemic absorption of topical inhalants is lower. She was encouraged to improve her diet. If her blood sugar levels increase, medication will be initiated.    4. Rosacea.  A note will be sent to her dermatologist, Dr. Bhatia, to inquire about over-the-counter skincare recommendations beyond sun protection.    5. Ear bump.  There is no abnormal growth observed in her ear.    6. Health Maintenance.  She will receive her influenza vaccine today. An appointment with an optometrist will be scheduled for her December eye exam. A mammogram is already scheduled for March 3.    Follow-up  Patient will follow up in 6 months.      Follow up in about 6 months (around 5/13/2025) for diabetes cmp hba1c lipids.    Winnie was seen today for annual exam.    Diagnoses and all orders for this visit:    Need for vaccination  -     influenza (adjuvanted) (Fluad) 45 mcg/0.5 mL IM vaccine (> or = 64 yo) 0.5 mL    Preventative health care  Healthcare maintenance performed, reviewed, updated and counseled today.  Acute vaginitis  -     fluconazole (DIFLUCAN) 150 MG Tab; Take 1 tablet (150 mg total) by mouth once a week. for 2 doses  -     clotrimazole-betamethasone 1-0.05% (LOTRISONE) cream; Apply topically 2 (two) times daily.  Discuss use benefit as well as potential adverse side effects  Intertrigo  -     fluconazole (DIFLUCAN) 150 MG Tab; Take 1 tablet (150 mg total) by mouth once a week. for 2 doses  -     clotrimazole-betamethasone 1-0.05% (LOTRISONE) cream; Apply topically 2 (two) times daily.    Diet-controlled diabetes mellitus  -     Ambulatory referral/consult to Optometry; Future  Recommended dietary compliance may need to resume medication if not improving over the next 3-6 months  Type 2 diabetes mellitus with hyperglycemia, without long-term current use of insulin  -     Comprehensive Metabolic Panel; Standing  -     Hemoglobin A1C; Standing  -     Lipid Panel; Standing    Rosacea  Plans to follow-up with dermatology        This note was generated with the assistance of ambient listening technology. Verbal consent was obtained by the patient and accompanying visitor(s) for the recording of patient appointment to facilitate this note. I attest to having reviewed and edited the generated note for accuracy, though some syntax or spelling errors may persist. Please contact the author of this note for any clarification.

## 2024-11-19 ENCOUNTER — TELEPHONE (OUTPATIENT)
Dept: FAMILY MEDICINE | Facility: CLINIC | Age: 79
End: 2024-11-19
Payer: MEDICARE

## 2024-11-19 DIAGNOSIS — H91.90 HEARING LOSS, UNSPECIFIED HEARING LOSS TYPE, UNSPECIFIED LATERALITY: Primary | ICD-10-CM

## 2024-11-19 NOTE — TELEPHONE ENCOUNTER
----- Message from Ewelina sent at 11/19/2024  3:00 PM CST -----  Type:  Patient Requesting Referral    Who Called:pt  Does the patient already have the specialty appointment scheduled?:no  If yes, what is the date of that appointment?:n/a  Referral to What Specialty:Audiology  Reason for Referral:Hearing Loss  Does the patient want the referral with a specific physician?:yes  [facility]  Patient Requesting a Response?:yes  Would the patient rather a call back or a response via [facility]? call  Best Call Back Number: [phone]  Additional Information:

## 2024-12-03 ENCOUNTER — CLINICAL SUPPORT (OUTPATIENT)
Dept: OTOLARYNGOLOGY | Facility: CLINIC | Age: 79
End: 2024-12-03
Payer: MEDICARE

## 2024-12-03 ENCOUNTER — TELEPHONE (OUTPATIENT)
Dept: AUDIOLOGY | Facility: CLINIC | Age: 79
End: 2024-12-03
Payer: MEDICARE

## 2024-12-03 ENCOUNTER — OFFICE VISIT (OUTPATIENT)
Dept: OTOLARYNGOLOGY | Facility: CLINIC | Age: 79
End: 2024-12-03
Payer: MEDICARE

## 2024-12-03 VITALS
BODY MASS INDEX: 27.64 KG/M2 | SYSTOLIC BLOOD PRESSURE: 137 MMHG | WEIGHT: 151.13 LBS | DIASTOLIC BLOOD PRESSURE: 80 MMHG | HEART RATE: 81 BPM

## 2024-12-03 DIAGNOSIS — H90.3 SENSORINEURAL HEARING LOSS (SNHL) OF BOTH EARS: Primary | Chronic | ICD-10-CM

## 2024-12-03 DIAGNOSIS — H93.19 TINNITUS, UNSPECIFIED LATERALITY: ICD-10-CM

## 2024-12-03 DIAGNOSIS — H91.90 HEARING LOSS, UNSPECIFIED HEARING LOSS TYPE, UNSPECIFIED LATERALITY: ICD-10-CM

## 2024-12-03 DIAGNOSIS — H93.13 TINNITUS AURIUM, BILATERAL: Chronic | ICD-10-CM

## 2024-12-03 DIAGNOSIS — J06.9 UPPER RESPIRATORY TRACT INFECTION, UNSPECIFIED TYPE: ICD-10-CM

## 2024-12-03 DIAGNOSIS — J30.9 CHRONIC ALLERGIC RHINITIS: Chronic | ICD-10-CM

## 2024-12-03 DIAGNOSIS — H90.3 SENSORINEURAL HEARING LOSS, BILATERAL: Primary | ICD-10-CM

## 2024-12-03 PROCEDURE — 3288F FALL RISK ASSESSMENT DOCD: CPT | Mod: CPTII,S$GLB,, | Performed by: OTOLARYNGOLOGY

## 2024-12-03 PROCEDURE — 99999 PR PBB SHADOW E&M-EST. PATIENT-LVL IV: CPT | Mod: PBBFAC,,, | Performed by: OTOLARYNGOLOGY

## 2024-12-03 PROCEDURE — 1160F RVW MEDS BY RX/DR IN RCRD: CPT | Mod: CPTII,S$GLB,, | Performed by: OTOLARYNGOLOGY

## 2024-12-03 PROCEDURE — 99204 OFFICE O/P NEW MOD 45 MIN: CPT | Mod: S$GLB,,, | Performed by: OTOLARYNGOLOGY

## 2024-12-03 PROCEDURE — 1125F AMNT PAIN NOTED PAIN PRSNT: CPT | Mod: CPTII,S$GLB,, | Performed by: OTOLARYNGOLOGY

## 2024-12-03 PROCEDURE — 1159F MED LIST DOCD IN RCRD: CPT | Mod: CPTII,S$GLB,, | Performed by: OTOLARYNGOLOGY

## 2024-12-03 PROCEDURE — 92567 TYMPANOMETRY: CPT | Mod: S$GLB,,, | Performed by: PHYSICIAN ASSISTANT

## 2024-12-03 PROCEDURE — 3075F SYST BP GE 130 - 139MM HG: CPT | Mod: CPTII,S$GLB,, | Performed by: OTOLARYNGOLOGY

## 2024-12-03 PROCEDURE — 92557 COMPREHENSIVE HEARING TEST: CPT | Mod: S$GLB,,, | Performed by: PHYSICIAN ASSISTANT

## 2024-12-03 PROCEDURE — 3079F DIAST BP 80-89 MM HG: CPT | Mod: CPTII,S$GLB,, | Performed by: OTOLARYNGOLOGY

## 2024-12-03 PROCEDURE — 99999 PR PBB SHADOW E&M-EST. PATIENT-LVL II: CPT | Mod: PBBFAC,,, | Performed by: PHYSICIAN ASSISTANT

## 2024-12-03 PROCEDURE — 1101F PT FALLS ASSESS-DOCD LE1/YR: CPT | Mod: CPTII,S$GLB,, | Performed by: OTOLARYNGOLOGY

## 2024-12-03 RX ORDER — FLUTICASONE PROPIONATE 50 MCG
2 SPRAY, SUSPENSION (ML) NASAL DAILY
Qty: 9.9 ML | Refills: 11 | Status: SHIPPED | OUTPATIENT
Start: 2024-12-03

## 2024-12-03 RX ORDER — BROMPHENIRAMINE MALEATE, PSEUDOEPHEDRINE HYDROCHLORIDE, AND DEXTROMETHORPHAN HYDROBROMIDE 2; 30; 10 MG/5ML; MG/5ML; MG/5ML
5 SYRUP ORAL EVERY 8 HOURS PRN
Qty: 118 ML | Refills: 0 | Status: SHIPPED | OUTPATIENT
Start: 2024-12-03 | End: 2024-12-13

## 2024-12-03 NOTE — PROGRESS NOTES
Winnie Ngo, a 79 y.o. female, was seen today in the clinic for an audiologic evaluation.  Patients main complaint was bilateral hearing loss that has been progressing over time.  She has difficulty understanding conversation in the presence of background noise.  She has never worn hearing aids.  She does report a family history of hearing loss.  She reports tinnitus but denies ear pain or ear drainage.      Audiogram results revealed a  mild to profound sloping sensorineural hearing loss in the left ear from 1k-8k Hz and a moderate to profound sensorineural hearing loss in the right ear from 2k-8k Hz.  Speech reception thresholds were noted at 25 dB in the right ear and 25 dB in the left ear.  Speech discrimination scores were 84% in the right ear and 80% in the left ear.  Tympanometry revealed Type A in the right ear and Type A in the left ear. The results of the audiogram were reviewed with the patient and the benefits of amplification were discussed.    Recommendations:  Otologic evaluation  Annual audiogram  Hearing protection when in noise  Hearing aid consultation

## 2024-12-03 NOTE — TELEPHONE ENCOUNTER
Patient called about hearing aid pricing for 2025 as she will have [facility] in January and currently has [employer]. She was trying to determine which would benefit her more. She was quoted $1080.00 - $3240.00 per ear for 2025. She was encouraged to call with any further questions.

## 2024-12-03 NOTE — PROGRESS NOTES
Chief Complaint   Patient presents with    Hearing Loss          .     HPI:  Winnie Ngo is a very pleasant 79 y.o. female here to see me today for the first time for evaluation of hearing loss.  She reports hearing loss that has been gradually progressing over the last several years.  She has not noted any difference in hearing between the ears, with either ear being the worse hearing ear.  She has noted  tinnitus in both ears.  She has not had any recent issues with ear pain or ear drainage.  She has not had any previous otologic surgery.  She denies any history of significant loud noise exposure. She reports that she has had a 1 day history of nasal congestion, sore throat, and facial pressure. She does not think she has had fever. She has been taking zytec intermittently.         Past Medical History:   Diagnosis Date    Allergy     Anxiety     Asthma     Benign neoplasm of colon     Breast cancer     Cancer     colon    Cataract     Chronic instability of knee, unspecified knee     rt knee cap displaced    Colon cancer     COPD (chronic obstructive pulmonary disease)     Diabetes mellitus     Dry eye syndrome     Fever blister     GERD (gastroesophageal reflux disease)     Hyperlipidemia     Joint pain     Osteopenia     Personal history of malignant neoplasm of large intestine     PONV (postoperative nausea and vomiting)      Social History     Socioeconomic History    Marital status:    Tobacco Use    Smoking status: Never     Passive exposure: Never    Smokeless tobacco: Never   Substance and Sexual Activity    Alcohol use: No    Drug use: No    Sexual activity: Never   Other Topics Concern    Are you pregnant or think you may be? No    Breast-feeding No     Social Drivers of Health     Financial Resource Strain: Low Risk  (6/29/2023)    Overall Financial Resource Strain (CARDIA)     Difficulty of Paying Living Expenses: Not very hard   Food Insecurity: No Food Insecurity (6/29/2023)    Hunger  Vital Sign     Worried About Running Out of Food in the Last Year: Never true     Ran Out of Food in the Last Year: Never true   Transportation Needs: No Transportation Needs (6/29/2023)    PRAPARE - Transportation     Lack of Transportation (Medical): No     Lack of Transportation (Non-Medical): No   Physical Activity: Inactive (6/29/2023)    Exercise Vital Sign     Days of Exercise per Week: 0 days     Minutes of Exercise per Session: 0 min   Stress: No Stress Concern Present (6/29/2023)    [employer] [place] [employer] - Occupational Stress Questionnaire     Feeling of Stress : Not at all   Housing Stability: Low Risk  (6/29/2023)    Housing Stability Vital Sign     Unable to Pay for Housing in the Last Year: No     Number of Places Lived in the Last Year: 1     Unstable Housing in the Last Year: No     Past Surgical History:   Procedure Laterality Date    APPENDECTOMY      AXILLARY NODE DISSECTION Left 3/22/2023    Procedure: LYMPHADENECTOMY, AXILLARY-left;  Surgeon: Yokasta Cabrera MD;  Location: [facility];  Service: General;  Laterality: Left;    BREAST BIOPSY Right 1980's    exc bx     BREAST BIOPSY Left 2016    lumpectomy    BREAST BIOPSY Right 2021    BREAST LUMPECTOMY Left 2016    CHOLECYSTECTOMY      COLON SURGERY      stage T1 N0 M0    EYE SURGERY      HYSTERECTOMY      INJECTION FOR SENTINEL NODE IDENTIFICATION Left 3/22/2023    Procedure: INJECTION, FOR SENTINEL NODE IDENTIFICATION-left;  Surgeon: Yokasta Cabrera MD;  Location: [facility];  Service: General;  Laterality: Left;  INJECTION     KNEE SURGERY      arthroscopic    MASTECTOMY Left 3/22/2023    Procedure: MASTECTOMY-left;  Surgeon: Yokasta Cabrera MD;  Location: [facility];  Service: General;  Laterality: Left;  3 hours    NASAL SEPTUM SURGERY      with removal of polyps    SENTINEL LYMPH NODE BIOPSY Left 3/22/2023    Procedure: BIOPSY, LYMPH NODE, SENTINEL-left;  Surgeon: Yokasta Cabrera MD;  Location: [facility];  Service: General;  Laterality:  Left;    sigmoid colectomy      SINUS SURGERY      TOTAL ABDOMINAL HYSTERECTOMY W/ BILATERAL SALPINGOOPHORECTOMY      UPPER ENDOSCOPY W/ BANDING  2004    Schatzki's ring dialated 2 sessile polyps in stomach     Family History   Problem Relation Name Age of Onset    Alzheimer's disease Mother      Kidney disease Mother          kidney stone    Heart disease Father  41        MI    Stroke Father      Cancer Brother          leg    Kidney cancer Brother      Cancer Brother          Kidney cancer    Breast cancer Daughter      No Known Problems Son      Cancer Maternal Uncle      Melanoma Neg Hx           Review of Systems  General: negative for chills, fever or weight loss  Psychological: negative for mood changes or depression  Ophthalmic: negative for blurry vision, photophobia or eye pain  ENT: see HPI  Respiratory: no cough, shortness of breath, or wheezing  Cardiovascular: no chest pain or dyspnea on exertion  Gastrointestinal: no abdominal pain, change in bowel habits, or black/ bloody stools  Musculoskeletal: negative for gait disturbance or muscular weakness  Neurological: no syncope or seizures; no ataxia  Dermatological: negative for puritis,  rash and jaundice  Hematologic/lymphatic: no easy bruising, no new lumps or bumps      Physical Exam:    Vitals:    12/03/24 0822   BP: 137/80   Pulse: 81       Constitutional: Well appearing / communicating without difficutly.  NAD.  Eyes: EOM I Bilaterally  Head/Face: Normocephalic.  Negative paranasal sinus pressure/tenderness.  Salivary glands WNL.  House Brackmann I Bilaterally.    Right Ear: Auricle normal appearance. External Auditory Canal within normal limits no lesions or masses,TM w/o masses/lesions/perforations. TM mobility noted.   Left Ear: Auricle normal appearance. External Auditory Canal within normal limits no lesions or masses,TM w/o masses/lesions/perforations. TM mobility noted.  Rinne Air conduction >bone conduction bilaterally, Mixon midline.    Nose: No gross nasal septal deviation. Inferior Turbinates 3+ bilaterally. No septal perforation. No masses/lesions. External nasal skin appears normal without masses/lesions.  Oral Cavity: Gingiva/lips within normal limits.  Dentition/gingiva healthy appearing. Mucus membranes moist. Floor of mouth soft, no masses palpated. Oral Tongue mobile. Hard Palate appears normal.    Oropharynx: Base of tongue appears normal. No masses/lesions noted. Tonsillar fossa/pharyngeal wall without lesions. Posterior oropharynx WNL.  Soft palate without masses. Midline uvula.   Neck/Lymphatic: No LAD I-VI bilaterally.  No thyromegaly.  No masses noted on exam.      Diagnostic studies:  Audiogram interpreted personally by me and discussed in detail with the patient today. Mild to profound SNHL bilaterally; tympanometry shows type A tympanograms bilaterally.       Assessment:    ICD-10-CM ICD-9-CM    1. Sensorineural hearing loss (SNHL) of both ears  H90.3 389.18 Ambulatory referral/consult to ENT      2. Tinnitus aurium, bilateral  H93.13 388.31       3. Upper respiratory tract infection, unspecified type  J06.9 465.9 brompheniramine-pseudoeph-DM (BROMFED DM) 2-30-10 mg/5 mL Syrp      4. Chronic allergic rhinitis  J30.9 477.9         The primary encounter diagnosis was Sensorineural hearing loss (SNHL) of both ears. Diagnoses of Tinnitus aurium, bilateral, Upper respiratory tract infection, unspecified type, and Chronic allergic rhinitis were also pertinent to this visit.      Plan:  No orders of the defined types were placed in this encounter.    We reviewed the patient's recent audiogram and hearing loss in detail.  We also discussed that she is a good candidate for hearing aids, if and when she the patient is motivated.    We also discussed the use hearing protection when exposed to loud noise, including lawn equipment. Recommend audiogram in 1 year.    We also discussed that tinnitus is most often caused by a hearing loss, and  that as the hair cells are damaged, either genetic or as a result of loud noise exposure, they then cause tinnitus.  Some patients find that restricting the salt or caffeine in their diet helps.  Tinnitus tends to be louder in times of stress and fatigue, and may decrease with time.  Sound machines may also be an effective masking technique if needed at night.    Start flonase 2 sprays per nostril daily  Start nasal saline 1-2 times daily  Continue zytec  Start bromfed prn congestion for 5-7 days    Thank you kindly for allowing me to participate in the patient's care.       Elysia Vargas MD

## 2024-12-05 ENCOUNTER — TELEPHONE (OUTPATIENT)
Dept: OTOLARYNGOLOGY | Facility: CLINIC | Age: 79
End: 2024-12-05
Payer: MEDICARE

## 2024-12-05 NOTE — TELEPHONE ENCOUNTER
Returned call. Pt stated that she feels her symptoms have gotten worse since her visit on 12/3. She is coughing up green mucous and experiencing chest congestion. Would like to know if there is anything the doctor recommends or if an antibiotic can be sent in. Informed I would send the message to Dr. Christensen and call back once I receive her reply. Pt thanked me and verbalized understanding.     ----- Message from Codie sent at 12/5/2024 12:26 PM CST -----  Contact: self  Type:  Needs Medical Advice    Who Called: Pt   Symptoms (please be specific):  Condition has worsen since visit on Tues, 12/3  Pharmacy name and phone #:     [id number] - [place] - [address]  [address] [address]  Phone: [phone] Fax: [phone]  Would the patient rather a call back or a response via [facility]?  Cakk   Best Call Back Number:  [phone]  Please call to advise... Thank you...

## 2024-12-06 RX ORDER — AZITHROMYCIN 250 MG/1
TABLET, FILM COATED ORAL
Qty: 6 TABLET | Refills: 0 | Status: SHIPPED | OUTPATIENT
Start: 2024-12-06

## 2024-12-06 NOTE — TELEPHONE ENCOUNTER
Called pt to let her know that Dr. Christensen sent a prescription for Azithromycin to her pharmacy. Pt thanked me and verbalized understanding.

## 2024-12-24 DIAGNOSIS — K21.9 GASTROESOPHAGEAL REFLUX DISEASE WITHOUT ESOPHAGITIS: ICD-10-CM

## 2024-12-24 RX ORDER — PANTOPRAZOLE SODIUM 20 MG/1
20 TABLET, DELAYED RELEASE ORAL DAILY
Qty: 90 TABLET | Refills: 3 | Status: SHIPPED | OUTPATIENT
Start: 2024-12-24

## 2024-12-24 NOTE — TELEPHONE ENCOUNTER
Refill Decision Note   Winnie Ngo  is requesting a refill authorization.  Brief Assessment and Rationale for Refill:  Approve     Medication Therapy Plan:         Comments:     Note composed:12:05 PM 12/24/2024

## 2024-12-24 NOTE — TELEPHONE ENCOUNTER
No care due was identified.  [facility] Embedded Care Due Messages. Reference number: [id number].   12/24/2024 6:54:23 AM CST

## 2025-01-30 DIAGNOSIS — Z00.00 ENCOUNTER FOR MEDICARE ANNUAL WELLNESS EXAM: ICD-10-CM

## 2025-02-19 DIAGNOSIS — E78.5 HYPERLIPIDEMIA, UNSPECIFIED HYPERLIPIDEMIA TYPE: ICD-10-CM

## 2025-02-20 RX ORDER — ATORVASTATIN CALCIUM 10 MG/1
10 TABLET, FILM COATED ORAL NIGHTLY
Qty: 90 TABLET | Refills: 2 | Status: SHIPPED | OUTPATIENT
Start: 2025-02-20

## 2025-02-20 NOTE — TELEPHONE ENCOUNTER
No care due was identified.  [facility] Embedded Care Due Messages. Reference number: [id number].   2/19/2025 6:54:32 PM CST

## 2025-02-20 NOTE — TELEPHONE ENCOUNTER
Refill Decision Note   Winnie Ngo  is requesting a refill authorization.  Brief Assessment and Rationale for Refill:  Approve     Medication Therapy Plan:         Comments:     Note composed:8:35 AM 02/20/2025

## 2025-02-21 ENCOUNTER — OFFICE VISIT (OUTPATIENT)
Dept: OPTOMETRY | Facility: CLINIC | Age: 80
End: 2025-02-21
Payer: MEDICARE

## 2025-02-21 DIAGNOSIS — B88.0 INFESTATION BY DEMODEX: ICD-10-CM

## 2025-02-21 DIAGNOSIS — E11.9 TYPE 2 DIABETES MELLITUS WITHOUT RETINOPATHY: ICD-10-CM

## 2025-02-21 DIAGNOSIS — H01.004 BLEPHARITIS OF UPPER EYELIDS OF BOTH EYES, UNSPECIFIED TYPE: Primary | ICD-10-CM

## 2025-02-21 DIAGNOSIS — H16.223 KERATOCONJUNCTIVITIS SICCA, NOT SPECIFIED AS SJOGREN'S, BILATERAL: ICD-10-CM

## 2025-02-21 DIAGNOSIS — H52.7 REFRACTION ERROR: ICD-10-CM

## 2025-02-21 DIAGNOSIS — H25.13 NUCLEAR SCLEROSIS OF BOTH EYES: ICD-10-CM

## 2025-02-21 DIAGNOSIS — E11.9 DIET-CONTROLLED DIABETES MELLITUS: ICD-10-CM

## 2025-02-21 DIAGNOSIS — H01.001 BLEPHARITIS OF UPPER EYELIDS OF BOTH EYES, UNSPECIFIED TYPE: Primary | ICD-10-CM

## 2025-02-21 PROCEDURE — 99999 PR PBB SHADOW E&M-EST. PATIENT-LVL III: CPT | Mod: PBBFAC,,, | Performed by: OPTOMETRIST

## 2025-02-21 RX ORDER — LOTILANER OPHTHALMIC SOLUTION 2.5 MG/ML
1 SOLUTION/ DROPS OPHTHALMIC 2 TIMES DAILY
Qty: 10 ML | Refills: 0 | Status: SHIPPED | OUTPATIENT
Start: 2025-02-21 | End: 2025-04-04

## 2025-02-21 NOTE — PROGRESS NOTES
HPI    CC: Diabetic Eye Exam. Dry Eye.    MARIELENA: 2022 Dr. Forrest     (-) Changes in vision   (-) Pain  (+) Irritation: Dry Eye    (+) Itching: During Allergy Season   (-) Flashes  (-) Floaters  (+) Glasses wearer  (-) CL wearer  (+) Uses eye gtts: OTC's PRN OU     Does patient want a refraction today? Yes    (-) Eye injury  (-) Eye surgery   (-)POHx  (-)FOHx    (+)DM  Hemoglobin A1C       Date                     Value               Ref Range             Status                11/04/2024               7.9 (H)             4.0 - 5.6 %           Final                 05/03/2024               6.9 (H)             4.0 - 5.6 %           Final                   11/06/2023               7.3 (H)             4.0 - 5.6 %           Final                   Last edited by Allie Forrest, OD on 2/21/2025 10:05 AM.            Assessment /Plan     For exam results, see Encounter Report.    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  Dispense: 10 mL; Refill: 0    Infestation by Demodex  -     lotilaner (XDEMVY) 0.25 % Drop; Place 1 drop into both eyes 2 (two) times a day.  Dispense: 10 mL; Refill: 0    Keratoconjunctivitis sicca, not specified as Sjogren's, bilateral    Type 2 diabetes mellitus without retinopathy    Diet-controlled diabetes mellitus  -     Ambulatory referral/consult to Optometry    Nuclear sclerosis of both eyes    Refraction error      1-2. Educated pt on findings. Demodex blepharitis OU. Rx Xdemvy 1 gtt OU BID for 6 weeks then d/c use. Recommend ocusoft lid scrubs Qday. Monitor 6-8 weeks.      3. Educated pt on findings. Significant dry eye OU. Recommended ATs TID-QID + bennie/gel QHS for now. Will most likely Rx restasis / xiidra at f/u. If symptoms worsen or dont improve, RTC. Monitor 6-8 weeks.    4-5. No retinopathy noted, OU. Continue proper BS control and annual diabetic eye exams. Monitor yearly.      6.  Educated pt on findings. Visually significant cataracts OU, however, okay to wait on removal at this time. Monitor yearly.        7. Will complete refraction at f/u. Monitor 6-8 weeks.     Visit today included increased complexity associated with the care of the pt's ocular surface disease and managing the longitudinal care of the patient due to the serious and/or complex managed problem.       RTC in 6-8 weeks for dry eye / bleph f/u or sooner if needed.

## 2025-02-27 NOTE — PROGRESS NOTES
[facility]                   Breast Surgery                     Surveillance    3/3/2025    DIAGNOSIS:   This is a 80 y.o. female with a history of stage pT1b N0 M0 grade 1 ER + IL + HER2 - IDC of the left breast.    TREATMENT:   1. Status post left total mastectomy and sentinel node biopsy on 3/22/2023.  Final pathology showed 10 mm IDC with clear margins. DCIS 9 mm from the posterior margin and greater than 10 mm from all other margins.  sentinel lymph node negative.  2. Adjuvant endocrine therapy started wit Tamoxifen but ultimately stopped due to side effects. Dr. Mark M.D. Medical Oncology     HISTORY OF PRESENT ILLNESS:   Winnie Ngo is a 80 y.o. female comes in for oncological follow up. Doing well. Does admit to some anxiety regarding her chances of local recurrence and struggles with self exam. She denies change in her breast self-exam specifically denying new masses, skin or nipple changes, or nipple discharge. Past medical and surgical history is updated without new changes. There have been no changes to family history. The patient denies constitutional symptoms of night sweats, weight loss, new headaches, visual changes, new back or bony pain, chest pain, or shortness of breath.      IMAGIN/27/24 Right Screening Mammo:     Findings:  This procedure was performed using tomosynthesis.   Computer-aided detection was utilized in the interpretation of this examination.     The right breast is heterogeneously dense, which may obscure small masses. There is no evidence of suspicious masses, microcalcifications or architectural distortion.     Impression:   No mammographic evidence of malignancy.     BI-RADS Category 1: Negative     Recommendation:  Routine screening mammogram in 1 year, or as clinically indicated.     MEDICATIONS/ALLERGIES:     Review of patient's allergies indicates:   Allergen Reactions    Montelukast Diarrhea     Other reaction(s): Diarrhea  Other reaction(s):  "Diarrhea    Adhesive      Other reaction(s): Rash / blisters - can use paper tape    Alendronate      Other reaction(s): Stomach upset  Other reaction(s): Stomach upset    Aspirin      Other reaction(s): Wheezing    Aspirin      Other reaction(s): Wheezing    Macrodantin [nitrofurantoin macrocrystalline]     Moxifloxacin      Other reaction(s): Diarrhea  Other reaction(s): Diarrhea    Nitrofurantoin monohyd/m-cryst      Other reaction(s): Nausea  Other reaction(s): Nausea    Penicillins      Other reaction(s): Rash / can take Cipro - and Doxycycline    Sulfa (sulfonamide antibiotics)      Other reaction(s): Swelling  Other reaction(s): Hives    Sulfur      Other reaction(s): Hives  Other reaction(s): Swelling       PHYSICAL EXAM:   /81 (BP Location: Right arm, Patient Position: Sitting)   Pulse 69   Ht 5' 2" (1.575 m)   Wt 67.6 kg (149 lb)   SpO2 96%   BMI 27.25 kg/m²   Physical Exam   Vitals reviewed.  Constitutional: She is oriented to person, place, and time.   HENT:   Head: Normocephalic and atraumatic.   Nose: Nose normal.   Eyes: Pupils are equal, round, and reactive to light. Right eye exhibits no discharge. Left eye exhibits no discharge.   Pulmonary/Chest: Effort normal and breath sounds normal. No stridor. No respiratory distress. She exhibits no mass, no tenderness and no edema. Right breast exhibits no inverted nipple, no mass, no nipple discharge, no skin change and no tenderness. Left breast exhibits no mass, no skin change and no tenderness. No breast swelling or bleeding. Breasts are symmetrical.       Abdominal: Normal appearance.   Genitourinary: No breast swelling or bleeding.   Neurological: She is alert and oriented to person, place, and time.   Skin: Skin is warm and dry.     Psychiatric: Her behavior is normal. Mood, judgment and thought content normal.      ASSESSMENT:   This is a 80 y.o. female without evidence of recurrence by exam, history or imaging.       PLAN:   1. CBE " without concerning findings. Patient reassured.   2. Continue monthly self breast exams and call the clinic with any changes or problems.  3. Right mammogram in 1 year.  4. Return to clinic in 1 year.   5. Script for bra and prosthetic provided.     The patient is in agreement with the plan. Questions were encouraged and answered to patient's satisfaction. Winnie will call our office with any questions or concerns.

## 2025-03-02 DIAGNOSIS — F41.9 ANXIETY: ICD-10-CM

## 2025-03-03 ENCOUNTER — HOSPITAL ENCOUNTER (OUTPATIENT)
Dept: RADIOLOGY | Facility: HOSPITAL | Age: 80
Discharge: HOME | End: 2025-03-03
Attending: SURGERY
Payer: MEDICARE

## 2025-03-03 ENCOUNTER — OFFICE VISIT (OUTPATIENT)
Dept: SURGERY | Facility: CLINIC | Age: 80
End: 2025-03-03
Payer: MEDICARE

## 2025-03-03 VITALS
BODY MASS INDEX: 27.42 KG/M2 | OXYGEN SATURATION: 96 % | SYSTOLIC BLOOD PRESSURE: 135 MMHG | WEIGHT: 149 LBS | HEIGHT: 62 IN | DIASTOLIC BLOOD PRESSURE: 81 MMHG | HEART RATE: 69 BPM

## 2025-03-03 DIAGNOSIS — Z12.39 SCREENING BREAST EXAMINATION: ICD-10-CM

## 2025-03-03 DIAGNOSIS — Z90.12 S/P MASTECTOMY, LEFT: ICD-10-CM

## 2025-03-03 DIAGNOSIS — C50.912 INVASIVE DUCTAL CARCINOMA OF BREAST, LEFT: ICD-10-CM

## 2025-03-03 DIAGNOSIS — Z12.31 SCREENING MAMMOGRAM, ENCOUNTER FOR: ICD-10-CM

## 2025-03-03 DIAGNOSIS — Z85.3 PERSONAL HISTORY OF BREAST CANCER: Primary | ICD-10-CM

## 2025-03-03 PROCEDURE — 77067 SCR MAMMO BI INCL CAD: CPT | Mod: 26,52,, | Performed by: RADIOLOGY

## 2025-03-03 PROCEDURE — 1159F MED LIST DOCD IN RCRD: CPT | Mod: CPTII,S$GLB,, | Performed by: PHYSICIAN ASSISTANT

## 2025-03-03 PROCEDURE — 77063 BREAST TOMOSYNTHESIS BI: CPT | Mod: 26,52,, | Performed by: RADIOLOGY

## 2025-03-03 PROCEDURE — 99214 OFFICE O/P EST MOD 30 MIN: CPT | Mod: S$GLB,,, | Performed by: PHYSICIAN ASSISTANT

## 2025-03-03 PROCEDURE — 99999 PR PBB SHADOW E&M-EST. PATIENT-LVL IV: CPT | Mod: PBBFAC,,, | Performed by: PHYSICIAN ASSISTANT

## 2025-03-03 PROCEDURE — 3079F DIAST BP 80-89 MM HG: CPT | Mod: CPTII,S$GLB,, | Performed by: PHYSICIAN ASSISTANT

## 2025-03-03 PROCEDURE — 3075F SYST BP GE 130 - 139MM HG: CPT | Mod: CPTII,S$GLB,, | Performed by: PHYSICIAN ASSISTANT

## 2025-03-03 PROCEDURE — 1101F PT FALLS ASSESS-DOCD LE1/YR: CPT | Mod: CPTII,S$GLB,, | Performed by: PHYSICIAN ASSISTANT

## 2025-03-03 PROCEDURE — 77063 BREAST TOMOSYNTHESIS BI: CPT | Mod: TC,52

## 2025-03-03 PROCEDURE — 1126F AMNT PAIN NOTED NONE PRSNT: CPT | Mod: CPTII,S$GLB,, | Performed by: PHYSICIAN ASSISTANT

## 2025-03-03 PROCEDURE — 3288F FALL RISK ASSESSMENT DOCD: CPT | Mod: CPTII,S$GLB,, | Performed by: PHYSICIAN ASSISTANT

## 2025-03-03 RX ORDER — LORAZEPAM 1 MG/1
1 TABLET ORAL NIGHTLY PRN
Qty: 30 TABLET | Refills: 0 | Status: SHIPPED | OUTPATIENT
Start: 2025-03-03

## 2025-03-03 NOTE — TELEPHONE ENCOUNTER
No care due was identified.  [facility] Embedded Care Due Messages. Reference number: [id number].   3/02/2025 10:39:57 PM CST

## 2025-03-20 ENCOUNTER — TELEPHONE (OUTPATIENT)
Dept: FAMILY MEDICINE | Facility: CLINIC | Age: 80
End: 2025-03-20
Payer: MEDICARE

## 2025-03-20 DIAGNOSIS — H26.9 CATARACT OF BOTH EYES, UNSPECIFIED CATARACT TYPE: Primary | ICD-10-CM

## 2025-03-20 NOTE — TELEPHONE ENCOUNTER
----- Message from Clara sent at 3/20/2025  2:58 PM CDT -----  Regarding: referral for Ophthamology  Good afternoon, patient is wanting a second opinion for her Cataracs and dry eye; would like to see Dr. Ruben Martins. I contacted the office for an appt, however they said they needed a referral before they can schedule. Would you possibly be able to send one for her? Thank you in advance for your time regarding this matter.Thank you,Clara HarperPatient [phone]

## 2025-03-25 ENCOUNTER — TELEPHONE (OUTPATIENT)
Dept: OPHTHALMOLOGY | Facility: CLINIC | Age: 80
End: 2025-03-25
Payer: MEDICARE

## 2025-03-25 ENCOUNTER — OFFICE VISIT (OUTPATIENT)
Dept: FAMILY MEDICINE | Facility: CLINIC | Age: 80
End: 2025-03-25
Payer: MEDICARE

## 2025-03-25 VITALS
HEART RATE: 78 BPM | WEIGHT: 148.81 LBS | DIASTOLIC BLOOD PRESSURE: 72 MMHG | BODY MASS INDEX: 27.38 KG/M2 | HEIGHT: 62 IN | OXYGEN SATURATION: 97 % | SYSTOLIC BLOOD PRESSURE: 112 MMHG

## 2025-03-25 DIAGNOSIS — M85.80 OSTEOPENIA, UNSPECIFIED LOCATION: ICD-10-CM

## 2025-03-25 DIAGNOSIS — G47.09 OTHER INSOMNIA: ICD-10-CM

## 2025-03-25 DIAGNOSIS — C50.812 MALIGNANT NEOPLASM OF OVERLAPPING SITES OF LEFT BREAST IN FEMALE, ESTROGEN RECEPTOR POSITIVE: ICD-10-CM

## 2025-03-25 DIAGNOSIS — E55.9 MILD VITAMIN D DEFICIENCY: ICD-10-CM

## 2025-03-25 DIAGNOSIS — J45.30 MILD PERSISTENT ASTHMA WITHOUT COMPLICATION: ICD-10-CM

## 2025-03-25 DIAGNOSIS — F41.0 PANIC DISORDER (EPISODIC PAROXYSMAL ANXIETY): ICD-10-CM

## 2025-03-25 DIAGNOSIS — E11.69 DYSLIPIDEMIA ASSOCIATED WITH TYPE 2 DIABETES MELLITUS: ICD-10-CM

## 2025-03-25 DIAGNOSIS — F13.20 BENZODIAZEPINE DEPENDENCE: ICD-10-CM

## 2025-03-25 DIAGNOSIS — K21.9 GASTROESOPHAGEAL REFLUX DISEASE, UNSPECIFIED WHETHER ESOPHAGITIS PRESENT: ICD-10-CM

## 2025-03-25 DIAGNOSIS — Z00.00 ENCOUNTER FOR MEDICARE ANNUAL WELLNESS EXAM: Primary | ICD-10-CM

## 2025-03-25 DIAGNOSIS — Z17.0 MALIGNANT NEOPLASM OF OVERLAPPING SITES OF LEFT BREAST IN FEMALE, ESTROGEN RECEPTOR POSITIVE: ICD-10-CM

## 2025-03-25 DIAGNOSIS — E78.5 DYSLIPIDEMIA ASSOCIATED WITH TYPE 2 DIABETES MELLITUS: ICD-10-CM

## 2025-03-25 DIAGNOSIS — I73.9 PVD (PERIPHERAL VASCULAR DISEASE): ICD-10-CM

## 2025-03-25 DIAGNOSIS — E11.9 DIET-CONTROLLED DIABETES MELLITUS: ICD-10-CM

## 2025-03-25 PROBLEM — Z74.09 STIFFNESS DUE TO IMMOBILITY: Status: RESOLVED | Noted: 2023-05-08 | Resolved: 2025-03-25

## 2025-03-25 PROBLEM — R53.1 WEAKNESS: Status: RESOLVED | Noted: 2023-05-08 | Resolved: 2025-03-25

## 2025-03-25 PROBLEM — M25.60 STIFFNESS DUE TO IMMOBILITY: Status: RESOLVED | Noted: 2023-05-08 | Resolved: 2025-03-25

## 2025-03-25 PROCEDURE — 99999 PR PBB SHADOW E&M-EST. PATIENT-LVL V: CPT | Mod: PBBFAC,,, | Performed by: NURSE PRACTITIONER

## 2025-03-25 NOTE — TELEPHONE ENCOUNTER
Spoke with patient and she is not ready for Cataract Surgery are Jarek wants new glasses and second opinion about her dry eyes

## 2025-03-25 NOTE — PROGRESS NOTES
"  Winnie Ngo presented for a  Medicare AWV and comprehensive Health Risk Assessment today. The following components were reviewed and updated:    Medical history  Family History  Social history  Allergies and Current Medications  Health Risk Assessment  Health Maintenance  Care Team         ** See Completed Assessments for Annual Wellness Visit within the encounter summary.**         The following assessments were completed:  Living Situation  CAGE  Depression Screening  Timed Get Up and Go  Whisper Test  Cognitive Function Screening    Nutrition Screening  ADL Screening  PAQ Screening      Opioid documentation for eAWV      Patient does not have a current opioid prescription.        Review for Substance Use Disorders: Patient does not use substance      Current Medications[1]       Vitals:    03/25/25 0938   BP: 112/72   Pulse: 78   SpO2: 97%   Weight: 67.5 kg (148 lb 13 oz)   Height: 5' 2" (1.575 m)   PainSc: 0-No pain      Physical Exam  Vitals reviewed.   Constitutional:       General: She is not in acute distress.     Appearance: Normal appearance. She is not ill-appearing.   HENT:      Head: Normocephalic and atraumatic.      Mouth/Throat:      Mouth: Mucous membranes are moist.   Eyes:      General: No scleral icterus.        Right eye: No discharge.         Left eye: No discharge.      Extraocular Movements: Extraocular movements intact.      Conjunctiva/sclera: Conjunctivae normal.      Comments: glasses   Cardiovascular:      Rate and Rhythm: Normal rate.   Pulmonary:      Effort: Pulmonary effort is normal. No respiratory distress.   Musculoskeletal:      Cervical back: Normal range of motion.   Skin:     General: Skin is warm and dry.   Neurological:      Mental Status: She is alert and oriented to person, place, and time.   Psychiatric:         Mood and Affect: Mood normal.         Behavior: Behavior normal. Behavior is cooperative.         Cognition and Memory: Cognition and memory normal.           "     Diagnoses and health risks identified today and associated recommendations/orders:    1. Encounter for Medicare annual wellness exam  - Chart reviewed. Problem list updated. Discussed current medical diagnosis, current medications, medical/surgical/family/social history; updated provider list; documented vital signs; identified any cognitive impairment; and updated risk factor list. Addressed any outstanding health maintenance. Provided patient with personalized health advice. Continue to follow up with PCP and any specialists.   - Referral to Enhanced Annual Wellness Visit (eAWV) W+1    2. Dyslipidemia associated with type 2 diabetes mellitus  Chronic; stable on current treatment plan; follow up with PCP  - continue taking statin     3. PVD (peripheral vascular disease)  Chronic; stable on current treatment plan; follow up with PCP  - continue taking statin    4. Benzodiazepine dependence  Chronic; stable on current treatment plan; follow up with PCP  - taking lorazepam    5. Malignant neoplasm of overlapping sites of left breast in female, estrogen receptor positive  Chronic; stable on current treatment plan; follow up with PCP  - follow up with hematology/oncology and breast surgery     6. Diet-controlled diabetes mellitus  Chronic; stable on current treatment plan; follow up with PCP  - diet controlled     7. Panic disorder (episodic paroxysmal anxiety)  Chronic; stable on current treatment plan; follow up with PCP  - continue taking lorazepam     8. Mild persistent asthma without complication  Chronic; stable on current treatment plan; follow up with PCP  - continue taking albuterol and advair inhalers    9. Mild vitamin D deficiency  Chronic; stable on current treatment plan; follow up with PCP  - continue taking vit d supplements     10. Gastroesophageal reflux disease, unspecified whether esophagitis present  Chronic; stable on current treatment plan; follow up with PCP  = continue taking ppi     11.  Osteopenia, unspecified location  Chronic; stable on current treatment plan; follow up with PCP  - continue taking vit d supplements     12. Other insomnia  Chronic; stable on current treatment plan; follow up with PCP  - continue taking melatonin     13. BMI 27.0-27.9,adult  - Recommendation for healthy diet and increasing exercise as tolerated with goal of 150min/week       Provided Winnie with a 5-10 year written screening schedule and personal prevention plan. Recommendations were developed using the USPSTF age appropriate recommendations. Education, counseling, and referrals were provided as needed. After Visit Summary printed and given to patient which includes a list of additional screenings\tests needed.    Follow up in about 1 year (around 3/25/2026) for your next medicare wellness visit.    Yany Bowser, MJ-C    Advance Care Planning     I offered to discuss advanced care planning, including how to pick a person who would make decisions for you if you were unable to make them for yourself, called a health care power of , and what kind of decisions you might make such as use of life sustaining treatments such as ventilators and tube feeding when faced with a life limiting illness recorded on a living will that they will need to know. (How you want to be cared for as you near the end of your natural life)     X  Patient is unwilling to engage in a discussion regarding advance directives at this time.       [1]   Current Outpatient Medications:     albuterol (PROVENTIL/VENTOLIN HFA) 90 mcg/actuation inhaler, Inhale 2 puffs into the lungs every 6 (six) hours as needed for Wheezing. May substitute any albuterol inhaler on her plan., Disp: 18 g, Rfl: 3    atorvastatin (LIPITOR) 10 MG tablet, Take 1 tablet (10 mg total) by mouth every evening., Disp: 90 tablet, Rfl: 2    blood sugar diagnostic (BLOOD GLUCOSE TEST) Strp, One test strip per glucose testing twice a day: Insurance Brand Preference, Disp:  180 strip, Rfl: 3    blood-glucose meter Misc, Dispense one meter: Insurance Brand Preference, Disp: 1 each, Rfl: 0    cetirizine (ZYRTEC) 10 MG tablet, Take 1 tablet (10 mg total) by mouth once daily., Disp: 30 tablet, Rfl: 11    cholecalciferol, vitamin D3, (VITAMIN D3) 25 mcg (1,000 unit) capsule, Take 4,000 Units by mouth once daily. Per Dr. Flores, Disp: , Rfl:     clotrimazole (LOTRIMIN) 1 % cream, Apply topically 2 (two) times daily., Disp: 12 g, Rfl: 1    clotrimazole-betamethasone 1-0.05% (LOTRISONE) cream, Apply topically 2 (two) times daily., Disp: 45 g, Rfl: 0    fluticasone-salmeterol diskus inhaler 100-50 mcg, Inhale 1 puff into the lungs 2 (two) times daily. Controller, Disp: 60 each, Rfl: 11    lancets Misc, 1 each by Misc.(Non-Drug; Combo Route) route once daily., Disp: 100 each, Rfl: 11    LORazepam (ATIVAN) 1 MG tablet, Take 1 tablet (1 mg total) by mouth nightly as needed for Anxiety. TAKE 1 TABLET(1 MG) BY MOUTH EVERY NIGHT AS NEEDED FOR ANXIETY, Disp: 30 tablet, Rfl: 0    melatonin 1 mg Tab, Take 1 tablet by mouth every evening., Disp: , Rfl:     pantoprazole (PROTONIX) 20 MG tablet, Take 1 tablet (20 mg total) by mouth once daily., Disp: 90 tablet, Rfl: 3    fluticasone propionate (FLONASE) 50 mcg/actuation nasal spray, 2 sprays (100 mcg total) by Each Nostril route once daily. (Patient not taking: Reported on 3/25/2025), Disp: 9.9 mL, Rfl: 11

## 2025-03-25 NOTE — PATIENT INSTRUCTIONS
Pedro Pablo Zhou,     If you are due for any health screening(s) below please notify me so we can arrange them to be ordered and scheduled. Most healthy patients at your age complete them, but you are free to accept or refuse.     If you can't do it, I'll definitely understand. If you can, I'd certainly appreciate it!    All of your core healthy metrics are met.                     Counseling and Referral of Other Preventative  (Italic type indicates deductible and co-insurance are waived)    Patient Name: Winnie Ngo  Today's Date: 3/25/2025    Health Maintenance       Date Due Completion Date    TETANUS VACCINE 03/28/2025 (Originally 6/4/2008) 6/4/1998    COVID-19 Vaccine (7 - 2024-25 season) 03/28/2025 (Originally 9/1/2024) 10/14/2022    RSV Vaccine (Age 60+ and Pregnant patients) (1 - 1-dose 75+ series) 03/28/2025 (Originally 2/24/2018) ---    Hemoglobin A1c 05/04/2025 11/4/2024    Diabetes Urine Screening 11/04/2025 11/4/2024    Lipid Panel 11/04/2025 11/4/2024    Diabetic Eye Exam 02/21/2026 2/21/2025    Mammogram 03/03/2026 3/3/2025    Override on 9/14/2011: (N/S)    DEXA Scan 01/26/2027 1/26/2022    Override on 5/11/2011: (N/S)        No orders of the defined types were placed in this encounter.    The following information is provided to all patients.  This information is to help you find resources for any of the problems found today that may be affecting your health:                  Living healthy guide: www.[facility].[place].gov      Understanding Diabetes: www.diabetes.org      Eating healthy: www.cdc.gov/healthyweight      CDC home safety checklist: www.cdc.gov/steadi/patient.html      Agency on Aging: www.goea.[place].gov      Alcoholics anonymous (AA): www.aa.org      Physical Activity: www.jeannine.nih.gov/kz2daqx      Tobacco use: www.quitwithusla.org

## 2025-04-03 DIAGNOSIS — F41.9 ANXIETY: ICD-10-CM

## 2025-04-03 NOTE — TELEPHONE ENCOUNTER
No care due was identified.  [facility] Embedded Care Due Messages. Reference number: [id number].   4/03/2025 6:48:44 AM CDT

## 2025-04-06 RX ORDER — LORAZEPAM 1 MG/1
1 TABLET ORAL NIGHTLY PRN
Qty: 30 TABLET | Refills: 0 | Status: SHIPPED | OUTPATIENT
Start: 2025-04-06

## 2025-04-07 ENCOUNTER — TELEPHONE (OUTPATIENT)
Dept: FAMILY MEDICINE | Facility: CLINIC | Age: 80
End: 2025-04-07
Payer: MEDICARE

## 2025-05-04 DIAGNOSIS — F41.9 ANXIETY: ICD-10-CM

## 2025-05-05 RX ORDER — LORAZEPAM 1 MG/1
1 TABLET ORAL NIGHTLY PRN
Qty: 30 TABLET | Refills: 3 | Status: SHIPPED | OUTPATIENT
Start: 2025-05-05

## 2025-05-07 ENCOUNTER — LAB VISIT (OUTPATIENT)
Dept: LAB | Facility: HOSPITAL | Age: 80
End: 2025-05-07
Attending: INTERNAL MEDICINE
Payer: MEDICARE

## 2025-05-07 DIAGNOSIS — E11.65 TYPE 2 DIABETES MELLITUS WITH HYPERGLYCEMIA, WITHOUT LONG-TERM CURRENT USE OF INSULIN: ICD-10-CM

## 2025-05-07 LAB
ALBUMIN SERPL BCP-MCNC: 4 G/DL (ref 3.5–5.2)
ALP SERPL-CCNC: 74 UNIT/L (ref 40–150)
ALT SERPL W/O P-5'-P-CCNC: 15 UNIT/L (ref 10–44)
ANION GAP (OHS): 6 MMOL/L (ref 8–16)
AST SERPL-CCNC: 20 UNIT/L (ref 11–45)
BILIRUB SERPL-MCNC: 1.8 MG/DL (ref 0.1–1)
BUN SERPL-MCNC: 9 MG/DL (ref 8–23)
CALCIUM SERPL-MCNC: 9.8 MG/DL (ref 8.7–10.5)
CHLORIDE SERPL-SCNC: 105 MMOL/L (ref 95–110)
CHOLEST SERPL-MCNC: 145 MG/DL (ref 120–199)
CHOLEST/HDLC SERPL: 2.9 {RATIO} (ref 2–5)
CO2 SERPL-SCNC: 29 MMOL/L (ref 23–29)
CREAT SERPL-MCNC: 0.7 MG/DL (ref 0.5–1.4)
EAG (OHS): 192 MG/DL (ref 68–131)
GFR SERPLBLD CREATININE-BSD FMLA CKD-EPI: >60 ML/MIN/1.73/M2
GLUCOSE SERPL-MCNC: 175 MG/DL (ref 70–110)
HBA1C MFR BLD: 8.3 % (ref 4–5.6)
HDLC SERPL-MCNC: 50 MG/DL (ref 40–75)
HDLC SERPL: 34.5 % (ref 20–50)
LDLC SERPL CALC-MCNC: 73.2 MG/DL (ref 63–159)
NONHDLC SERPL-MCNC: 95 MG/DL
POTASSIUM SERPL-SCNC: 3.7 MMOL/L (ref 3.5–5.1)
PROT SERPL-MCNC: 7.1 GM/DL (ref 6–8.4)
SODIUM SERPL-SCNC: 140 MMOL/L (ref 136–145)
TRIGL SERPL-MCNC: 109 MG/DL (ref 30–150)

## 2025-05-07 PROCEDURE — 36415 COLL VENOUS BLD VENIPUNCTURE: CPT | Mod: PO

## 2025-05-07 PROCEDURE — 83036 HEMOGLOBIN GLYCOSYLATED A1C: CPT

## 2025-05-07 PROCEDURE — 80053 COMPREHEN METABOLIC PANEL: CPT

## 2025-05-07 PROCEDURE — 80061 LIPID PANEL: CPT

## 2025-05-14 ENCOUNTER — OFFICE VISIT (OUTPATIENT)
Dept: FAMILY MEDICINE | Facility: CLINIC | Age: 80
End: 2025-05-14
Payer: MEDICARE

## 2025-05-14 VITALS
WEIGHT: 148.38 LBS | DIASTOLIC BLOOD PRESSURE: 68 MMHG | SYSTOLIC BLOOD PRESSURE: 122 MMHG | OXYGEN SATURATION: 96 % | BODY MASS INDEX: 27.3 KG/M2 | HEART RATE: 89 BPM | HEIGHT: 62 IN

## 2025-05-14 DIAGNOSIS — Z85.038 HISTORY OF COLON CANCER: ICD-10-CM

## 2025-05-14 DIAGNOSIS — R07.9 CHEST PAIN, UNSPECIFIED TYPE: ICD-10-CM

## 2025-05-14 DIAGNOSIS — E78.5 DYSLIPIDEMIA ASSOCIATED WITH TYPE 2 DIABETES MELLITUS: Primary | ICD-10-CM

## 2025-05-14 DIAGNOSIS — I83.92 VARICOSE VEINS OF LEFT LOWER EXTREMITY, UNSPECIFIED WHETHER COMPLICATED: ICD-10-CM

## 2025-05-14 DIAGNOSIS — Z85.3 HISTORY OF BILATERAL BREAST CANCER: ICD-10-CM

## 2025-05-14 DIAGNOSIS — E11.65 UNCONTROLLED TYPE 2 DIABETES MELLITUS WITH HYPERGLYCEMIA: ICD-10-CM

## 2025-05-14 DIAGNOSIS — Z86.000 HISTORY OF DUCTAL CARCINOMA IN SITU (DCIS) OF BREAST: ICD-10-CM

## 2025-05-14 DIAGNOSIS — E11.69 DYSLIPIDEMIA ASSOCIATED WITH TYPE 2 DIABETES MELLITUS: Primary | ICD-10-CM

## 2025-05-14 DIAGNOSIS — R94.8 ABNORMAL GASTROINTESTINAL PET SCAN: ICD-10-CM

## 2025-05-14 PROCEDURE — 3288F FALL RISK ASSESSMENT DOCD: CPT | Mod: CPTII,S$GLB,, | Performed by: INTERNAL MEDICINE

## 2025-05-14 PROCEDURE — 1101F PT FALLS ASSESS-DOCD LE1/YR: CPT | Mod: CPTII,S$GLB,, | Performed by: INTERNAL MEDICINE

## 2025-05-14 PROCEDURE — 3074F SYST BP LT 130 MM HG: CPT | Mod: CPTII,S$GLB,, | Performed by: INTERNAL MEDICINE

## 2025-05-14 PROCEDURE — 1126F AMNT PAIN NOTED NONE PRSNT: CPT | Mod: CPTII,S$GLB,, | Performed by: INTERNAL MEDICINE

## 2025-05-14 PROCEDURE — 1159F MED LIST DOCD IN RCRD: CPT | Mod: CPTII,S$GLB,, | Performed by: INTERNAL MEDICINE

## 2025-05-14 PROCEDURE — 99215 OFFICE O/P EST HI 40 MIN: CPT | Mod: S$GLB,,, | Performed by: INTERNAL MEDICINE

## 2025-05-14 PROCEDURE — 1160F RVW MEDS BY RX/DR IN RCRD: CPT | Mod: CPTII,S$GLB,, | Performed by: INTERNAL MEDICINE

## 2025-05-14 PROCEDURE — 3078F DIAST BP <80 MM HG: CPT | Mod: CPTII,S$GLB,, | Performed by: INTERNAL MEDICINE

## 2025-05-14 PROCEDURE — 99999 PR PBB SHADOW E&M-EST. PATIENT-LVL IV: CPT | Mod: PBBFAC,,, | Performed by: INTERNAL MEDICINE

## 2025-05-19 NOTE — PROGRESS NOTES
Patient ID: Winnie Ngo is a 80 y.o. female    Chief Complaint: Follow-up (6 months f/u)    History of Present Illness    CHIEF COMPLAINT:  Patient presents for a diabetes follow-up visit and to discuss concerns about various health issues, including abnormal labs and chest pain.    HPI:  Patient reports increasing A1C level despite efforts to reduce sugar intake, with recent values of 6.9, 7.9, and 8.3. She expresses concern about elevated bilirubin levels, describing a burning sensation and yellow discoloration at the end of her bowel movements, which she attributes to bile. She also mentions chest pain that are relieved by eructation. Patient reports a family history of bile duct cancer, with her oldest brother having  from it, contributing to her anxiety about her symptoms.    Her previous EKGs or ECGs have been reported as abnormal, though the reasons were never explained to her. Her previous cardiologist left [facility], and she has not found a new one yet.    She reports difficulty with previous colonoscopy preparations, with projectile emesis and fecal incontinence, which has led her to avoid further colonoscopies. She expresses a desire for a full-body PET scan due to concerns about cancer, mentioning that her second brother recently  from widespread cancer.    She mentions varicose veins in her left leg, which a podiatrist had previously noticed and recommended compression stockings for. She reports nodules under the skin of her right hand, which have increased from 1 to 3, though they are not causing her pain.    She denies any side effects from current medications and peripheral artery disease.    MEDICATIONS:  Patient is on Protonix, taken every morning for heartburn and indigestion.    MEDICAL HISTORY:  Patient has a history of Type 2 diabetes, colon cancer ( or ), breast cancer (2016), hiatal hernia (2016), stomach polyps (2016), and varicose veins in her left leg.    FAMILY  HISTORY:  Family history is significant for her brother who was recently diagnosed with diabetes and had an adverse reaction to Metformin. Her oldest brother  from bile duct cancer. Her second brother recently  from cancer that started as a tumor in his leg, spread to his lungs, and was eventually diagnosed as renal cancer.    SURGICAL HISTORY:  She underwent gallbladder removal prior to , colon cancer surgery in  or , and breast cancer surgery in 2016.    TEST RESULTS:  Her recent A1C was 8.3%, which has increased from previous results of 7.9% and 6.9%. Patient's total bilirubin was also recently elevated. Patient has undergone EKG/ECG on multiple occasions, with abnormal results. She had a stress echo in , and the results were okay.    IMAGING:  In , the patient underwent a PET scan that showed issues with the stomach, leading to an endoscopy. She also had an endoscopic ultrasound and a colonoscopy in 2016, but the colonoscopy was inconclusive due to inadequate preparation. An EGD (Esophagogastroduodenoscopy) in 2016 revealed a small hiatal hernia and stomach polyps, which were biopsied.          ROS: Otherwise Negative     Physical Exam    General: Well-appearing. Well-nourished. No distress.  HEENT: Conjunctivae normal. Pupils are equal and reactive to light. TMs are clear and intact bilaterally. Hearing grossly normal. Nasopharynx clear. Oropharynx clear.  Neck: Supple. No thyromegaly. No bruits.  Lymph: No cervical adenopathy. No supraclavicular adenopathy.  Heart: Regular rate and rhythm. No murmur. No rub. No gallop.  Lungs: Clear to auscultation. Respiratory effort normal.  Abdomen: Soft. Nontender. Nondistended. Normoactive bowel sounds. No hepatomegaly. No masses.  Extremities: Good distal pulses. No edema.  Psych: Oriented to time person place. Judgment unimpaired. Insight unimpaired. Mood appropriate. Affect appropriate.  Vitals: Blood pressure is 122/68.          Assessment  & Plan    TYPE 2 DIABETES MELLITUS:  - Monitored A1C level (6.9, 7.9, and 8.3) and blood pressure (122/68 today).  - Addressed patient's concerns about Metformin based on others' experiences.  - Prescribed Januvia 1 tablet daily with food due to healthy renal function and lower risk of side effects.    VARICOSE VEINS:  - Clarified that patient has venous disease (varicose veins), not peripheral artery disease.  - Educated patient on varicose veins and spider veins being similar conditions with different visibility levels.  - Recommend wearing compression stockings or diabetic socks for management.    GASTROESOPHAGEAL REFLUX DISEASE:  - Prescribed Pepcid at night and continued Protonix daily in the morning for symptom management.    SKIN DISORDERS:  - Advised patient to leave nodules under the skin alone as they are not causing pain or increasing in size.  - Instructed to contact office if hand nodules increase in size.    ABNORMAL ELECTROCARDIOGRAM:  - Noted patient reports every ECG is abnormal without explanation.    CHEST PAIN:  - Ordered stress echocardiogram to evaluate chest pain, despite normal results 3 years ago.    HISTORY OF COLON CANCER:  - Assessed history of colon cancer with surgery in 1999 or 2000.  - Ordered PET scan to evaluate for cancer metastasis due to history of colon and breast cancer, and previous abnormal PET findings.  - Patient to await call from nurse Ring to schedule PET scan once approved.    HISTORY OF BREAST CANCER:  - Noted breast cancer surgery in 1999 or 2000.  - PET scan ordered as noted above to evaluate for potential metastasis.    HISTORY OF CHOLECYSTECTOMY:  - Documented cholecystectomy performed over a decade ago, likely before 2003.    FAMILY HISTORY OF DIABETES:  - Noted patient's brother was recently diagnosed with diabetes and experienced adverse effects from Metformin.    OTHER:  - Reviewed past medical records indicating stomach polyps were biopsied in 2016.  -  Explained difference between total bilirubin and direct bilirubin.    FOLLOW-UP:  - Follow up in 3 months.            Follow up in about 3 months (around 8/14/2025) for diabetes cmp hba1c lipids.    Winnie was seen today for follow-up.    Diagnoses and all orders for this visit:    Dyslipidemia associated with type 2 diabetes mellitus  -     SITagliptin phosphate (JANUVIA) 50 MG Tab; Take 1 tablet (50 mg total) by mouth once daily.  -     Comprehensive Metabolic Panel; Standing  -     Hemoglobin A1C; Standing  -     Lipid Panel; Standing    History of colon cancer  -     NM PET CT FDG Skull Base to Mid Thigh; Future  -     Cologuard Screening (Multitarget Stool DNA); Future  -     Cologuard Screening (Multitarget Stool DNA)    History of bilateral breast cancer  -     NM PET CT FDG Skull Base to Mid Thigh; Future    Abnormal gastrointestinal PET scan  -     NM PET CT FDG Skull Base to Mid Thigh; Future    Chest pain, unspecified type  -     Stress Echo Which stress agent will be used? Pharmacological; Color Flow Doppler? No; Future    Uncontrolled type 2 diabetes mellitus with hyperglycemia  -     SITagliptin phosphate (JANUVIA) 50 MG Tab; Take 1 tablet (50 mg total) by mouth once daily.  -     Comprehensive Metabolic Panel; Standing  -     Hemoglobin A1C; Standing  -     Lipid Panel; Standing    History of ductal carcinoma in situ (DCIS) of breast    Varicose veins of left lower extremity, unspecified whether complicated         This note was generated with the assistance of ambient listening technology. Verbal consent was obtained by the patient and accompanying visitor(s) for the recording of patient appointment to facilitate this note. I attest to having reviewed and edited the generated note for accuracy, though some syntax or spelling errors may persist. Please contact the author of this note for any clarification.

## 2025-05-20 ENCOUNTER — PATIENT MESSAGE (OUTPATIENT)
Dept: FAMILY MEDICINE | Facility: CLINIC | Age: 80
End: 2025-05-20
Payer: MEDICARE

## 2025-05-30 ENCOUNTER — TELEPHONE (OUTPATIENT)
Dept: FAMILY MEDICINE | Facility: CLINIC | Age: 80
End: 2025-05-30
Payer: MEDICARE

## 2025-05-30 NOTE — TELEPHONE ENCOUNTER
Spoke to [employer] for Cologuard, they are requesting another diagnosis code , pt with hx of colon cancer, informed them pt's Dx is Hx of colon cancer, not a routine colon cancer screening, , they will have to get prior auth from pt's insurance which it will possibly not be covered, pt informed, pt states she is not doing the colonoscopy and not paying for the Cologuard

## 2025-05-30 NOTE — TELEPHONE ENCOUNTER
----- Message from Jw sent at 5/30/2025  9:37 AM CDT -----  Contact: [employer]  .Type:  Needs Medical AdviceWho Called:  [employer] [facility]Would the patient rather a call back or a response via [facility]?  Call Layla Call Back Number: [phone] Information:  [employer] is calling regarding the order color guard for the pt.   They need a diagnosis code to send the order usually the insurance company uses the code Z12.11 or Z12.12.  They can not ship the kit without the code this is their second request.  References # [id number]  Fax # [phone]

## 2025-06-03 ENCOUNTER — HOSPITAL ENCOUNTER (OUTPATIENT)
Dept: RADIOLOGY | Facility: HOSPITAL | Age: 80
Discharge: HOME | End: 2025-06-03
Attending: INTERNAL MEDICINE
Payer: MEDICARE

## 2025-06-03 DIAGNOSIS — Z85.038 HISTORY OF COLON CANCER: ICD-10-CM

## 2025-06-03 DIAGNOSIS — R94.8 ABNORMAL GASTROINTESTINAL PET SCAN: ICD-10-CM

## 2025-06-03 DIAGNOSIS — Z85.3 HISTORY OF BILATERAL BREAST CANCER: ICD-10-CM

## 2025-06-03 LAB — POCT GLUCOSE: 160 MG/DL (ref 70–110)

## 2025-06-03 PROCEDURE — A9552 F18 FDG: HCPCS | Performed by: INTERNAL MEDICINE

## 2025-06-03 PROCEDURE — 78815 PET IMAGE W/CT SKULL-THIGH: CPT | Mod: 26,PS,, | Performed by: STUDENT IN AN ORGANIZED HEALTH CARE EDUCATION/TRAINING PROGRAM

## 2025-06-03 PROCEDURE — 78815 PET IMAGE W/CT SKULL-THIGH: CPT | Mod: TC

## 2025-06-03 RX ORDER — FLUDEOXYGLUCOSE F18 500 MCI/ML
13.75 INJECTION INTRAVENOUS
Status: COMPLETED | OUTPATIENT
Start: 2025-06-03 | End: 2025-06-03

## 2025-06-03 RX ADMIN — FLUDEOXYGLUCOSE F-18 13.75 MILLICURIE: 500 INJECTION INTRAVENOUS at 09:06

## 2025-06-04 ENCOUNTER — OFFICE VISIT (OUTPATIENT)
Dept: OPTOMETRY | Facility: CLINIC | Age: 80
End: 2025-06-04
Payer: COMMERCIAL

## 2025-06-04 DIAGNOSIS — H25.13 NUCLEAR SCLEROSIS, BILATERAL: ICD-10-CM

## 2025-06-04 DIAGNOSIS — H16.223 KERATOCONJUNCTIVITIS SICCA OF BOTH EYES NOT SPECIFIED AS SJOGREN'S: ICD-10-CM

## 2025-06-04 DIAGNOSIS — H26.9 CATARACT OF BOTH EYES, UNSPECIFIED CATARACT TYPE: ICD-10-CM

## 2025-06-04 DIAGNOSIS — E11.9 TYPE 2 DIABETES MELLITUS WITHOUT RETINOPATHY: Primary | ICD-10-CM

## 2025-06-04 PROCEDURE — 99999 PR PBB SHADOW E&M-EST. PATIENT-LVL III: CPT | Mod: PBBFAC,,, | Performed by: OPTOMETRIST

## 2025-06-04 PROCEDURE — 99213 OFFICE O/P EST LOW 20 MIN: CPT | Mod: S$GLB,,, | Performed by: OPTOMETRIST

## 2025-06-04 PROCEDURE — 92015 DETERMINE REFRACTIVE STATE: CPT | Mod: S$GLB,,, | Performed by: OPTOMETRIST

## 2025-06-20 ENCOUNTER — RESULTS FOLLOW-UP (OUTPATIENT)
Dept: FAMILY MEDICINE | Facility: CLINIC | Age: 80
End: 2025-06-20

## 2025-07-15 ENCOUNTER — CLINICAL SUPPORT (OUTPATIENT)
Dept: AUDIOLOGY | Facility: CLINIC | Age: 80
End: 2025-07-15
Payer: MEDICARE

## 2025-07-15 DIAGNOSIS — H93.13 TINNITUS OF BOTH EARS: ICD-10-CM

## 2025-07-15 DIAGNOSIS — H90.A22 SENSORINEURAL HEARING LOSS (SNHL) OF LEFT EAR WITH RESTRICTED HEARING OF RIGHT EAR: Primary | ICD-10-CM

## 2025-07-15 PROCEDURE — 92557 COMPREHENSIVE HEARING TEST: CPT | Mod: S$GLB,,, | Performed by: AUDIOLOGIST

## 2025-07-15 PROCEDURE — 92567 TYMPANOMETRY: CPT | Mod: S$GLB,,, | Performed by: AUDIOLOGIST

## 2025-07-15 NOTE — Clinical Note
Hey! I'm trying to send this to Fermin but cannot find her name in the system. This patient had a significant shift in hearing since December. I retested her since she is getting hearing aids and her audio was over 6 months. Can you please help?  Thank you! Jacqueline

## 2025-07-15 NOTE — PROGRESS NOTES
HEARING AID CONSULTATION    Winnie Ngo was seen today for a hearing aid consultation. We reviewed her hearing test results and discussed different levels of technology of hearing aids. Mrs. Ngo was informed of all pricing and service options available through [facility] ([employer]). She was also advised to verify what, if any, discounts or benefits are available with her insurance. Mrs. Ngo understood that [facility] is not in network with any insurance payor. Mrs. Ngo has an [facility] [employer] flex card with a $2,000.00 hearing allowance.    Mrs. Ngo was informed of the non-refundable $250.00 deposit required to order and that the remaining balance is due the day of .     Patient's listening goals are to be able to hear one-on-one conversation, hear in Sikh and to hear at the grocery store. Mrs. Ngo reported that when her family gets together it is at most 9 people. She did not feel she needs a hearing aid to handle background noise. We discussed other benefits of more sophisticated technology for fine tuning. She was reminded of the 30 day trial available to her if she wishes to upgrade.     Oticon Intent 4 hearing aids were selected in silver gray; patient measured for a length 2  in her right ear and a length 3  in her left ear. ENT medical clearance is needed prior to fitting due to change in hearing sensitivity since her last audiogram in December. Patient was scheduled for her fitting for August 14th.

## 2025-07-15 NOTE — PROGRESS NOTES
Winnie Ngo was seen today in the clinic for an audiologic evaluation to purchase hearing aids.  Mrs. Ngo reported no perceived change in hearing. She noted that her tinnitus fluctuates in pitch and volume and is mostly bothersome when she is trying to fall asleep. She denied any trouble with the tinnitus once she is asleep. Mrs. Ngo reported that she is able to ignore the tinnitus when watching television/having a conversation. She was seen today for a hearing aid consultation and needed an updated audiogram. Patient's last audiogram was on 12/03/2024 that revealed a mild to profound sloping sensorineural hearing loss in the left ear from 1k-8k Hz and a moderate to profound sensorineural hearing loss in the right ear from 2k-8k Hz.     Otoscopy revealed clear EAC's with visible TM's in both ears.    Tympanometry revealed Type A in the right ear and Type A in the left ear.     Audiogram results revealed normal hearing 250-1500 Hz steeply sloping from a mild to severe sensorineural hearing loss (SNHL) 0475-4287 Hz in the right ear and a mild to severe SNHL in the left ear. There is a significant change in hearing since her last audiogram.    Speech reception thresholds were noted at 25 dB in the right ear and 30 dB in the left ear.    Speech discrimination scores were 88% in the right ear and 76% in the left ear. Binaural speech discrimination was 88% at 75 dB. PB Rollover was negative in the left ear. Initial testing was completed at 70 dB instead of 85 dB and patient only scored 56%.    Results were reviewed with patient following testing. Binaural hearing aids were recommended. Patient's results will be sent to ENT to review as her hearing has had a significant change 750-1500 Hz.     Recommendations:  Otologic evaluation for medical clearance  Annual audiogram  Hearing protection when in noise  Hearing aid consultation; needs medical clearance prior to fitting  Sound machine (like white noise) to  soothe tinnitus in quiet

## 2025-07-17 DIAGNOSIS — J45.30 MILD PERSISTENT ASTHMA WITHOUT COMPLICATION: ICD-10-CM

## 2025-07-17 RX ORDER — FLUTICASONE PROPIONATE AND SALMETEROL 100; 50 UG/1; UG/1
1 POWDER RESPIRATORY (INHALATION) 2 TIMES DAILY
Qty: 180 EACH | Refills: 3 | Status: SHIPPED | OUTPATIENT
Start: 2025-07-17

## 2025-07-17 NOTE — TELEPHONE ENCOUNTER
No care due was identified.  [facility] Embedded Care Due Messages. Reference number: [id number].   7/17/2025 2:36:34 PM CDT

## 2025-07-18 ENCOUNTER — PATIENT MESSAGE (OUTPATIENT)
Dept: OTOLARYNGOLOGY | Facility: CLINIC | Age: 80
End: 2025-07-18
Payer: MEDICARE

## 2025-07-18 ENCOUNTER — TELEPHONE (OUTPATIENT)
Dept: OTOLARYNGOLOGY | Facility: CLINIC | Age: 80
End: 2025-07-18
Payer: MEDICARE

## 2025-07-21 ENCOUNTER — PATIENT MESSAGE (OUTPATIENT)
Dept: FAMILY MEDICINE | Facility: CLINIC | Age: 80
End: 2025-07-21
Payer: MEDICARE

## 2025-07-21 ENCOUNTER — DOCUMENTATION ONLY (OUTPATIENT)
Dept: AUDIOLOGY | Facility: CLINIC | Age: 80
End: 2025-07-21
Payer: MEDICARE

## 2025-07-21 NOTE — PROGRESS NOTES
Hearing aids arrived from ; devices were assembled and charged in preparation for fitting. Patient is scheduled for a fitting on 8/14/2025.    Hearing Aid Information   and Model: Oticon Intent 4 miniRITE  Color: Silver Grey  Right SN: [id number]  Left SN: [id number]  Battery: Li-Ion Rechargeable 13  Right  and Dome: 2 85dB  Left  and Dome: 3 85 dB  Repair Warranty Expiration Date: 08/14/28  L&D Warranty Expiration Date: 08/14/28   SN: [id number]

## 2025-07-22 ENCOUNTER — OFFICE VISIT (OUTPATIENT)
Dept: OTOLARYNGOLOGY | Facility: CLINIC | Age: 80
End: 2025-07-22
Payer: MEDICARE

## 2025-07-22 ENCOUNTER — TELEPHONE (OUTPATIENT)
Dept: FAMILY MEDICINE | Facility: CLINIC | Age: 80
End: 2025-07-22
Payer: MEDICARE

## 2025-07-22 VITALS
BODY MASS INDEX: 26.78 KG/M2 | DIASTOLIC BLOOD PRESSURE: 73 MMHG | WEIGHT: 145.5 LBS | HEART RATE: 77 BPM | HEIGHT: 62 IN | SYSTOLIC BLOOD PRESSURE: 124 MMHG

## 2025-07-22 DIAGNOSIS — H90.3 ASYMMETRIC SNHL (SENSORINEURAL HEARING LOSS): Primary | Chronic | ICD-10-CM

## 2025-07-22 DIAGNOSIS — H93.13 TINNITUS AURIUM, BILATERAL: Chronic | ICD-10-CM

## 2025-07-22 DIAGNOSIS — K14.8 TONGUE LESION: Chronic | ICD-10-CM

## 2025-07-22 PROCEDURE — 99999 PR PBB SHADOW E&M-EST. PATIENT-LVL III: CPT | Mod: PBBFAC,,, | Performed by: OTOLARYNGOLOGY

## 2025-07-22 PROCEDURE — 3288F FALL RISK ASSESSMENT DOCD: CPT | Mod: CPTII,S$GLB,, | Performed by: OTOLARYNGOLOGY

## 2025-07-22 PROCEDURE — 1159F MED LIST DOCD IN RCRD: CPT | Mod: CPTII,S$GLB,, | Performed by: OTOLARYNGOLOGY

## 2025-07-22 PROCEDURE — 3074F SYST BP LT 130 MM HG: CPT | Mod: CPTII,S$GLB,, | Performed by: OTOLARYNGOLOGY

## 2025-07-22 PROCEDURE — 3078F DIAST BP <80 MM HG: CPT | Mod: CPTII,S$GLB,, | Performed by: OTOLARYNGOLOGY

## 2025-07-22 PROCEDURE — 99214 OFFICE O/P EST MOD 30 MIN: CPT | Mod: S$GLB,,, | Performed by: OTOLARYNGOLOGY

## 2025-07-22 PROCEDURE — 1101F PT FALLS ASSESS-DOCD LE1/YR: CPT | Mod: CPTII,S$GLB,, | Performed by: OTOLARYNGOLOGY

## 2025-07-22 PROCEDURE — 1126F AMNT PAIN NOTED NONE PRSNT: CPT | Mod: CPTII,S$GLB,, | Performed by: OTOLARYNGOLOGY

## 2025-07-22 NOTE — PROGRESS NOTES
Chief Complaint   Patient presents with    Follow-up     Pt stated that she has a change in hearing of the left ear    .     HPI:  Winnie Ngo is a very pleasant 80 y.o. female here to see me today for the first time for evaluation of hearing loss.  She reports hearing loss that has been gradually progressing over the last several years.  She has not noted any difference in hearing between the ears, with either ear being the worse hearing ear.  She has noted  tinnitus in both ears.  She has not had any recent issues with ear pain or ear drainage.  She has not had any previous otologic surgery.  She denies any history of significant loud noise exposure. She reports that she has had a 1 day history of nasal congestion, sore throat, and facial pressure. She does not think she has had fever. She has been taking zytec intermittently.     History of Present Illness  7/22/2024:  CHIEF COMPLAINT:  - Patient presents for follow-up regarding hearing loss and to discuss recent audiologist findings.    HPI:  Patient recently underwent an updated hearing test with an audiologist named Jacqueline. The test results showed a slight worsening in hearing, particularly in the left ear, although she had not subjectively noticed this change. She reports long-standing severe tinnitus. While able to hear people speaking, she sometimes has difficulty understanding speech if words are not enunciated clearly. She also mentions occasionally feeling like her right ear is occluded, describing it as a sensation of being in an enclosed space.    She reports a growth on her tongue that has been present for approximately 10 years. She expressed concern about this growth due to a friend's history of tongue cancer.    She denies noticing worsening in hearing or any new ringing or buzzing sounds in her ears.           Past Medical History:   Diagnosis Date    Allergy     Anxiety     Asthma     Benign neoplasm of colon     BRCA1 negative     BRCA2  negative     Breast cancer     Cancer     colon    Cataract     Chronic instability of knee, unspecified knee     rt knee cap displaced    Colon cancer     COPD (chronic obstructive pulmonary disease)     Diabetes mellitus     Dry eye syndrome     Fever blister     GERD (gastroesophageal reflux disease)     Hyperlipidemia     Joint pain     Malignant neoplasm of overlapping sites of left breast in female, estrogen receptor positive 03/08/2023    Osteopenia     Personal history of malignant neoplasm of large intestine     PONV (postoperative nausea and vomiting)     Stiffness due to immobility 05/08/2023    Surgery, elective:Hysterectomy oophorectomy  09/16/2012    Weakness 05/08/2023     Social History     Socioeconomic History    Marital status:    Tobacco Use    Smoking status: Never     Passive exposure: Past    Smokeless tobacco: Never    Tobacco comments:     Childhood exposure to in house smoking    Substance and Sexual Activity    Alcohol use: No    Drug use: Never    Sexual activity: Never   Other Topics Concern    Are you pregnant or think you may be? No    Breast-feeding No     Social Drivers of Health     Financial Resource Strain: Low Risk  (5/13/2025)    Overall Financial Resource Strain (CARDIA)     Difficulty of Paying Living Expenses: Not very hard   Food Insecurity: No Food Insecurity (5/13/2025)    Hunger Vital Sign     Worried About Running Out of Food in the Last Year: Never true     Ran Out of Food in the Last Year: Never true   Transportation Needs: No Transportation Needs (5/13/2025)    PRAPARE - Transportation     Lack of Transportation (Medical): No     Lack of Transportation (Non-Medical): No   Physical Activity: Inactive (5/13/2025)    Exercise Vital Sign     Days of Exercise per Week: 0 days     Minutes of Exercise per Session: 0 min   Stress: Stress Concern Present (5/13/2025)    [employer] - Occupational Stress Questionnaire     Feeling of Stress :  To some extent   Housing Stability: Low Risk  (5/13/2025)    Housing Stability Vital Sign     Unable to Pay for Housing in the Last Year: No     Number of Times Moved in the Last Year: 0     Homeless in the Last Year: No     Past Surgical History:   Procedure Laterality Date    APPENDECTOMY      AXILLARY NODE DISSECTION Left 03/22/2023    Procedure: LYMPHADENECTOMY, AXILLARY-left;  Surgeon: Yokasta Cabrera MD;  Location: [facility];  Service: General;  Laterality: Left;    BREAST BIOPSY Right 1980's    exc bx     BREAST BIOPSY Left 2016    lumpectomy    BREAST BIOPSY Right 2021    BREAST LUMPECTOMY Left 2016    CHOLECYSTECTOMY      COLON SURGERY      stage T1 N0 M0    EYE SURGERY      HYSTERECTOMY      INJECTION FOR SENTINEL NODE IDENTIFICATION Left 03/22/2023    Procedure: INJECTION, FOR SENTINEL NODE IDENTIFICATION-left;  Surgeon: Yokasta Cabrera MD;  Location: [facility] OR;  Service: General;  Laterality: Left;  INJECTION     KNEE SURGERY      arthroscopic    MASTECTOMY Left 03/22/2023    Procedure: MASTECTOMY-left;  Surgeon: Yokasta Cabrera MD;  Location: [facility];  Service: General;  Laterality: Left;  3 hours    NASAL SEPTUM SURGERY      with removal of polyps    SENTINEL LYMPH NODE BIOPSY Left 03/22/2023    Procedure: BIOPSY, LYMPH NODE, SENTINEL-left;  Surgeon: Yokasta Cabrera MD;  Location: [facility];  Service: General;  Laterality: Left;    sigmoid colectomy      SINUS SURGERY      TOTAL ABDOMINAL HYSTERECTOMY W/ BILATERAL SALPINGOOPHORECTOMY      UPPER ENDOSCOPY W/ BANDING  2004    Schatzki's ring dialated 2 sessile polyps in stomach     Family History   Problem Relation Name Age of Onset    Alzheimer's disease Mother      Kidney disease Mother          kidney stone    Heart disease Father  41        MI    Stroke Father      Cancer Brother          leg    Kidney cancer Brother      Arrhythmia Brother      Cancer Brother          Kidney cancer    Cancer Daughter x1     Breast cancer Daughter x1     Diabetes Son x1      Arthritis Son x1     Cancer Maternal Uncle      Melanoma Neg Hx           Review of Systems  General: negative for chills, fever or weight loss  Psychological: negative for mood changes or depression  Ophthalmic: negative for blurry vision, photophobia or eye pain  ENT: see HPI  Respiratory: no cough, shortness of breath, or wheezing  Cardiovascular: no chest pain or dyspnea on exertion  Gastrointestinal: no abdominal pain, change in bowel habits, or black/ bloody stools  Musculoskeletal: negative for gait disturbance or muscular weakness  Neurological: no syncope or seizures; no ataxia  Dermatological: negative for puritis,  rash and jaundice  Hematologic/lymphatic: no easy bruising, no new lumps or bumps      Physical Exam:    Vitals:    07/22/25 1100   BP: 124/73   Pulse: 77         Constitutional: Well appearing / communicating without difficutly.  NAD.  Eyes: EOM I Bilaterally  Head/Face: Normocephalic.  Negative paranasal sinus pressure/tenderness.  Salivary glands WNL.  House Brackmann I Bilaterally.    Right Ear: Auricle normal appearance. External Auditory Canal within normal limits no lesions or masses,TM w/o masses/lesions/perforations. TM mobility noted.   Left Ear: Auricle normal appearance. External Auditory Canal within normal limits no lesions or masses,TM w/o masses/lesions/perforations. TM mobility noted.  Rinne Air conduction >bone conduction bilaterally, Mixon midline.   Nose: No gross nasal septal deviation. Inferior Turbinates 3+ bilaterally. No septal perforation. No masses/lesions. External nasal skin appears normal without masses/lesions.  Oral Cavity: Gingiva/lips within normal limits.  Dentition/gingiva healthy appearing. Mucus membranes moist. Floor of mouth soft, no masses palpated. Oral Tongue mobile. 3mm white/pink fleshy well circumscribed nodule on right lateral surface of the tongue; non-ulcerated; mobile, soft.  Hard Palate appears normal.    Oropharynx: Base of tongue appears  normal. No masses/lesions noted. Tonsillar fossa/pharyngeal wall without lesions. Posterior oropharynx WNL.  Soft palate without masses. Midline uvula.   Neck/Lymphatic: No LAD I-VI bilaterally.  No thyromegaly.  No masses noted on exam.      Diagnostic studies:  Audiogram interpreted personally by me and discussed in detail with the patient today. Audiogram results revealed normal hearing 250-1500 Hz steeply sloping from a mild to severe sensorineural hearing loss (SNHL) 2766-6935 Hz in the right ear and a mild to severe SNHL in the left ear. There is a significant change in hearing since her last audiogram.                 Mild to profound SNHL bilaterally; tympanometry shows type A tympanograms bilaterally.       Assessment:    ICD-10-CM ICD-9-CM    1. Asymmetric SNHL (sensorineural hearing loss)  H90.3 389.16 MRI IAC/Temporal Bones W W/O Contrast      2. Tinnitus aurium, bilateral  H93.13 388.31       3. Tongue lesion  K14.8 529.8           The primary encounter diagnosis was Asymmetric SNHL (sensorineural hearing loss). Diagnoses of Tinnitus aurium, bilateral and Tongue lesion were also pertinent to this visit.      Plan:  Orders Placed This Encounter   Procedures    MRI IAC/Temporal Bones W W/O Contrast     We reviewed the patient's recent audiogram and hearing loss in detail.  We discussed change in hearing in the left ear since her last audiogram in December.  Discussed that this may indicate pathology on the hearing nerve but also can be due to nodule progression.  Discussed option of MRI IAC with contrast to further evaluate for retrocochlear lesions.   Continue binaural hearing aids.    We also discussed the use hearing protection when exposed to loud noise, including lawn equipment. Recommend audiogram in 1 year.    Continue tinnitus management strategies. Some patients find that restricting the salt or caffeine in their diet helps.  Tinnitus tends to be louder in times of stress and fatigue, and may  decrease with time.  Sound machines may also be an effective masking technique if needed at night.    Continue Flonase 2 sprays per nostril daily  Continue nasal saline 1-2 times daily  Continue zytec    - Evaluated lesion on patient's tongue, clinically assessing it as likely a benign fibroma, possibly due to past trauma or repetitive rubbing against teeth.  - Discussed options for tongue lesion management, including clinical observation vs. excision and biopsy, explaining its likely benign nature. She has elected to clinically observe.   - Follow up for continued clinical observation of tongue lesion during future hearing check-ups.         Thank you kindly for allowing me to participate in the patient's care.       Elysia Vargas MD    This note was generated with the assistance of ambient listening technology. Verbal consent was obtained by the patient and accompanying visitor(s) for the recording of patient appointment to facilitate this note. I attest to having reviewed and edited the generated note for accuracy, though some syntax or spelling errors may persist. Please contact the author of this note for any clarification.

## 2025-07-30 ENCOUNTER — HOSPITAL ENCOUNTER (OUTPATIENT)
Dept: RADIOLOGY | Facility: HOSPITAL | Age: 80
Discharge: HOME | End: 2025-07-30
Attending: OTOLARYNGOLOGY
Payer: MEDICARE

## 2025-07-30 DIAGNOSIS — H90.3 ASYMMETRIC SNHL (SENSORINEURAL HEARING LOSS): Chronic | ICD-10-CM

## 2025-07-30 PROCEDURE — 70553 MRI BRAIN STEM W/O & W/DYE: CPT | Mod: 26,,, | Performed by: STUDENT IN AN ORGANIZED HEALTH CARE EDUCATION/TRAINING PROGRAM

## 2025-07-30 PROCEDURE — A9585 GADOBUTROL INJECTION: HCPCS | Performed by: OTOLARYNGOLOGY

## 2025-07-30 PROCEDURE — 25500020 PHARM REV CODE 255: Performed by: OTOLARYNGOLOGY

## 2025-07-30 PROCEDURE — 70553 MRI BRAIN STEM W/O & W/DYE: CPT | Mod: TC

## 2025-07-30 RX ORDER — GADOBUTROL 604.72 MG/ML
6 INJECTION INTRAVENOUS
Status: COMPLETED | OUTPATIENT
Start: 2025-07-30 | End: 2025-07-30

## 2025-07-30 RX ADMIN — GADOBUTROL 6 ML: 604.72 INJECTION INTRAVENOUS at 09:07

## 2025-08-08 ENCOUNTER — LAB VISIT (OUTPATIENT)
Dept: LAB | Facility: HOSPITAL | Age: 80
End: 2025-08-08
Attending: INTERNAL MEDICINE
Payer: MEDICARE

## 2025-08-08 DIAGNOSIS — E11.65 UNCONTROLLED TYPE 2 DIABETES MELLITUS WITH HYPERGLYCEMIA: ICD-10-CM

## 2025-08-08 DIAGNOSIS — E78.5 DYSLIPIDEMIA ASSOCIATED WITH TYPE 2 DIABETES MELLITUS: ICD-10-CM

## 2025-08-08 DIAGNOSIS — E11.69 DYSLIPIDEMIA ASSOCIATED WITH TYPE 2 DIABETES MELLITUS: ICD-10-CM

## 2025-08-08 LAB
ALBUMIN SERPL BCP-MCNC: 4.1 G/DL (ref 3.5–5.2)
ALP SERPL-CCNC: 69 UNIT/L (ref 40–150)
ALT SERPL W/O P-5'-P-CCNC: 16 UNIT/L (ref 0–55)
ANION GAP (OHS): 13 MMOL/L (ref 8–16)
AST SERPL-CCNC: 27 UNIT/L (ref 0–50)
BILIRUB SERPL-MCNC: 1.8 MG/DL (ref 0.1–1)
BUN SERPL-MCNC: 12 MG/DL (ref 8–23)
CALCIUM SERPL-MCNC: 9.6 MG/DL (ref 8.7–10.5)
CHLORIDE SERPL-SCNC: 106 MMOL/L (ref 95–110)
CHOLEST SERPL-MCNC: 154 MG/DL (ref 120–199)
CHOLEST/HDLC SERPL: 3.1 {RATIO} (ref 2–5)
CO2 SERPL-SCNC: 24 MMOL/L (ref 23–29)
CREAT SERPL-MCNC: 0.7 MG/DL (ref 0.5–1.4)
EAG (OHS): 171 MG/DL (ref 68–131)
GFR SERPLBLD CREATININE-BSD FMLA CKD-EPI: >60 ML/MIN/1.73/M2
GLUCOSE SERPL-MCNC: 166 MG/DL (ref 70–110)
HBA1C MFR BLD: 7.6 % (ref 4–5.6)
HDLC SERPL-MCNC: 50 MG/DL (ref 40–75)
HDLC SERPL: 32.5 % (ref 20–50)
LDLC SERPL CALC-MCNC: 79.2 MG/DL (ref 63–159)
NONHDLC SERPL-MCNC: 104 MG/DL
POTASSIUM SERPL-SCNC: 3.6 MMOL/L (ref 3.5–5.1)
PROT SERPL-MCNC: 7 GM/DL (ref 6–8.4)
SODIUM SERPL-SCNC: 143 MMOL/L (ref 136–145)
TRIGL SERPL-MCNC: 124 MG/DL (ref 30–150)

## 2025-08-08 PROCEDURE — 36415 COLL VENOUS BLD VENIPUNCTURE: CPT | Mod: PO

## 2025-08-08 PROCEDURE — 80061 LIPID PANEL: CPT

## 2025-08-08 PROCEDURE — 83036 HEMOGLOBIN GLYCOSYLATED A1C: CPT

## 2025-08-08 PROCEDURE — 80053 COMPREHEN METABOLIC PANEL: CPT

## 2025-08-14 ENCOUNTER — CLINICAL SUPPORT (OUTPATIENT)
Dept: AUDIOLOGY | Facility: CLINIC | Age: 80
End: 2025-08-14
Payer: MEDICARE

## 2025-08-14 DIAGNOSIS — H90.3 SENSORINEURAL HEARING LOSS, BILATERAL: Primary | ICD-10-CM

## 2025-08-28 ENCOUNTER — CLINICAL SUPPORT (OUTPATIENT)
Dept: AUDIOLOGY | Facility: CLINIC | Age: 80
End: 2025-08-28
Payer: MEDICARE

## 2025-08-28 DIAGNOSIS — H90.3 SENSORINEURAL HEARING LOSS, BILATERAL: Primary | ICD-10-CM

## 2025-08-28 PROCEDURE — 99499 UNLISTED E&M SERVICE: CPT | Mod: S$GLB,,, | Performed by: AUDIOLOGIST

## 2025-08-29 ENCOUNTER — TELEPHONE (OUTPATIENT)
Dept: FAMILY MEDICINE | Facility: CLINIC | Age: 80
End: 2025-08-29
Payer: MEDICARE

## 2025-09-03 ENCOUNTER — OFFICE VISIT (OUTPATIENT)
Dept: FAMILY MEDICINE | Facility: CLINIC | Age: 80
End: 2025-09-03
Payer: MEDICARE

## 2025-09-03 VITALS
BODY MASS INDEX: 27.38 KG/M2 | OXYGEN SATURATION: 96 % | WEIGHT: 149.69 LBS | SYSTOLIC BLOOD PRESSURE: 132 MMHG | DIASTOLIC BLOOD PRESSURE: 72 MMHG | HEART RATE: 69 BPM

## 2025-09-03 DIAGNOSIS — F41.9 ANXIETY: ICD-10-CM

## 2025-09-03 DIAGNOSIS — M85.80 OSTEOPENIA, UNSPECIFIED LOCATION: ICD-10-CM

## 2025-09-03 DIAGNOSIS — Z00.00 PREVENTATIVE HEALTH CARE: Primary | ICD-10-CM

## 2025-09-03 DIAGNOSIS — E11.9 DIET-CONTROLLED DIABETES MELLITUS: ICD-10-CM

## 2025-09-03 DIAGNOSIS — E11.9 CONTROLLED TYPE 2 DIABETES MELLITUS WITHOUT COMPLICATION, WITHOUT LONG-TERM CURRENT USE OF INSULIN: ICD-10-CM

## 2025-09-03 DIAGNOSIS — L30.4 INTERTRIGO: ICD-10-CM

## 2025-09-03 DIAGNOSIS — N76.0 ACUTE VAGINITIS: ICD-10-CM

## 2025-09-03 DIAGNOSIS — Z23 NEED FOR VACCINATION: ICD-10-CM

## 2025-09-03 DIAGNOSIS — I83.90 VARICOSE VEINS OF ANKLE: ICD-10-CM

## 2025-09-03 DIAGNOSIS — E78.5 DYSLIPIDEMIA ASSOCIATED WITH TYPE 2 DIABETES MELLITUS: ICD-10-CM

## 2025-09-03 DIAGNOSIS — Z12.11 SCREEN FOR COLON CANCER: ICD-10-CM

## 2025-09-03 DIAGNOSIS — Z78.0 ASYMPTOMATIC MENOPAUSAL STATE: ICD-10-CM

## 2025-09-03 DIAGNOSIS — E11.69 DYSLIPIDEMIA ASSOCIATED WITH TYPE 2 DIABETES MELLITUS: ICD-10-CM

## 2025-09-03 DIAGNOSIS — J45.30 MILD PERSISTENT ASTHMA WITHOUT COMPLICATION: ICD-10-CM

## 2025-09-03 PROCEDURE — 99999 PR PBB SHADOW E&M-EST. PATIENT-LVL IV: CPT | Mod: PBBFAC,,, | Performed by: INTERNAL MEDICINE

## 2025-09-03 RX ORDER — CLOTRIMAZOLE AND BETAMETHASONE DIPROPIONATE 10; .64 MG/G; MG/G
CREAM TOPICAL 2 TIMES DAILY
Qty: 45 G | Refills: 0 | Status: SHIPPED | OUTPATIENT
Start: 2025-09-03

## 2025-09-03 RX ORDER — LANCETS
1 EACH MISCELLANEOUS DAILY
Qty: 100 EACH | Refills: 11 | Status: SHIPPED | OUTPATIENT
Start: 2025-09-03

## 2025-09-03 RX ORDER — ALBUTEROL SULFATE 90 UG/1
2 INHALANT RESPIRATORY (INHALATION) EVERY 6 HOURS PRN
Qty: 18 G | Refills: 3 | Status: SHIPPED | OUTPATIENT
Start: 2025-09-03

## 2025-09-03 RX ORDER — LORAZEPAM 1 MG/1
1 TABLET ORAL NIGHTLY PRN
Qty: 30 TABLET | Refills: 3 | Status: SHIPPED | OUTPATIENT
Start: 2025-09-03

## (undated) DEVICE — SUT VICRYL 3-0 27 SH

## (undated) DEVICE — TOWEL OR DISP STRL BLUE 4/PK

## (undated) DEVICE — SUT 2/0 30IN SILK BLK BRAI

## (undated) DEVICE — DRAPE STERI INSTRUMENT 1018

## (undated) DEVICE — Device

## (undated) DEVICE — SOL IRRI STRL WATER 1000ML

## (undated) DEVICE — NDL SAFETY 22G X 1.5 ECLIPSE

## (undated) DEVICE — GOWN NONREINF SET-IN SLV XL

## (undated) DEVICE — ADHESIVE DERMABOND ADVANCED

## (undated) DEVICE — EVACUATOR WOUND BULB 100CC

## (undated) DEVICE — CONTAINER SPEC OR STRL 4.5OZ

## (undated) DEVICE — DRAIN CHANNEL ROUND 19FR

## (undated) DEVICE — DRESSING TRANS 4X10 TEGADERM

## (undated) DEVICE — SKINMARKER W/RULER DEVON

## (undated) DEVICE — UNDERGLOVES BIOGEL PI SZ 7 LF

## (undated) DEVICE — SYR 10CC LUER LOCK

## (undated) DEVICE — GLOVE BIOGEL SKINSENSE PI 6.5

## (undated) DEVICE — BRA CLASSIC COMFORT 40 BLACK

## (undated) DEVICE — ELECTRODE REM PLYHSV RETURN 9

## (undated) DEVICE — APPLIER CLIP LIAGCLIP 9.375IN

## (undated) DEVICE — SUT VICRYL PLUS 4-0 PS2 27

## (undated) DEVICE — DRAPE THREE-QTR REINF 53X77IN

## (undated) DEVICE — POSITIONER IV ARMBOARD FOAM

## (undated) DEVICE — SPONGE LAP 18X18 PREWASHED

## (undated) DEVICE — ELECTRODE BLADE INSULATED 1 IN

## (undated) DEVICE — APPLICATOR CHLORAPREP ORN 26ML

## (undated) DEVICE — SPONGE BULKEE II ABSRB 6X6.75